# Patient Record
Sex: MALE | Race: WHITE | NOT HISPANIC OR LATINO | Employment: FULL TIME | URBAN - NONMETROPOLITAN AREA
[De-identification: names, ages, dates, MRNs, and addresses within clinical notes are randomized per-mention and may not be internally consistent; named-entity substitution may affect disease eponyms.]

---

## 2017-05-04 DIAGNOSIS — J30.1 ALLERGIC RHINITIS DUE TO POLLEN: ICD-10-CM

## 2017-05-06 RX ORDER — LOSARTAN POTASSIUM AND HYDROCHLOROTHIAZIDE 25; 100 MG/1; MG/1
TABLET ORAL
Qty: 30 TABLET | Refills: 4 | Status: SHIPPED | OUTPATIENT
Start: 2017-05-06 | End: 2018-02-16 | Stop reason: SDUPTHER

## 2017-05-06 RX ORDER — FLUTICASONE PROPIONATE 50 MCG
SPRAY, SUSPENSION (ML) NASAL
Qty: 2 EACH | Refills: 4 | Status: SHIPPED | OUTPATIENT
Start: 2017-05-06 | End: 2018-02-16 | Stop reason: SDUPTHER

## 2017-05-06 RX ORDER — POTASSIUM CHLORIDE 750 MG/1
TABLET, FILM COATED, EXTENDED RELEASE ORAL
Qty: 30 TABLET | Refills: 3 | Status: SHIPPED | OUTPATIENT
Start: 2017-05-06 | End: 2017-11-30 | Stop reason: SDUPTHER

## 2017-05-11 RX ORDER — ATORVASTATIN CALCIUM 20 MG/1
TABLET, FILM COATED ORAL
Qty: 30 TABLET | Refills: 3 | Status: SHIPPED | OUTPATIENT
Start: 2017-05-11 | End: 2018-02-16 | Stop reason: SDUPTHER

## 2017-12-01 RX ORDER — POTASSIUM CHLORIDE 750 MG/1
TABLET, FILM COATED, EXTENDED RELEASE ORAL
Qty: 30 TABLET | Refills: 3 | Status: SHIPPED | OUTPATIENT
Start: 2017-12-01 | End: 2018-02-16 | Stop reason: SDUPTHER

## 2018-02-16 DIAGNOSIS — J30.1 ALLERGIC RHINITIS DUE TO POLLEN: ICD-10-CM

## 2018-02-17 RX ORDER — LOSARTAN POTASSIUM AND HYDROCHLOROTHIAZIDE 25; 100 MG/1; MG/1
TABLET ORAL
Qty: 30 TABLET | Refills: 5 | Status: SHIPPED | OUTPATIENT
Start: 2018-02-17 | End: 2018-06-22 | Stop reason: SDUPTHER

## 2018-02-17 RX ORDER — POTASSIUM CHLORIDE 750 MG/1
TABLET, FILM COATED, EXTENDED RELEASE ORAL
Qty: 30 TABLET | Refills: 4 | Status: SHIPPED | OUTPATIENT
Start: 2018-02-17 | End: 2018-06-22 | Stop reason: SDUPTHER

## 2018-02-17 RX ORDER — FLUTICASONE PROPIONATE 50 MCG
SPRAY, SUSPENSION (ML) NASAL
Qty: 16 G | Refills: 4 | Status: SHIPPED | OUTPATIENT
Start: 2018-02-17 | End: 2018-09-06 | Stop reason: SDUPTHER

## 2018-02-17 RX ORDER — ATORVASTATIN CALCIUM 20 MG/1
TABLET, FILM COATED ORAL
Qty: 30 TABLET | Refills: 4 | Status: SHIPPED | OUTPATIENT
Start: 2018-02-17 | End: 2018-06-22 | Stop reason: SDUPTHER

## 2018-04-19 ENCOUNTER — OFFICE VISIT (OUTPATIENT)
Dept: FAMILY MEDICINE CLINIC | Facility: CLINIC | Age: 48
End: 2018-04-19

## 2018-04-19 VITALS
WEIGHT: 266 LBS | OXYGEN SATURATION: 98 % | HEIGHT: 73 IN | HEART RATE: 78 BPM | DIASTOLIC BLOOD PRESSURE: 80 MMHG | BODY MASS INDEX: 35.25 KG/M2 | TEMPERATURE: 98.1 F | SYSTOLIC BLOOD PRESSURE: 130 MMHG

## 2018-04-19 DIAGNOSIS — R73.9 HYPERGLYCEMIA: ICD-10-CM

## 2018-04-19 DIAGNOSIS — R73.03 PREDIABETES: ICD-10-CM

## 2018-04-19 DIAGNOSIS — E78.2 MIXED HYPERLIPIDEMIA: ICD-10-CM

## 2018-04-19 DIAGNOSIS — J40 BRONCHITIS: Primary | ICD-10-CM

## 2018-04-19 DIAGNOSIS — I10 ESSENTIAL HYPERTENSION: ICD-10-CM

## 2018-04-19 LAB
ALBUMIN SERPL-MCNC: 4.3 G/DL (ref 3.5–5)
ALBUMIN/GLOB SERPL: 1.4 G/DL (ref 1.5–2.5)
ALP SERPL-CCNC: 56 U/L (ref 40–129)
ALT SERPL W P-5'-P-CCNC: 25 U/L (ref 10–44)
ANION GAP SERPL CALCULATED.3IONS-SCNC: 7.8 MMOL/L (ref 3.6–11.2)
AST SERPL-CCNC: 20 U/L (ref 10–34)
BILIRUB SERPL-MCNC: 0.8 MG/DL (ref 0.2–1.8)
BUN BLD-MCNC: 18 MG/DL (ref 7–21)
BUN/CREAT SERPL: 18.6 (ref 7–25)
CALCIUM SPEC-SCNC: 9.7 MG/DL (ref 7.7–10)
CHLORIDE SERPL-SCNC: 102 MMOL/L (ref 99–112)
CHOLEST SERPL-MCNC: 138 MG/DL (ref 0–200)
CO2 SERPL-SCNC: 30.2 MMOL/L (ref 24.3–31.9)
CREAT BLD-MCNC: 0.97 MG/DL (ref 0.43–1.29)
GFR SERPL CREATININE-BSD FRML MDRD: 83 ML/MIN/1.73
GLOBULIN UR ELPH-MCNC: 3.1 GM/DL
GLUCOSE BLD-MCNC: 117 MG/DL (ref 70–110)
HBA1C MFR BLD: 5.9 % (ref 4.5–5.7)
HDLC SERPL-MCNC: 30 MG/DL (ref 60–100)
LDLC SERPL CALC-MCNC: 89 MG/DL (ref 0–100)
LDLC/HDLC SERPL: 2.97 {RATIO}
OSMOLALITY SERPL CALC.SUM OF ELEC: 282.3 MOSM/KG (ref 273–305)
POTASSIUM BLD-SCNC: 3.6 MMOL/L (ref 3.5–5.3)
PROT SERPL-MCNC: 7.4 G/DL (ref 6–8)
SODIUM BLD-SCNC: 140 MMOL/L (ref 135–153)
TESTOST SERPL-MCNC: 378.09 NG/DL (ref 123.06–813.86)
TRIGL SERPL-MCNC: 95 MG/DL (ref 0–150)
TSH SERPL DL<=0.05 MIU/L-ACNC: 1.57 MIU/ML (ref 0.55–4.78)
VLDLC SERPL-MCNC: 19 MG/DL

## 2018-04-19 PROCEDURE — 80053 COMPREHEN METABOLIC PANEL: CPT | Performed by: PHYSICIAN ASSISTANT

## 2018-04-19 PROCEDURE — 99214 OFFICE O/P EST MOD 30 MIN: CPT | Performed by: PHYSICIAN ASSISTANT

## 2018-04-19 PROCEDURE — 80061 LIPID PANEL: CPT | Performed by: PHYSICIAN ASSISTANT

## 2018-04-19 PROCEDURE — 36415 COLL VENOUS BLD VENIPUNCTURE: CPT | Performed by: PHYSICIAN ASSISTANT

## 2018-04-19 PROCEDURE — 84403 ASSAY OF TOTAL TESTOSTERONE: CPT | Performed by: PHYSICIAN ASSISTANT

## 2018-04-19 PROCEDURE — 84402 ASSAY OF FREE TESTOSTERONE: CPT | Performed by: PHYSICIAN ASSISTANT

## 2018-04-19 PROCEDURE — 84443 ASSAY THYROID STIM HORMONE: CPT | Performed by: PHYSICIAN ASSISTANT

## 2018-04-19 PROCEDURE — 83036 HEMOGLOBIN GLYCOSYLATED A1C: CPT | Performed by: PHYSICIAN ASSISTANT

## 2018-04-19 RX ORDER — LEVOFLOXACIN 500 MG/1
500 TABLET, FILM COATED ORAL DAILY
Qty: 10 TABLET | Refills: 0 | Status: SHIPPED | OUTPATIENT
Start: 2018-04-19 | End: 2018-04-29

## 2018-04-19 NOTE — PROGRESS NOTES
Subjective   Anne Nunez is a 47 y.o. male.     Chief complaint-chest congestion and cough    History of Present Illness     Chest congestion-  He complains of chest congestion, productive cough of thick green sputum, and sore throat for the past 4 days.  Some relief with Flonase and Zyrtec.    Dyslipidemia  Compliance with treatment has been good. The patient exercises frequently. He is currently being prescribed the following medication for his dyslipidemia - lifestyle modifcation, atorvastatin, omega 3 fatty acids. Patient denies side effects associated with his medications. Most recent lipids include  Lab Results   Component Value Date    TRIG 105 10/07/2014    HDL 33 (L) 10/07/2014     (H) 10/07/2014   Not at goal    Hypertension-well controlled with losartan /25 mg daily    Prediabetes-stable with diet.  most recent hemoglobin A1c 5.4  Results for ANNE NUNEZ (MRN [id number]) as of 4/19/2018 10:52   Ref. Range 10/7/2014 15:01   Hemoglobin A1C Latest Ref Range: 4.5 - 5.7 % 5.4       The following portions of the patient's history were reviewed and updated as appropriate: allergies, current medications, past family history, past medical history, past social history, past surgical history and problem list.    Review of Systems   Constitutional: Negative for activity change, appetite change and fever.   HENT: Positive for congestion and sore throat. Negative for ear pain and sinus pressure.    Eyes: Negative for pain and visual disturbance.   Respiratory: Positive for cough. Negative for chest tightness.    Cardiovascular: Negative for chest pain and palpitations.   Gastrointestinal: Negative for abdominal pain, constipation, diarrhea, nausea and vomiting.   Endocrine: Negative for polydipsia and polyuria.   Genitourinary: Negative for dysuria and frequency.   Musculoskeletal: Negative for back pain and myalgias.   Skin: Negative for color change and rash.   Allergic/Immunologic: Negative for food  "allergies and immunocompromised state.   Neurological: Negative for dizziness, syncope and headaches.   Hematological: Negative for adenopathy. Does not bruise/bleed easily.   Psychiatric/Behavioral: Negative for hallucinations and suicidal ideas. The patient is not nervous/anxious.        /80   Pulse 78   Temp 98.1 °F (36.7 °C) (Temporal Artery )   Ht 185.4 cm (73\")   Wt 121 kg (266 lb)   SpO2 98%   BMI 35.09 kg/m²     Physical Exam   Constitutional: He is oriented to person, place, and time. He appears well-developed and well-nourished.   HENT:   Head: Normocephalic and atraumatic.   Nose: Nose normal.   Oropharynx erythematous.   Eyes: Conjunctivae and EOM are normal. Pupils are equal, round, and reactive to light.   Neck: Normal range of motion. Neck supple. No tracheal deviation present. No thyromegaly present.   Cardiovascular: Normal rate, regular rhythm, normal heart sounds and intact distal pulses.    No murmur heard.  Pulmonary/Chest: Effort normal. No respiratory distress. He has no wheezes.   Bronchovesicular breath sounds noted bilaterally   Abdominal: Soft. Bowel sounds are normal. There is no tenderness. There is no guarding.   Musculoskeletal: Normal range of motion. He exhibits no edema or tenderness.   Lymphadenopathy:     He has no cervical adenopathy.   Neurological: He is alert and oriented to person, place, and time.   Skin: Skin is warm and dry. No rash noted.   Psychiatric: He has a normal mood and affect. His behavior is normal.   Nursing note and vitals reviewed.      Assessment/Plan     Diagnoses and all orders for this visit:    Bronchitis  -     levoFLOXacin (LEVAQUIN) 500 MG tablet; Take 1 tablet by mouth Daily for 10 days.    Mixed hyperlipidemia  Comments:  He was advised follow-up cholesterol diet  Orders:  -     Comprehensive Metabolic Panel  -     Lipid Panel    Essential hypertension  -     TSH  -     Testosterone  -     Testosterone Free " Direct    Prediabetes    Hyperglycemia  -     Hemoglobin A1c                 This document has been electronically signed by:  Arianna Kim PA-C

## 2018-04-22 LAB — TESTOST FREE SERPL-MCNC: 7.1 PG/ML (ref 6.8–21.5)

## 2018-05-11 RX ORDER — FLUOXETINE HYDROCHLORIDE 20 MG/1
CAPSULE ORAL
Qty: 30 CAPSULE | Refills: 4 | Status: SHIPPED | OUTPATIENT
Start: 2018-05-11 | End: 2018-06-22 | Stop reason: SDUPTHER

## 2018-06-18 ENCOUNTER — OFFICE VISIT (OUTPATIENT)
Dept: FAMILY MEDICINE CLINIC | Facility: CLINIC | Age: 48
End: 2018-06-18

## 2018-06-18 VITALS
HEIGHT: 73 IN | RESPIRATION RATE: 12 BRPM | SYSTOLIC BLOOD PRESSURE: 125 MMHG | OXYGEN SATURATION: 96 % | HEART RATE: 81 BPM | TEMPERATURE: 99.2 F | WEIGHT: 268 LBS | BODY MASS INDEX: 35.52 KG/M2 | DIASTOLIC BLOOD PRESSURE: 75 MMHG
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
## 2018-06-18 PROCEDURE — 99213 OFFICE O/P EST LOW 20 MIN: CPT | Performed by: GENERAL PRACTICE

## 2018-06-19 NOTE — PROGRESS NOTES
Subjective   Kwadwo Franks is a 48 y.o. male.     History of Present Illness     Skin Lesion  Presents with a four day history of an itchy red spot over his left lower back.  He first noticed this when he came in from mowing the grass.  He denies any spots elsewhere nor any other associated symptoms.  He has otherwise felt well and has had no fever or chills.  His wife applied hydrocortisone 1% cream yesterday and gave him 10 mg of cetirizine and he feels these helped some    The following portions of the patient's history were reviewed and updated as appropriate: allergies, current medications and problem list.    Review of Systems   Constitutional: Negative for appetite change, chills and fever.   HENT: Negative for congestion, rhinorrhea and sore throat.    Eyes: Negative for visual disturbance.   Respiratory: Negative for cough and shortness of breath.    Cardiovascular: Negative for chest pain.   Gastrointestinal: Negative for abdominal pain, constipation, diarrhea, nausea and vomiting.   Musculoskeletal: Negative for arthralgias, back pain and myalgias.   Skin: Positive for color change.   Neurological: Negative for headaches.     Objective   Physical Exam   Constitutional: He is oriented to person, place, and time. He appears well-developed and well-nourished. He is cooperative. He does not have a sickly appearance. No distress.   Bright and in good spirits. No apparent distress. No pallor, jaundice, diaphoresis, or cyanosis.   HENT:   Head: Atraumatic.   Right Ear: Tympanic membrane, external ear and ear canal normal.   Left Ear: Tympanic membrane, external ear and ear canal normal.   Mouth/Throat: Oropharynx is clear and moist.   Eyes: EOM are normal. Pupils are equal, round, and reactive to light. No scleral icterus.   Neck: No JVD present. Carotid bruit is not present. No tracheal deviation present. No thyromegaly present.   Cardiovascular: Normal rate, regular rhythm, S1 normal, S2 normal, normal heart  sounds and intact distal pulses.  Exam reveals no gallop.    No murmur heard.  Pulmonary/Chest: Breath sounds normal. He has no wheezes. He has no rales.   Musculoskeletal: He exhibits no tenderness or deformity.   Lymphadenopathy:        Head (right side): No submandibular adenopathy present.        Head (left side): No submandibular adenopathy present.     He has no cervical adenopathy.   Neurological: He is alert and oriented to person, place, and time. No cranial nerve deficit. Coordination normal.   Skin: Skin is warm and dry. Lesion (10 cm well demarcated narrow oval blanchable uniformly erythematous patch left lower back - slightly warm - no tenderness) noted. No rash noted. He is not diaphoretic. No pallor. Nails show no clubbing.   Psychiatric: He has a normal mood and affect. His behavior is normal.     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

## 2018-06-21 ENCOUNTER — OFFICE VISIT (OUTPATIENT)
Dept: FAMILY MEDICINE CLINIC | Facility: CLINIC | Age: 48
End: 2018-06-21

## 2018-06-21 VITALS
SYSTOLIC BLOOD PRESSURE: 130 MMHG | OXYGEN SATURATION: 98 % | HEIGHT: 73 IN | HEART RATE: 79 BPM | BODY MASS INDEX: 35.25 KG/M2 | WEIGHT: 266 LBS | DIASTOLIC BLOOD PRESSURE: 76 MMHG | TEMPERATURE: 97.7 F

## 2018-06-21 DIAGNOSIS — R73.03 PREDIABETES: ICD-10-CM

## 2018-06-21 DIAGNOSIS — I10 ESSENTIAL HYPERTENSION: Primary | ICD-10-CM

## 2018-06-21 DIAGNOSIS — E78.2 MIXED HYPERLIPIDEMIA: ICD-10-CM

## 2018-06-21 PROCEDURE — 99214 OFFICE O/P EST MOD 30 MIN: CPT | Performed by: PHYSICIAN ASSISTANT

## 2018-06-22 RX ORDER — LOSARTAN POTASSIUM AND HYDROCHLOROTHIAZIDE 25; 100 MG/1; MG/1
1 TABLET ORAL EVERY MORNING
Qty: 30 TABLET | Refills: 5 | Status: SHIPPED | OUTPATIENT
Start: 2018-06-22 | End: 2018-10-08

## 2018-06-22 RX ORDER — FLUOXETINE HYDROCHLORIDE 20 MG/1
20 CAPSULE ORAL DAILY
Qty: 30 CAPSULE | Refills: 5 | Status: SHIPPED | OUTPATIENT
Start: 2018-06-22 | End: 2018-10-08

## 2018-06-22 RX ORDER — POTASSIUM CHLORIDE 750 MG/1
10 TABLET, FILM COATED, EXTENDED RELEASE ORAL DAILY
Qty: 30 TABLET | Refills: 5 | Status: SHIPPED | OUTPATIENT
Start: 2018-06-22 | End: 2018-12-21 | Stop reason: SDUPTHER

## 2018-06-22 RX ORDER — ATORVASTATIN CALCIUM 20 MG/1
20 TABLET, FILM COATED ORAL DAILY
Qty: 30 TABLET | Refills: 5 | Status: SHIPPED | OUTPATIENT
Start: 2018-06-22 | End: 2018-08-20

## 2018-06-22 NOTE — PROGRESS NOTES
"Subjective   Kwadwo Franks is a 48 y.o. male.     Chief complaint-hypertension    History of Present Illness     Hypertension-  He is here today to follow-up on chronic issues including hypertension that is well controlled with losartan HCT.    Hyperlipidemia-stable with Lipitor    Prediabetes-currently controlled with diet.  Last hemoglobin A1c was 5.9.    The following portions of the patient's history were reviewed and updated as appropriate: allergies, current medications, past family history, past medical history, past social history, past surgical history and problem list.    Review of Systems   Constitutional: Negative for activity change, appetite change and fever.   HENT: Negative for ear pain, sinus pressure and sore throat.    Eyes: Negative for pain and visual disturbance.   Respiratory: Negative for cough and chest tightness.    Cardiovascular: Negative for chest pain and palpitations.   Gastrointestinal: Negative for abdominal pain, constipation, diarrhea, nausea and vomiting.   Endocrine: Negative for polydipsia and polyuria.   Genitourinary: Negative for dysuria and frequency.   Musculoskeletal: Negative for back pain and myalgias.   Skin: Negative for color change and rash.   Allergic/Immunologic: Negative for food allergies and immunocompromised state.   Neurological: Negative for dizziness, syncope and headaches.   Hematological: Negative for adenopathy. Does not bruise/bleed easily.   Psychiatric/Behavioral: Negative for hallucinations and suicidal ideas. The patient is not nervous/anxious.        /76   Pulse 79   Temp 97.7 °F (36.5 °C) (Oral)   Ht 185.4 cm (72.99\")   Wt 121 kg (266 lb)   SpO2 98%   BMI 35.10 kg/m²   Office Visit on 04/19/2018   Component Date Value Ref Range Status   • Glucose 04/19/2018 117* 70 - 110 mg/dL Final   • BUN 04/19/2018 18  7 - 21 mg/dL Final   • Creatinine 04/19/2018 0.97  0.43 - 1.29 mg/dL Final   • Sodium 04/19/2018 140  135 - 153 mmol/L Final   • " Potassium 04/19/2018 3.6  3.5 - 5.3 mmol/L Final   • Chloride 04/19/2018 102  99 - 112 mmol/L Final   • CO2 04/19/2018 30.2  24.3 - 31.9 mmol/L Final   • Calcium 04/19/2018 9.7  7.7 - 10.0 mg/dL Final   • Total Protein 04/19/2018 7.4  6.0 - 8.0 g/dL Final   • Albumin 04/19/2018 4.30  3.50 - 5.00 g/dL Final   • ALT (SGPT) 04/19/2018 25  10 - 44 U/L Final   • AST (SGOT) 04/19/2018 20  10 - 34 U/L Final   • Alkaline Phosphatase 04/19/2018 56  40 - 129 U/L Final   • Total Bilirubin 04/19/2018 0.8  0.2 - 1.8 mg/dL Final   • eGFR Non African Amer 04/19/2018 83  >60 mL/min/1.73 Final   • Globulin 04/19/2018 3.1  gm/dL Final   • A/G Ratio 04/19/2018 1.4* 1.5 - 2.5 g/dL Final   • BUN/Creatinine Ratio 04/19/2018 18.6  7.0 - 25.0 Final   • Anion Gap 04/19/2018 7.8  3.6 - 11.2 mmol/L Final   • Total Cholesterol 04/19/2018 138  0 - 200 mg/dL Final   • Triglycerides 04/19/2018 95  0 - 150 mg/dL Final   • HDL Cholesterol 04/19/2018 30* 60 - 100 mg/dL Final   • LDL Cholesterol  04/19/2018 89  0 - 100 mg/dL Final   • VLDL Cholesterol 04/19/2018 19  mg/dL Final   • LDL/HDL Ratio 04/19/2018 2.97   Final   • Hemoglobin A1C 04/19/2018 5.90* 4.50 - 5.70 % Final   • TSH 04/19/2018 1.570  0.550 - 4.780 mIU/mL Final   • Testosterone, Total 04/19/2018 378.09  123.06 - 813.86 ng/dL Final   • Testosterone, Free 04/19/2018 7.1  6.8 - 21.5 pg/mL Final   • Osmolality Calc 04/19/2018 282.3  273.0 - 305.0 mOsm/kg Final       Physical Exam   Constitutional: He is oriented to person, place, and time. He appears well-developed and well-nourished.   HENT:   Head: Normocephalic and atraumatic.   Nose: Nose normal.   Mouth/Throat: Oropharynx is clear and moist.   Eyes: Conjunctivae and EOM are normal. Pupils are equal, round, and reactive to light.   Neck: Normal range of motion. Neck supple. No tracheal deviation present. No thyromegaly present.   Cardiovascular: Normal rate, regular rhythm, normal heart sounds and intact distal pulses.    No murmur  heard.  Pulmonary/Chest: Effort normal and breath sounds normal. No respiratory distress. He has no wheezes.   Abdominal: Soft. Bowel sounds are normal. There is no tenderness. There is no guarding.   Musculoskeletal: Normal range of motion. He exhibits no edema or tenderness.   Lymphadenopathy:     He has no cervical adenopathy.   Neurological: He is alert and oriented to person, place, and time.   Skin: Skin is warm and dry. No rash noted.   Psychiatric: He has a normal mood and affect. His behavior is normal.   Nursing note and vitals reviewed.      Assessment/Plan     Diagnoses and all orders for this visit:    Essential hypertension  -     losartan-hydrochlorothiazide (HYZAAR) 100-25 MG per tablet; Take 1 tablet by mouth Every Morning.    Mixed hyperlipidemia  -     atorvastatin (LIPITOR) 20 MG tablet; Take 1 tablet by mouth Daily.    Prediabetes    Other orders  -     FLUoxetine (PROzac) 20 MG capsule; Take 1 capsule by mouth Daily.  -     potassium chloride (K-DUR) 10 MEQ CR tablet; Take 1 tablet by mouth Daily.                 This document has been electronically signed by:  Arianna Kim PA-C

## 2018-08-20 ENCOUNTER — OFFICE VISIT (OUTPATIENT)
Dept: FAMILY MEDICINE CLINIC | Facility: CLINIC | Age: 48
End: 2018-08-20

## 2018-08-20 VITALS
HEIGHT: 73 IN | WEIGHT: 264 LBS | BODY MASS INDEX: 34.99 KG/M2 | SYSTOLIC BLOOD PRESSURE: 130 MMHG | HEART RATE: 111 BPM | OXYGEN SATURATION: 95 % | DIASTOLIC BLOOD PRESSURE: 80 MMHG | TEMPERATURE: 98.9 F

## 2018-08-20 DIAGNOSIS — S03.00XA DISLOCATION OF TEMPOROMANDIBULAR JOINT, INITIAL ENCOUNTER: Primary | ICD-10-CM

## 2018-08-20 DIAGNOSIS — R00.0 TACHYCARDIA: ICD-10-CM

## 2018-08-20 PROCEDURE — 99213 OFFICE O/P EST LOW 20 MIN: CPT | Performed by: PHYSICIAN ASSISTANT

## 2018-08-20 PROCEDURE — 93000 ELECTROCARDIOGRAM COMPLETE: CPT | Performed by: PHYSICIAN ASSISTANT

## 2018-08-20 NOTE — PROGRESS NOTES
"Subjective   Kwadwo Franks is a 48 y.o. male.     Chief complaint-jaw tingling    History of Present Illness     Jaw tingling-  He complains of bilateral lower jaw tingling and numbness that extends down to bilateral upper clavicular area.  He reports a feeling of mild to moderate pain stating \"it feels like I'm wearing a TENS unit.\"  He states symptoms radiated to posterior neck earlier today but those have resolved.  Onset 2 hours ago.  He states he has had nausea for about 3 days.  He denies any weakness, decreased strength, dysphagia, or dysphasia.    The following portions of the patient's history were reviewed and updated as appropriate: allergies, current medications, past family history, past medical history, past social history, past surgical history and problem list.    Review of Systems   Constitutional: Negative for activity change, appetite change and fever.   HENT: Negative for ear pain, sinus pressure and sore throat.    Eyes: Negative for pain and visual disturbance.   Respiratory: Negative for cough and chest tightness.    Cardiovascular: Negative for chest pain and palpitations.   Gastrointestinal: Negative for abdominal pain, constipation, diarrhea, nausea and vomiting.   Endocrine: Negative for polydipsia and polyuria.   Genitourinary: Negative for dysuria and frequency.   Musculoskeletal: Negative for back pain and myalgias.   Skin: Negative for color change and rash.   Allergic/Immunologic: Negative for food allergies and immunocompromised state.   Neurological: Positive for numbness. Negative for dizziness, syncope and headaches.   Hematological: Negative for adenopathy. Does not bruise/bleed easily.   Psychiatric/Behavioral: Negative for hallucinations and suicidal ideas. The patient is not nervous/anxious.        /80   Pulse 111   Temp 98.9 °F (37.2 °C)   Ht 185.4 cm (72.99\")   Wt 120 kg (264 lb)   SpO2 95%   BMI 34.84 kg/m²     Physical Exam   Constitutional: He is oriented to " person, place, and time. He appears well-developed and well-nourished.   HENT:   Head: Normocephalic and atraumatic.   Nose: Nose normal.   Mouth/Throat: Oropharynx is clear and moist.   Eyes: Pupils are equal, round, and reactive to light. Conjunctivae and EOM are normal.   Neck: Normal range of motion. Neck supple. No tracheal deviation present. No thyromegaly present.   Cardiovascular: Normal rate, regular rhythm, normal heart sounds and intact distal pulses.    No murmur heard.  Pulmonary/Chest: Effort normal and breath sounds normal. No respiratory distress. He has no wheezes.   Abdominal: Soft. Bowel sounds are normal. There is no tenderness. There is no guarding.   Musculoskeletal: Normal range of motion. He exhibits no edema or tenderness.   Lymphadenopathy:     He has no cervical adenopathy.   Neurological: He is alert and oriented to person, place, and time. No cranial nerve deficit or sensory deficit. He exhibits normal muscle tone. Coordination normal.   Skin: Skin is warm and dry. No rash noted.   Psychiatric: He has a normal mood and affect. His behavior is normal.   Nursing note and vitals reviewed.      Assessment/Plan     Diagnoses and all orders for this visit:    Dislocation of temporomandibular joint, initial encounter    Tachycardia  Comments:  Tachycardia likely due to anxiety about acute issue  Orders:  -     ECG 12 Lead    He was advised on conservative treatment including avoiding foods that require a lot of mastication including steak or chewing bubblegum.  He was advised to get a bite guard to wear at night.  He was advised that if symptoms should persist or worsen that he should come back for immediate evaluation and treatment regarding the nearest emergency room.    8/20/2018 12:34 PM  EKG  Performed by: Lisa Fournier CMA  Interpreted by: Arianna Kim PA-C  Rhythm: Sinus rhythm  Rate: Normal at 91 bpm  QRS axis: Normal  ST segments: Normal  T waves: Normal  Conduction: Normal  Clinical  interpretation: Normal EKG             This document has been electronically signed by:  Arianna Kim PA-C

## 2018-09-06 DIAGNOSIS — J30.1 ALLERGIC RHINITIS DUE TO POLLEN: ICD-10-CM

## 2018-09-10 RX ORDER — FLUTICASONE PROPIONATE 50 MCG
SPRAY, SUSPENSION (ML) NASAL
Qty: 16 G | Refills: 4 | Status: SHIPPED | OUTPATIENT
Start: 2018-09-10 | End: 2018-12-21 | Stop reason: SDUPTHER

## 2018-09-10 RX ORDER — POTASSIUM CHLORIDE 750 MG/1
TABLET, FILM COATED, EXTENDED RELEASE ORAL
Qty: 30 TABLET | Refills: 4 | Status: SHIPPED | OUTPATIENT
Start: 2018-09-10 | End: 2018-10-08 | Stop reason: SDUPTHER

## 2018-10-08 ENCOUNTER — OFFICE VISIT (OUTPATIENT)
Dept: FAMILY MEDICINE CLINIC | Facility: CLINIC | Age: 48
End: 2018-10-08

## 2018-10-08 VITALS
WEIGHT: 250 LBS | SYSTOLIC BLOOD PRESSURE: 122 MMHG | DIASTOLIC BLOOD PRESSURE: 80 MMHG | TEMPERATURE: 98.4 F | BODY MASS INDEX: 33.13 KG/M2 | OXYGEN SATURATION: 97 % | HEART RATE: 94 BPM | HEIGHT: 73 IN

## 2018-10-08 DIAGNOSIS — F41.1 GAD (GENERALIZED ANXIETY DISORDER): Primary | ICD-10-CM

## 2018-10-08 DIAGNOSIS — E78.2 MIXED HYPERLIPIDEMIA: ICD-10-CM

## 2018-10-08 DIAGNOSIS — H61.21 IMPACTED CERUMEN OF RIGHT EAR: ICD-10-CM

## 2018-10-08 DIAGNOSIS — Z00.00 HEALTHCARE MAINTENANCE: ICD-10-CM

## 2018-10-08 DIAGNOSIS — I10 ESSENTIAL HYPERTENSION: ICD-10-CM

## 2018-10-08 PROCEDURE — 90471 IMMUNIZATION ADMIN: CPT | Performed by: PHYSICIAN ASSISTANT

## 2018-10-08 PROCEDURE — 90686 IIV4 VACC NO PRSV 0.5 ML IM: CPT | Performed by: PHYSICIAN ASSISTANT

## 2018-10-08 PROCEDURE — 99214 OFFICE O/P EST MOD 30 MIN: CPT | Performed by: PHYSICIAN ASSISTANT

## 2018-10-08 PROCEDURE — 69210 REMOVE IMPACTED EAR WAX UNI: CPT | Performed by: PHYSICIAN ASSISTANT

## 2018-10-08 RX ORDER — FLUOXETINE HYDROCHLORIDE 40 MG/1
40 CAPSULE ORAL DAILY
Qty: 30 CAPSULE | Refills: 5 | Status: SHIPPED | OUTPATIENT
Start: 2018-10-08 | End: 2018-12-21 | Stop reason: SDUPTHER

## 2018-10-08 RX ORDER — LOSARTAN POTASSIUM AND HYDROCHLOROTHIAZIDE 25; 100 MG/1; MG/1
TABLET ORAL
Qty: 30 TABLET | Refills: 5 | Status: SHIPPED | OUTPATIENT
Start: 2018-10-08 | End: 2018-12-21 | Stop reason: SDUPTHER

## 2018-10-09 NOTE — PROGRESS NOTES
Subjective   Kwadwo Franks is a 48 y.o. male.     Chief complaint-facial tingling    History of Present Illness     Facial tingling-  He complains of sudden onset of tingling that began at bilateral posterior neck and then radiated to bilateral jaws right side of face to the right mouth nose and I.  He reports associated numbness with a tight feeling on the right side of the face and lateral left eye area.  He states the symptoms began approximately 2-1/2 hours ago and have lessened in severity since that time.  He has been under a lot of stress lately working full-time and running for a public office.  He denies any strokelike symptoms including dysphagia, dysphagia, or unilateral weakness.     Generalized anxiety disorder-not at goal with Prozac 20 mg daily.  He states that he often times has trouble going to sleep because his mind is racing thinking about things to BE done in the future and thinks that he has done that day.    Hypertension-well controlled with losartan/HCTZ    Hyperlipidemia-currently stable with diet    The following portions of the patient's history were reviewed and updated as appropriate: allergies, current medications, past family history, past medical history, past social history, past surgical history and problem list.    Review of Systems   Constitutional: Negative for activity change, appetite change and fever.   HENT: Positive for congestion and ear pain (fullness). Negative for sinus pressure and sore throat.    Eyes: Negative for pain and visual disturbance.   Respiratory: Negative for cough and chest tightness.    Cardiovascular: Negative for chest pain and palpitations.   Gastrointestinal: Negative for abdominal pain, constipation, diarrhea, nausea and vomiting.   Endocrine: Negative for polydipsia and polyuria.   Genitourinary: Negative for dysuria and frequency.   Musculoskeletal: Negative for back pain and myalgias.   Skin: Negative for color change and rash.  "  Allergic/Immunologic: Negative for food allergies and immunocompromised state.   Neurological: Positive for numbness. Negative for dizziness, syncope, weakness and headaches.   Hematological: Negative for adenopathy. Does not bruise/bleed easily.   Psychiatric/Behavioral: Positive for sleep disturbance. Negative for dysphoric mood, hallucinations and suicidal ideas. The patient is nervous/anxious.        /80   Pulse 94   Temp 98.4 °F (36.9 °C) (Oral)   Ht 185.4 cm (72.99\")   Wt 113 kg (250 lb)   SpO2 97%   BMI 32.99 kg/m²   Office Visit on 04/19/2018   Component Date Value Ref Range Status   • Glucose 04/19/2018 117* 70 - 110 mg/dL Final   • BUN 04/19/2018 18  7 - 21 mg/dL Final   • Creatinine 04/19/2018 0.97  0.43 - 1.29 mg/dL Final   • Sodium 04/19/2018 140  135 - 153 mmol/L Final   • Potassium 04/19/2018 3.6  3.5 - 5.3 mmol/L Final   • Chloride 04/19/2018 102  99 - 112 mmol/L Final   • CO2 04/19/2018 30.2  24.3 - 31.9 mmol/L Final   • Calcium 04/19/2018 9.7  7.7 - 10.0 mg/dL Final   • Total Protein 04/19/2018 7.4  6.0 - 8.0 g/dL Final   • Albumin 04/19/2018 4.30  3.50 - 5.00 g/dL Final   • ALT (SGPT) 04/19/2018 25  10 - 44 U/L Final   • AST (SGOT) 04/19/2018 20  10 - 34 U/L Final   • Alkaline Phosphatase 04/19/2018 56  40 - 129 U/L Final   • Total Bilirubin 04/19/2018 0.8  0.2 - 1.8 mg/dL Final   • eGFR Non African Amer 04/19/2018 83  >60 mL/min/1.73 Final   • Globulin 04/19/2018 3.1  gm/dL Final   • A/G Ratio 04/19/2018 1.4* 1.5 - 2.5 g/dL Final   • BUN/Creatinine Ratio 04/19/2018 18.6  7.0 - 25.0 Final   • Anion Gap 04/19/2018 7.8  3.6 - 11.2 mmol/L Final   • Total Cholesterol 04/19/2018 138  0 - 200 mg/dL Final   • Triglycerides 04/19/2018 95  0 - 150 mg/dL Final   • HDL Cholesterol 04/19/2018 30* 60 - 100 mg/dL Final   • LDL Cholesterol  04/19/2018 89  0 - 100 mg/dL Final   • VLDL Cholesterol 04/19/2018 19  mg/dL Final   • LDL/HDL Ratio 04/19/2018 2.97   Final   • Hemoglobin A1C 04/19/2018 5.90* " 4.50 - 5.70 % Final   • TSH 04/19/2018 1.570  0.550 - 4.780 mIU/mL Final   • Testosterone, Total 04/19/2018 378.09  123.06 - 813.86 ng/dL Final   • Testosterone, Free 04/19/2018 7.1  6.8 - 21.5 pg/mL Final   • Osmolality Calc 04/19/2018 282.3  273.0 - 305.0 mOsm/kg Final       Physical Exam   Constitutional: He is oriented to person, place, and time. He appears well-developed and well-nourished.   HENT:   Head: Normocephalic and atraumatic.   Nose: Nose normal.   Mouth/Throat: Oropharynx is clear and moist.   Cerumen impaction noted right ear canal   Eyes: Pupils are equal, round, and reactive to light. Conjunctivae and EOM are normal.   Neck: Normal range of motion. Neck supple. No tracheal deviation present. No thyromegaly present.   Cardiovascular: Normal rate, regular rhythm, normal heart sounds and intact distal pulses.    No murmur heard.  Pulmonary/Chest: Effort normal and breath sounds normal. No respiratory distress. He has no wheezes.   Abdominal: Soft. Bowel sounds are normal. There is no tenderness. There is no guarding.   Musculoskeletal: Normal range of motion. He exhibits no edema or tenderness.   Lymphadenopathy:     He has no cervical adenopathy.   Neurological: He is alert and oriented to person, place, and time. No cranial nerve deficit or sensory deficit. He exhibits normal muscle tone. Coordination normal.   Skin: Skin is warm and dry. No rash noted.   Psychiatric: He has a normal mood and affect. His behavior is normal.   Nursing note and vitals reviewed.      Assessment/Plan     Diagnoses and all orders for this visit:    SCOTT (generalized anxiety disorder)  -     FLUoxetine (PROzac) 40 MG capsule; Take 1 capsule by mouth Daily.    Healthcare maintenance  -     Discontinue: hepatitis A (HAVRIX) vaccine 1,440 Units; Inject 1 mL into the appropriate muscle as directed by prescriber 1 (One) Time.  -     Hepatitis A Vaccine Adult (HAVRIX) - Today  -     Hepatitis A Vaccine Adult  (HAVRIX) - Dose 2;  Future    Impacted cerumen of right ear    Essential hypertension    Mixed hyperlipidemia    Other orders  -     Fluarix/Fluzone/Afluria Quad>6 Months    tingling likely psychosomatic in origin.  Plan to increase prozac and reevaluate in 4 weeks.  He was advised that if symptoms persist or worsen he should come back sooner.    Right ear cerumen removed by Arianna Kim with flex loop curette.  Patient tolerated the procedure well.  Numbness A and tingling likely psychosomatic complaints.  Increase Prozac 40 mg daily  I will follow-up with him in 6 weeks' time or sooner if needed.           This document has been electronically signed by:  Arianna Kim PA-C

## 2018-10-12 RX ORDER — MIRTAZAPINE 15 MG/1
15 TABLET, FILM COATED ORAL NIGHTLY PRN
Qty: 30 TABLET | Refills: 2 | Status: SHIPPED | OUTPATIENT
Start: 2018-10-12 | End: 2018-12-21 | Stop reason: SDUPTHER

## 2018-12-21 ENCOUNTER — OFFICE VISIT (OUTPATIENT)
Dept: FAMILY MEDICINE CLINIC | Facility: CLINIC | Age: 48
End: 2018-12-21

## 2018-12-21 VITALS
TEMPERATURE: 98.7 F | SYSTOLIC BLOOD PRESSURE: 140 MMHG | HEIGHT: 73 IN | HEART RATE: 96 BPM | OXYGEN SATURATION: 96 % | BODY MASS INDEX: 34.48 KG/M2 | DIASTOLIC BLOOD PRESSURE: 80 MMHG | WEIGHT: 260.2 LBS

## 2018-12-21 DIAGNOSIS — E78.2 MIXED HYPERLIPIDEMIA: ICD-10-CM

## 2018-12-21 DIAGNOSIS — F41.9 ANXIETY: ICD-10-CM

## 2018-12-21 DIAGNOSIS — F41.1 GAD (GENERALIZED ANXIETY DISORDER): Primary | ICD-10-CM

## 2018-12-21 DIAGNOSIS — J30.1 ALLERGIC RHINITIS DUE TO POLLEN: ICD-10-CM

## 2018-12-21 DIAGNOSIS — I10 ESSENTIAL HYPERTENSION: ICD-10-CM

## 2018-12-21 DIAGNOSIS — J30.1 ALLERGIC RHINITIS DUE TO POLLEN, UNSPECIFIED SEASONALITY: ICD-10-CM
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
## 2018-12-21 PROCEDURE — 99214 OFFICE O/P EST MOD 30 MIN: CPT | Performed by: PHYSICIAN ASSISTANT

## 2018-12-21 RX ORDER — LOSARTAN POTASSIUM AND HYDROCHLOROTHIAZIDE 25; 100 MG/1; MG/1
1 TABLET ORAL EVERY MORNING
Qty: 30 TABLET | Refills: 5 | Status: SHIPPED | OUTPATIENT
Start: 2018-12-21 | End: 2019-05-09

## 2018-12-21 RX ORDER — MIRTAZAPINE 15 MG/1
15 TABLET, FILM COATED ORAL NIGHTLY PRN
Qty: 30 TABLET | Refills: 5 | Status: SHIPPED | OUTPATIENT
Start: 2018-12-21 | End: 2019-02-11

## 2018-12-21 RX ORDER — FLUTICASONE PROPIONATE 50 MCG
2 SPRAY, SUSPENSION (ML) NASAL DAILY
Qty: 16 G | Refills: 5 | Status: SHIPPED | OUTPATIENT
Start: 2018-12-21 | End: 2019-08-02 | Stop reason: SDUPTHER

## 2018-12-21 RX ORDER — FLUOXETINE HYDROCHLORIDE 40 MG/1
40 CAPSULE ORAL DAILY
Qty: 30 CAPSULE | Refills: 5 | Status: SHIPPED | OUTPATIENT
Start: 2018-12-21 | End: 2019-03-04

## 2018-12-21 RX ORDER — POTASSIUM CHLORIDE 750 MG/1
10 TABLET, FILM COATED, EXTENDED RELEASE ORAL DAILY
Qty: 30 TABLET | Refills: 5 | Status: SHIPPED | OUTPATIENT
Start: 2018-12-21 | End: 2019-05-14

## 2018-12-28 NOTE — PROGRESS NOTES
"Subjective   Kwadwo Franks is a 48 y.o. male.       Chief Complaint -  anxiety    History of Present Illness -      Anxiety-  Significantly improved with no more psychosomatic symptoms since increasing prozac.    Allergic rhinitis- stable with flonase    Hyperlipidemia- stable with diet    Hypertension- stable with HCTZ    The following portions of the patient's history were reviewed and updated as appropriate: allergies, current medications, past family history, past medical history, past social history, past surgical history and problem list.    Review of Systems   Constitutional: Negative for activity change, appetite change and fever.   HENT: Negative for ear pain, sinus pressure and sore throat.    Eyes: Negative for pain and visual disturbance.   Respiratory: Negative for cough and chest tightness.    Cardiovascular: Negative for chest pain and palpitations.   Gastrointestinal: Negative for abdominal pain, constipation, diarrhea, nausea and vomiting.   Endocrine: Negative for polydipsia and polyuria.   Genitourinary: Negative for dysuria and frequency.   Musculoskeletal: Negative for back pain and myalgias.   Skin: Negative for color change and rash.   Allergic/Immunologic: Negative for food allergies and immunocompromised state.   Neurological: Negative for dizziness, syncope and headaches.   Hematological: Negative for adenopathy. Does not bruise/bleed easily.   Psychiatric/Behavioral: Negative for hallucinations and suicidal ideas. The patient is not nervous/anxious.        /80 (BP Location: Right arm, Patient Position: Sitting, Cuff Size: Adult)   Pulse 96   Temp 98.7 °F (37.1 °C) (Temporal)   Ht 185.4 cm (72.99\")   Wt 118 kg (260 lb 3.2 oz)   SpO2 96%   BMI 34.34 kg/m²   Office Visit on 04/19/2018   Component Date Value Ref Range Status   • Glucose 04/19/2018 117* 70 - 110 mg/dL Final   • BUN 04/19/2018 18  7 - 21 mg/dL Final   • Creatinine 04/19/2018 0.97  0.43 - 1.29 mg/dL Final   • Sodium " 04/19/2018 140  135 - 153 mmol/L Final   • Potassium 04/19/2018 3.6  3.5 - 5.3 mmol/L Final   • Chloride 04/19/2018 102  99 - 112 mmol/L Final   • CO2 04/19/2018 30.2  24.3 - 31.9 mmol/L Final   • Calcium 04/19/2018 9.7  7.7 - 10.0 mg/dL Final   • Total Protein 04/19/2018 7.4  6.0 - 8.0 g/dL Final   • Albumin 04/19/2018 4.30  3.50 - 5.00 g/dL Final   • ALT (SGPT) 04/19/2018 25  10 - 44 U/L Final   • AST (SGOT) 04/19/2018 20  10 - 34 U/L Final   • Alkaline Phosphatase 04/19/2018 56  40 - 129 U/L Final   • Total Bilirubin 04/19/2018 0.8  0.2 - 1.8 mg/dL Final   • eGFR Non African Amer 04/19/2018 83  >60 mL/min/1.73 Final   • Globulin 04/19/2018 3.1  gm/dL Final   • A/G Ratio 04/19/2018 1.4* 1.5 - 2.5 g/dL Final   • BUN/Creatinine Ratio 04/19/2018 18.6  7.0 - 25.0 Final   • Anion Gap 04/19/2018 7.8  3.6 - 11.2 mmol/L Final   • Total Cholesterol 04/19/2018 138  0 - 200 mg/dL Final   • Triglycerides 04/19/2018 95  0 - 150 mg/dL Final   • HDL Cholesterol 04/19/2018 30* 60 - 100 mg/dL Final   • LDL Cholesterol  04/19/2018 89  0 - 100 mg/dL Final   • VLDL Cholesterol 04/19/2018 19  mg/dL Final   • LDL/HDL Ratio 04/19/2018 2.97   Final   • Hemoglobin A1C 04/19/2018 5.90* 4.50 - 5.70 % Final   • TSH 04/19/2018 1.570  0.550 - 4.780 mIU/mL Final   • Testosterone, Total 04/19/2018 378.09  123.06 - 813.86 ng/dL Final   • Testosterone, Free 04/19/2018 7.1  6.8 - 21.5 pg/mL Final   • Osmolality Calc 04/19/2018 282.3  273.0 - 305.0 mOsm/kg Final       Physical Exam   Constitutional: He is oriented to person, place, and time. He appears well-developed and well-nourished.   HENT:   Head: Normocephalic and atraumatic.   Nose: Nose normal.   Mouth/Throat: Oropharynx is clear and moist.   Eyes: Conjunctivae and EOM are normal. Pupils are equal, round, and reactive to light.   Neck: Normal range of motion. Neck supple. No tracheal deviation present. No thyromegaly present.   Cardiovascular: Normal rate, regular rhythm, normal heart sounds  and intact distal pulses.   No murmur heard.  Pulmonary/Chest: Effort normal and breath sounds normal. No respiratory distress. He has no wheezes.   Abdominal: Soft. Bowel sounds are normal. There is no tenderness. There is no guarding.   Musculoskeletal: Normal range of motion. He exhibits no edema or tenderness.   Lymphadenopathy:     He has no cervical adenopathy.   Neurological: He is alert and oriented to person, place, and time.   Skin: Skin is warm and dry. No rash noted.   Psychiatric: He has a normal mood and affect. His behavior is normal.   Nursing note and vitals reviewed.      Assessment/Plan     Diagnoses and all orders for this visit:    SCOTT (generalized anxiety disorder)  -     FLUoxetine (PROzac) 40 MG capsule; Take 1 capsule by mouth Daily.    Allergic rhinitis due to pollen  -     fluticasone (FLONASE) 50 MCG/ACT nasal spray; 2 sprays into the nostril(s) as directed by provider Daily.    Mixed hyperlipidemia  -     Hemoglobin A1c; Future  -     Lipid Panel; Future    Essential hypertension  -     Comprehensive Metabolic Panel; Future    Allergic rhinitis due to pollen, unspecified seasonality    Anxiety    Other orders  -     losartan-hydrochlorothiazide (HYZAAR) 100-25 MG per tablet; Take 1 tablet by mouth Every Morning.  -     mirtazapine (REMERON) 15 MG tablet; Take 1 tablet by mouth At Night As Needed (insomnia).  -     potassium chloride (K-DUR) 10 MEQ CR tablet; Take 1 tablet by mouth Daily.                 This document has been electronically signed by:  Arianna Kim PA-C

## 2019-02-11 RX ORDER — MIRTAZAPINE 15 MG/1
TABLET, FILM COATED ORAL
Qty: 30 TABLET | Refills: 2 | Status: SHIPPED | OUTPATIENT
Start: 2019-02-11 | End: 2019-08-02 | Stop reason: SDUPTHER

## 2019-02-18 ENCOUNTER — LAB (OUTPATIENT)
Dept: FAMILY MEDICINE CLINIC | Facility: CLINIC | Age: 49
End: 2019-02-18

## 2019-02-18 DIAGNOSIS — E78.2 MIXED HYPERLIPIDEMIA: ICD-10-CM

## 2019-02-18 DIAGNOSIS — R53.82 CHRONIC FATIGUE: Primary | ICD-10-CM

## 2019-02-18 DIAGNOSIS — I10 ESSENTIAL HYPERTENSION: ICD-10-CM

## 2019-02-18 LAB
ALBUMIN SERPL-MCNC: 4.4 G/DL (ref 3.5–5)
ALBUMIN/GLOB SERPL: 1.5 G/DL (ref 1.5–2.5)
ALP SERPL-CCNC: 55 U/L (ref 40–129)
ALT SERPL W P-5'-P-CCNC: 28 U/L (ref 10–44)
ANION GAP SERPL CALCULATED.3IONS-SCNC: 6.8 MMOL/L (ref 3.6–11.2)
AST SERPL-CCNC: 23 U/L (ref 10–34)
BILIRUB SERPL-MCNC: 0.7 MG/DL (ref 0.2–1.8)
BUN BLD-MCNC: 12 MG/DL (ref 7–21)
BUN/CREAT SERPL: 14.5 (ref 7–25)
CALCIUM SPEC-SCNC: 9.4 MG/DL (ref 7.7–10)
CHLORIDE SERPL-SCNC: 103 MMOL/L (ref 99–112)
CHOLEST SERPL-MCNC: 259 MG/DL (ref 0–200)
CO2 SERPL-SCNC: 28.2 MMOL/L (ref 24.3–31.9)
CREAT BLD-MCNC: 0.83 MG/DL (ref 0.43–1.29)
GFR SERPL CREATININE-BSD FRML MDRD: 98 ML/MIN/1.73
GLOBULIN UR ELPH-MCNC: 2.9 GM/DL
GLUCOSE BLD-MCNC: 114 MG/DL (ref 70–110)
HBA1C MFR BLD: 6.4 % (ref 4.5–5.7)
HDLC SERPL-MCNC: 42 MG/DL (ref 60–100)
LDLC SERPL CALC-MCNC: 180 MG/DL (ref 0–100)
LDLC/HDLC SERPL: 4.28 {RATIO}
OSMOLALITY SERPL CALC.SUM OF ELEC: 276.3 MOSM/KG (ref 273–305)
POTASSIUM BLD-SCNC: 3.6 MMOL/L (ref 3.5–5.3)
PROT SERPL-MCNC: 7.3 G/DL (ref 6–8)
SODIUM BLD-SCNC: 138 MMOL/L (ref 135–153)
TRIGL SERPL-MCNC: 186 MG/DL (ref 0–150)
VLDLC SERPL-MCNC: 37.2 MG/DL

## 2019-02-18 PROCEDURE — 83036 HEMOGLOBIN GLYCOSYLATED A1C: CPT | Performed by: PHYSICIAN ASSISTANT

## 2019-02-18 PROCEDURE — 80061 LIPID PANEL: CPT | Performed by: PHYSICIAN ASSISTANT

## 2019-02-18 PROCEDURE — 80053 COMPREHEN METABOLIC PANEL: CPT | Performed by: PHYSICIAN ASSISTANT

## 2019-02-20 ENCOUNTER — TELEPHONE (OUTPATIENT)
Dept: FAMILY MEDICINE CLINIC | Facility: CLINIC | Age: 49
End: 2019-02-20

## 2019-02-20 NOTE — TELEPHONE ENCOUNTER
I called jamari informed labs were abnormal showing elevated cholrsterol. Be sure keep elder Friday to go to discuss details.          ----- Message from TUAN Chris sent at 2/19/2019  4:57 PM EST -----  Inform the patient that his lab work was abnormal showing elevated cholesterol.  We will discuss details of abnormal lab work at his visit on 2/22/2019.

## 2019-03-04 ENCOUNTER — OFFICE VISIT (OUTPATIENT)
Dept: FAMILY MEDICINE CLINIC | Facility: CLINIC | Age: 49
End: 2019-03-04

## 2019-03-04 VITALS
HEIGHT: 73 IN | DIASTOLIC BLOOD PRESSURE: 90 MMHG | SYSTOLIC BLOOD PRESSURE: 140 MMHG | WEIGHT: 260 LBS | HEART RATE: 71 BPM | OXYGEN SATURATION: 97 % | TEMPERATURE: 96.8 F | BODY MASS INDEX: 34.46 KG/M2

## 2019-03-04 DIAGNOSIS — E78.2 MIXED HYPERLIPIDEMIA: Primary | ICD-10-CM

## 2019-03-04 DIAGNOSIS — R73.9 HYPERGLYCEMIA: ICD-10-CM

## 2019-03-04 DIAGNOSIS — F41.9 ANXIETY: ICD-10-CM

## 2019-03-04 DIAGNOSIS — I10 ESSENTIAL HYPERTENSION: ICD-10-CM

## 2019-03-04 PROCEDURE — 99214 OFFICE O/P EST MOD 30 MIN: CPT | Performed by: PHYSICIAN ASSISTANT

## 2019-03-04 RX ORDER — BUPROPION HYDROCHLORIDE 150 MG/1
150 TABLET, EXTENDED RELEASE ORAL 2 TIMES DAILY
Qty: 60 TABLET | Refills: 5 | Status: SHIPPED | OUTPATIENT
Start: 2019-03-04 | End: 2019-10-14

## 2019-03-04 RX ORDER — ROSUVASTATIN CALCIUM 10 MG/1
10 TABLET, COATED ORAL DAILY
Qty: 30 TABLET | Refills: 5 | Status: SHIPPED | OUTPATIENT
Start: 2019-03-04 | End: 2019-07-20 | Stop reason: SDUPTHER

## 2019-03-07 NOTE — PROGRESS NOTES
Subjective   Kwadwo Franks is a 48 y.o. male.       Chief Complaint -hyperlipidemia    History of Present Illness -      Hyperlipidemia-  Not at goal.  He states that he quit taking Lipitor due to myalgias in legs.    Lab Results   Component Value Date    CHOL 259 (H) 02/18/2019    CHLPL 184 10/07/2014    TRIG 186 (H) 02/18/2019    HDL 42 (L) 02/18/2019     (H) 02/18/2019     Anxiety-  While symptoms have been significantly controlled with the use of Prozac he complains of sweating.  He reports that nighttime sweats are a significant issue.  He denies any SI or HI.  Stable    Hypertension-controlled with Hyzaar    Hypoglycemia-we discussed his elevation of hemoglobin A1c.  He continues to be prediabetic at this time.  Lab Results   Component Value Date    HGBA1C 6.40 (H) 02/18/2019       The following portions of the patient's history were reviewed and updated as appropriate: allergies, current medications, past family history, past medical history, past social history, past surgical history and problem list.    Review of Systems   Constitutional: Negative for activity change, appetite change and fever.   HENT: Negative for ear pain, sinus pressure and sore throat.    Eyes: Negative for pain and visual disturbance.   Respiratory: Negative for cough and chest tightness.    Cardiovascular: Negative for chest pain and palpitations.   Gastrointestinal: Negative for abdominal pain, constipation, diarrhea, nausea and vomiting.   Endocrine: Negative for polydipsia and polyuria.        Night sweats   Genitourinary: Negative for dysuria and frequency.   Musculoskeletal: Positive for myalgias. Negative for back pain.   Skin: Negative for color change and rash.   Allergic/Immunologic: Negative for food allergies and immunocompromised state.   Neurological: Negative for dizziness, syncope and headaches.   Hematological: Negative for adenopathy. Does not bruise/bleed easily.   Psychiatric/Behavioral: Negative for  "hallucinations and suicidal ideas. The patient is not nervous/anxious.        /90   Pulse 71   Temp 96.8 °F (36 °C) (Oral)   Ht 185.4 cm (72.99\")   Wt 118 kg (260 lb)   SpO2 97%   BMI 34.31 kg/m²   Lab on 02/18/2019   Component Date Value Ref Range Status   • Glucose 02/18/2019 114* 70 - 110 mg/dL Final   • BUN 02/18/2019 12  7 - 21 mg/dL Final   • Creatinine 02/18/2019 0.83  0.43 - 1.29 mg/dL Final   • Sodium 02/18/2019 138  135 - 153 mmol/L Final   • Potassium 02/18/2019 3.6  3.5 - 5.3 mmol/L Final   • Chloride 02/18/2019 103  99 - 112 mmol/L Final   • CO2 02/18/2019 28.2  24.3 - 31.9 mmol/L Final   • Calcium 02/18/2019 9.4  7.7 - 10.0 mg/dL Final   • Total Protein 02/18/2019 7.3  6.0 - 8.0 g/dL Final   • Albumin 02/18/2019 4.40  3.50 - 5.00 g/dL Final   • ALT (SGPT) 02/18/2019 28  10 - 44 U/L Final   • AST (SGOT) 02/18/2019 23  10 - 34 U/L Final   • Alkaline Phosphatase 02/18/2019 55  40 - 129 U/L Final   • Total Bilirubin 02/18/2019 0.7  0.2 - 1.8 mg/dL Final   • eGFR Non African Amer 02/18/2019 98  >60 mL/min/1.73 Final   • Globulin 02/18/2019 2.9  gm/dL Final   • A/G Ratio 02/18/2019 1.5  1.5 - 2.5 g/dL Final   • BUN/Creatinine Ratio 02/18/2019 14.5  7.0 - 25.0 Final   • Anion Gap 02/18/2019 6.8  3.6 - 11.2 mmol/L Final   • Hemoglobin A1C 02/18/2019 6.40* 4.50 - 5.70 % Final   • Total Cholesterol 02/18/2019 259* 0 - 200 mg/dL Final   • Triglycerides 02/18/2019 186* 0 - 150 mg/dL Final   • HDL Cholesterol 02/18/2019 42* 60 - 100 mg/dL Final   • LDL Cholesterol  02/18/2019 180* 0 - 100 mg/dL Final   • VLDL Cholesterol 02/18/2019 37.2  mg/dL Final   • LDL/HDL Ratio 02/18/2019 4.28   Final   • Osmolality Calc 02/18/2019 276.3  273.0 - 305.0 mOsm/kg Final       Physical Exam   Constitutional: He is oriented to person, place, and time. He appears well-developed and well-nourished.   HENT:   Head: Normocephalic and atraumatic.   Nose: Nose normal.   Mouth/Throat: Oropharynx is clear and moist.   Eyes: " Conjunctivae and EOM are normal. Pupils are equal, round, and reactive to light.   Neck: Normal range of motion. Neck supple. No tracheal deviation present. No thyromegaly present.   Cardiovascular: Normal rate, regular rhythm, normal heart sounds and intact distal pulses.   No murmur heard.  Pulmonary/Chest: Effort normal and breath sounds normal. No respiratory distress. He has no wheezes.   Abdominal: Soft. Bowel sounds are normal. There is no tenderness. There is no guarding.   Musculoskeletal: Normal range of motion. He exhibits no edema or tenderness.   Lymphadenopathy:     He has no cervical adenopathy.   Neurological: He is alert and oriented to person, place, and time.   Skin: Skin is warm and dry. No rash noted.   Psychiatric: He has a normal mood and affect. His behavior is normal.   Nursing note and vitals reviewed.      Assessment/Plan     Diagnoses and all orders for this visit:    Mixed hyperlipidemia  Comments:  Failed Lipitor due to myalgias  Start Crestor  Plan to recheck labs in 8 weeks  Orders:  -     rosuvastatin (CRESTOR) 10 MG tablet; Take 1 tablet by mouth Daily.  -     Lipid Panel; Future  -     Comprehensive Metabolic Panel; Future    Anxiety  Comments:  Discontinue Prozac due to night sweats  Start Wellbutrin  Orders:  -     buPROPion SR (WELLBUTRIN SR) 150 MG 12 hr tablet; Take 1 tablet by mouth 2 (Two) Times a Day.    Essential hypertension  -     Testosterone; Future    Hyperglycemia  Comments:  He was advised of prediabetic status.  He was advised to follow a low carbohydrate low cholesterol diet.  Orders:  -     Hemoglobin A1c; Future                 This document has been electronically signed by:  Arianna Kim PA-C

## 2019-04-05 DIAGNOSIS — J30.1 ALLERGIC RHINITIS DUE TO POLLEN: ICD-10-CM

## 2019-04-05 RX ORDER — POTASSIUM CHLORIDE 750 MG/1
10 TABLET, FILM COATED, EXTENDED RELEASE ORAL DAILY
Qty: 30 TABLET | Refills: 5 | Status: SHIPPED | OUTPATIENT
Start: 2019-04-05 | End: 2019-08-02 | Stop reason: SDUPTHER

## 2019-04-05 RX ORDER — FLUTICASONE PROPIONATE 50 MCG
SPRAY, SUSPENSION (ML) NASAL
Qty: 16 G | Refills: 4 | Status: SHIPPED | OUTPATIENT
Start: 2019-04-05 | End: 2019-05-14

## 2019-04-08 ENCOUNTER — OFFICE VISIT (OUTPATIENT)
Dept: FAMILY MEDICINE CLINIC | Facility: CLINIC | Age: 49
End: 2019-04-08

## 2019-04-08 VITALS
TEMPERATURE: 98 F | HEART RATE: 80 BPM | HEIGHT: 73 IN | WEIGHT: 264 LBS | SYSTOLIC BLOOD PRESSURE: 130 MMHG | BODY MASS INDEX: 34.99 KG/M2 | DIASTOLIC BLOOD PRESSURE: 80 MMHG | OXYGEN SATURATION: 97 %

## 2019-04-08 DIAGNOSIS — Z23 ENCOUNTER FOR IMMUNIZATION: ICD-10-CM

## 2019-04-08 DIAGNOSIS — J30.1 ALLERGIC RHINITIS DUE TO POLLEN, UNSPECIFIED SEASONALITY: ICD-10-CM

## 2019-04-08 DIAGNOSIS — E78.2 MIXED HYPERLIPIDEMIA: ICD-10-CM

## 2019-04-08 DIAGNOSIS — I10 ESSENTIAL HYPERTENSION: Primary | ICD-10-CM

## 2019-04-08 DIAGNOSIS — R73.9 HYPERGLYCEMIA: ICD-10-CM

## 2019-04-08 PROCEDURE — 99214 OFFICE O/P EST MOD 30 MIN: CPT | Performed by: PHYSICIAN ASSISTANT

## 2019-04-08 PROCEDURE — 90632 HEPA VACCINE ADULT IM: CPT | Performed by: PHYSICIAN ASSISTANT

## 2019-04-08 PROCEDURE — 90471 IMMUNIZATION ADMIN: CPT | Performed by: PHYSICIAN ASSISTANT

## 2019-04-08 NOTE — PROGRESS NOTES
Subjective   Kwadwo Franks is a 48 y.o. male.       Chief Complaint -hypertension    History of Present Illness -      Hypertension-  Well controlled with Hyzaar and K-Dur.    Hyperlipidemia-improved with Crestor  Lab Results   Component Value Date    CHOL 259 (H) 02/18/2019    CHLPL 184 10/07/2014    TRIG 186 (H) 02/18/2019    HDL 42 (L) 02/18/2019     (H) 02/18/2019     Allergic rhinitis-improved with Flonase    Hyperglycemia-his history of hyperglycemia due to prediabetes.  His last hemoglobin A1c was elevated.  He states that he does not currently have a blood glucose meter.  Lab Results   Component Value Date    HGBA1C 6.40 (H) 02/18/2019     Immunization-  He is due for his second dose of hepatitis A vaccine today.    The following portions of the patient's history were reviewed and updated as appropriate: allergies, current medications, past family history, past medical history, past social history, past surgical history and problem list.    Review of Systems   Constitutional: Negative for activity change, appetite change and fever.   HENT: Negative for ear pain, sinus pressure and sore throat.    Eyes: Negative for pain and visual disturbance.   Respiratory: Negative for cough and chest tightness.    Cardiovascular: Negative for chest pain and palpitations.   Gastrointestinal: Negative for abdominal pain, constipation, diarrhea, nausea and vomiting.   Endocrine: Negative for polydipsia and polyuria.   Genitourinary: Negative for dysuria and frequency.   Musculoskeletal: Negative for back pain and myalgias.   Skin: Negative for color change and rash.   Allergic/Immunologic: Negative for food allergies and immunocompromised state.   Neurological: Negative for dizziness, syncope and headaches.   Hematological: Negative for adenopathy. Does not bruise/bleed easily.   Psychiatric/Behavioral: Negative for hallucinations and suicidal ideas. The patient is not nervous/anxious.        /80   Pulse 80    "Temp 98 °F (36.7 °C) (Oral)   Ht 185.4 cm (72.99\")   Wt 120 kg (264 lb)   SpO2 97%   BMI 34.84 kg/m²   Lab on 02/18/2019   Component Date Value Ref Range Status   • Glucose 02/18/2019 114* 70 - 110 mg/dL Final   • BUN 02/18/2019 12  7 - 21 mg/dL Final   • Creatinine 02/18/2019 0.83  0.43 - 1.29 mg/dL Final   • Sodium 02/18/2019 138  135 - 153 mmol/L Final   • Potassium 02/18/2019 3.6  3.5 - 5.3 mmol/L Final   • Chloride 02/18/2019 103  99 - 112 mmol/L Final   • CO2 02/18/2019 28.2  24.3 - 31.9 mmol/L Final   • Calcium 02/18/2019 9.4  7.7 - 10.0 mg/dL Final   • Total Protein 02/18/2019 7.3  6.0 - 8.0 g/dL Final   • Albumin 02/18/2019 4.40  3.50 - 5.00 g/dL Final   • ALT (SGPT) 02/18/2019 28  10 - 44 U/L Final   • AST (SGOT) 02/18/2019 23  10 - 34 U/L Final   • Alkaline Phosphatase 02/18/2019 55  40 - 129 U/L Final   • Total Bilirubin 02/18/2019 0.7  0.2 - 1.8 mg/dL Final   • eGFR Non African Amer 02/18/2019 98  >60 mL/min/1.73 Final   • Globulin 02/18/2019 2.9  gm/dL Final   • A/G Ratio 02/18/2019 1.5  1.5 - 2.5 g/dL Final   • BUN/Creatinine Ratio 02/18/2019 14.5  7.0 - 25.0 Final   • Anion Gap 02/18/2019 6.8  3.6 - 11.2 mmol/L Final   • Hemoglobin A1C 02/18/2019 6.40* 4.50 - 5.70 % Final   • Total Cholesterol 02/18/2019 259* 0 - 200 mg/dL Final   • Triglycerides 02/18/2019 186* 0 - 150 mg/dL Final   • HDL Cholesterol 02/18/2019 42* 60 - 100 mg/dL Final   • LDL Cholesterol  02/18/2019 180* 0 - 100 mg/dL Final   • VLDL Cholesterol 02/18/2019 37.2  mg/dL Final   • LDL/HDL Ratio 02/18/2019 4.28   Final   • Osmolality Calc 02/18/2019 276.3  273.0 - 305.0 mOsm/kg Final       Physical Exam   Constitutional: He is oriented to person, place, and time. He appears well-developed and well-nourished.   HENT:   Head: Normocephalic and atraumatic.   Nose: Nose normal.   Mouth/Throat: Oropharynx is clear and moist.   Eyes: Conjunctivae and EOM are normal. Pupils are equal, round, and reactive to light.   Neck: Normal range of " motion. Neck supple. No tracheal deviation present. No thyromegaly present.   Cardiovascular: Normal rate, regular rhythm, normal heart sounds and intact distal pulses.   No murmur heard.  Pulmonary/Chest: Effort normal and breath sounds normal. No respiratory distress. He has no wheezes.   Abdominal: Soft. Bowel sounds are normal. There is no tenderness. There is no guarding.   Musculoskeletal: Normal range of motion. He exhibits no edema or tenderness.   Lymphadenopathy:     He has no cervical adenopathy.   Neurological: He is alert and oriented to person, place, and time.   Skin: Skin is warm and dry. No rash noted.   Psychiatric: He has a normal mood and affect. His behavior is normal.   Nursing note and vitals reviewed.      Assessment/Plan     Diagnoses and all orders for this visit:    Essential hypertension  Comments:  He was advised to continue Hyzaar and K-Dur    Mixed hyperlipidemia  Comments:  He was advised to continue Crestor and low-cholesterol diet    Allergic rhinitis due to pollen, unspecified seasonality  Comments:  Continue Flonase    Hyperglycemia  Comments:  He was given a blood glucose monitor that comes with 10 test strips today  Orders:  -     Hemoglobin A1c; Future    Encounter for immunization  Comments:  Hepatitis A vaccine dose #2 was given today    Other orders  -     Hepatitis A Vaccine Adult IM                 This document has been electronically signed by:  Arianna Kim PA-C

## 2019-05-08 DIAGNOSIS — I10 ESSENTIAL HYPERTENSION: ICD-10-CM

## 2019-05-09 RX ORDER — LOSARTAN POTASSIUM AND HYDROCHLOROTHIAZIDE 25; 100 MG/1; MG/1
1 TABLET ORAL EVERY MORNING
Qty: 30 TABLET | Refills: 5 | Status: SHIPPED | OUTPATIENT
Start: 2019-05-09 | End: 2019-08-02 | Stop reason: SDUPTHER

## 2019-05-14 ENCOUNTER — OFFICE VISIT (OUTPATIENT)
Dept: FAMILY MEDICINE CLINIC | Facility: CLINIC | Age: 49
End: 2019-05-14

## 2019-05-14 VITALS
SYSTOLIC BLOOD PRESSURE: 154 MMHG | HEART RATE: 80 BPM | TEMPERATURE: 98.4 F | OXYGEN SATURATION: 97 % | BODY MASS INDEX: 34.85 KG/M2 | HEIGHT: 73 IN | WEIGHT: 263 LBS | DIASTOLIC BLOOD PRESSURE: 90 MMHG

## 2019-05-14 DIAGNOSIS — F41.1 GAD (GENERALIZED ANXIETY DISORDER): Primary | ICD-10-CM

## 2019-05-14 DIAGNOSIS — I10 ESSENTIAL HYPERTENSION: ICD-10-CM

## 2019-05-14 PROCEDURE — 99214 OFFICE O/P EST MOD 30 MIN: CPT | Performed by: PHYSICIAN ASSISTANT

## 2019-05-17 NOTE — PROGRESS NOTES
"Charla Franks is a 48 y.o. male.       Chief Complaint -anxiety    History of Present Illness -       Anxiety-  He is here today with significantly increased anxiety due to acute distress.  He reports that he stopped taking his SSRI in the past because he was feeling much better.  Since that time he has had unforeseen acute stressors in his family causing him to have uncontrolled anxiety and emotional lability.  He denies any SI or HI.  Uncontrolled    Hypertension-not at goal today but he is upset emotionally.  Blood pressure prior to acute stressor was controlled with Hyzaar    The following portions of the patient's history were reviewed and updated as appropriate: allergies, current medications, past family history, past medical history, past social history, past surgical history and problem list.    Review of Systems   Constitutional: Negative for activity change, appetite change and fever.   HENT: Negative for ear pain, sinus pressure and sore throat.    Eyes: Negative for pain and visual disturbance.   Respiratory: Negative for cough and chest tightness.    Cardiovascular: Negative for chest pain and palpitations.   Gastrointestinal: Negative for abdominal pain, constipation, diarrhea, nausea and vomiting.   Endocrine: Negative for polydipsia and polyuria.   Genitourinary: Negative for dysuria and frequency.   Musculoskeletal: Negative for back pain and myalgias.   Skin: Negative for color change and rash.   Allergic/Immunologic: Negative for food allergies and immunocompromised state.   Neurological: Negative for dizziness, syncope and headaches.   Hematological: Negative for adenopathy. Does not bruise/bleed easily.   Psychiatric/Behavioral: Positive for dysphoric mood and sleep disturbance. Negative for hallucinations, self-injury and suicidal ideas. The patient is nervous/anxious.        /90   Pulse 80   Temp 98.4 °F (36.9 °C) (Oral)   Ht 185.4 cm (72.99\")   Wt 119 kg (263 lb)   SpO2 " 97%   BMI 34.71 kg/m²     Physical Exam   Constitutional: He is oriented to person, place, and time. He appears well-developed and well-nourished.   Cardiovascular: Normal rate, regular rhythm and normal heart sounds.   No murmur heard.  Pulmonary/Chest: Effort normal and breath sounds normal. He has no wheezes.   Neurological: He is alert and oriented to person, place, and time.   Skin: Skin is warm. No erythema.   Psychiatric: His behavior is normal. Thought content normal.   He is upset today and tearful   Nursing note and vitals reviewed.      Assessment/Plan     Diagnoses and all orders for this visit:    SCOTT (generalized anxiety disorder)  Comments:  Start Viibryd.  He was given a sample pack and expected to titrate from 10 mg up to 40 mg before he is seen back  Orders:  -     Vilazodone HCl (VIIBRYD) 10 & 20 & 40 MG kit; Take 10 mg by mouth Daily.    Essential hypertension  Comments:  No changes made to hypertensive medication at this time.  Hypertension likely uncontrolled due to acute stress    15 minutes of total face-to-face 25-minute office visit spent counseling the patient on treatment options for anxiety and coping mechanisms.  We discussed several different medications as well as therapy.  He declines psychotherapy at this time.             This document has been electronically signed by:  Arianna Kim PA-C

## 2019-06-10 ENCOUNTER — OFFICE VISIT (OUTPATIENT)
Dept: FAMILY MEDICINE CLINIC | Facility: CLINIC | Age: 49
End: 2019-06-10

## 2019-06-10 VITALS
OXYGEN SATURATION: 98 % | HEIGHT: 73 IN | SYSTOLIC BLOOD PRESSURE: 144 MMHG | TEMPERATURE: 97.6 F | WEIGHT: 273 LBS | DIASTOLIC BLOOD PRESSURE: 90 MMHG | BODY MASS INDEX: 36.18 KG/M2 | HEART RATE: 76 BPM

## 2019-06-10 DIAGNOSIS — E78.2 MIXED HYPERLIPIDEMIA: ICD-10-CM

## 2019-06-10 DIAGNOSIS — F41.1 GAD (GENERALIZED ANXIETY DISORDER): Primary | ICD-10-CM

## 2019-06-10 DIAGNOSIS — I10 ESSENTIAL HYPERTENSION: ICD-10-CM

## 2019-06-10 PROCEDURE — 99214 OFFICE O/P EST MOD 30 MIN: CPT | Performed by: PHYSICIAN ASSISTANT

## 2019-06-10 RX ORDER — FLUOXETINE HYDROCHLORIDE 40 MG/1
40 CAPSULE ORAL DAILY
Qty: 30 CAPSULE | Refills: 5 | Status: SHIPPED | OUTPATIENT
Start: 2019-06-10 | End: 2019-08-02 | Stop reason: SDUPTHER

## 2019-06-10 NOTE — PROGRESS NOTES
"Subjective   Kwadwo Franks is a 49 y.o. male.       Chief Complaint -  anxiety    History of Present Illness -      Anxiety- improved with viibryd but he reports side effect of increased appetite.  He denies and SI or HI.    Hypertension- stable with hyzaar.  He reports home BP readings less than 130/80 consistently    Hyperlipidemia- stable with crestor and low cholesterol diet  Lab Results   Component Value Date    CHOL 259 (H) 02/18/2019    CHLPL 184 10/07/2014    TRIG 186 (H) 02/18/2019    HDL 42 (L) 02/18/2019     (H) 02/18/2019         The following portions of the patient's history were reviewed and updated as appropriate: allergies, current medications, past family history, past medical history, past social history, past surgical history and problem list.    Review of Systems   Constitutional: Positive for appetite change. Negative for activity change and fever.   HENT: Negative for ear pain, sinus pressure and sore throat.    Eyes: Negative for pain and visual disturbance.   Respiratory: Negative for cough and chest tightness.    Cardiovascular: Negative for chest pain and palpitations.   Gastrointestinal: Negative for abdominal pain, constipation, diarrhea, nausea and vomiting.   Endocrine: Negative for polydipsia and polyuria.   Genitourinary: Negative for dysuria and frequency.   Musculoskeletal: Negative for back pain and myalgias.   Skin: Negative for color change and rash.   Allergic/Immunologic: Negative for food allergies and immunocompromised state.   Neurological: Negative for dizziness, syncope and headaches.   Hematological: Negative for adenopathy. Does not bruise/bleed easily.   Psychiatric/Behavioral: Negative for dysphoric mood, hallucinations, self-injury, sleep disturbance and suicidal ideas. The patient is nervous/anxious.        /90   Pulse 76   Temp 97.6 °F (36.4 °C) (Oral)   Ht 185.4 cm (72.99\")   Wt 124 kg (273 lb)   SpO2 98%   BMI 36.03 kg/m²   Lab on 02/18/2019 "   Component Date Value Ref Range Status   • Glucose 02/18/2019 114* 70 - 110 mg/dL Final   • BUN 02/18/2019 12  7 - 21 mg/dL Final   • Creatinine 02/18/2019 0.83  0.43 - 1.29 mg/dL Final   • Sodium 02/18/2019 138  135 - 153 mmol/L Final   • Potassium 02/18/2019 3.6  3.5 - 5.3 mmol/L Final   • Chloride 02/18/2019 103  99 - 112 mmol/L Final   • CO2 02/18/2019 28.2  24.3 - 31.9 mmol/L Final   • Calcium 02/18/2019 9.4  7.7 - 10.0 mg/dL Final   • Total Protein 02/18/2019 7.3  6.0 - 8.0 g/dL Final   • Albumin 02/18/2019 4.40  3.50 - 5.00 g/dL Final   • ALT (SGPT) 02/18/2019 28  10 - 44 U/L Final   • AST (SGOT) 02/18/2019 23  10 - 34 U/L Final   • Alkaline Phosphatase 02/18/2019 55  40 - 129 U/L Final   • Total Bilirubin 02/18/2019 0.7  0.2 - 1.8 mg/dL Final   • eGFR Non African Amer 02/18/2019 98  >60 mL/min/1.73 Final   • Globulin 02/18/2019 2.9  gm/dL Final   • A/G Ratio 02/18/2019 1.5  1.5 - 2.5 g/dL Final   • BUN/Creatinine Ratio 02/18/2019 14.5  7.0 - 25.0 Final   • Anion Gap 02/18/2019 6.8  3.6 - 11.2 mmol/L Final   • Hemoglobin A1C 02/18/2019 6.40* 4.50 - 5.70 % Final   • Total Cholesterol 02/18/2019 259* 0 - 200 mg/dL Final   • Triglycerides 02/18/2019 186* 0 - 150 mg/dL Final   • HDL Cholesterol 02/18/2019 42* 60 - 100 mg/dL Final   • LDL Cholesterol  02/18/2019 180* 0 - 100 mg/dL Final   • VLDL Cholesterol 02/18/2019 37.2  mg/dL Final   • LDL/HDL Ratio 02/18/2019 4.28   Final   • Osmolality Calc 02/18/2019 276.3  273.0 - 305.0 mOsm/kg Final       Physical Exam   Constitutional: He is oriented to person, place, and time. He appears well-developed and well-nourished.   HENT:   Head: Normocephalic and atraumatic.   Nose: Nose normal.   Mouth/Throat: Oropharynx is clear and moist.   Eyes: Conjunctivae and EOM are normal. Pupils are equal, round, and reactive to light.   Neck: Normal range of motion. Neck supple. No tracheal deviation present. No thyromegaly present.   Cardiovascular: Normal rate, regular rhythm,  normal heart sounds and intact distal pulses.   No murmur heard.  Pulmonary/Chest: Effort normal and breath sounds normal. No respiratory distress. He has no wheezes.   Abdominal: Soft. Bowel sounds are normal. There is no tenderness. There is no guarding.   Musculoskeletal: Normal range of motion. He exhibits no edema or tenderness.   Lymphadenopathy:     He has no cervical adenopathy.   Neurological: He is alert and oriented to person, place, and time.   Skin: Skin is warm and dry. No rash noted.   Psychiatric: He has a normal mood and affect. His behavior is normal.   Nursing note and vitals reviewed.      Assessment/Plan     Diagnoses and all orders for this visit:    SCOTT (generalized anxiety disorder)  Comments:  discontinue zoloft due to increased appetite  start prozac  Orders:  -     FLUoxetine (PROzac) 40 MG capsule; Take 1 capsule by mouth Daily.    Essential hypertension  Comments:  continue hyzaar    Mixed hyperlipidemia  Comments:  continue crestor and advised more strict low cholesterol diet                 This document has been electronically signed by:  Arianna Kim PA-C

## 2019-07-08 ENCOUNTER — LAB (OUTPATIENT)
Dept: FAMILY MEDICINE CLINIC | Facility: CLINIC | Age: 49
End: 2019-07-08

## 2019-07-08 DIAGNOSIS — R53.82 CHRONIC FATIGUE: ICD-10-CM

## 2019-07-08 DIAGNOSIS — R73.9 HYPERGLYCEMIA: ICD-10-CM

## 2019-07-08 DIAGNOSIS — E78.2 MIXED HYPERLIPIDEMIA: ICD-10-CM

## 2019-07-08 DIAGNOSIS — I10 ESSENTIAL HYPERTENSION: ICD-10-CM

## 2019-07-08 LAB
ALBUMIN SERPL-MCNC: 4 G/DL (ref 3.5–5.2)
ALBUMIN/GLOB SERPL: 1.4 G/DL
ALP SERPL-CCNC: 48 U/L (ref 39–117)
ALT SERPL W P-5'-P-CCNC: 22 U/L (ref 1–41)
ANION GAP SERPL CALCULATED.3IONS-SCNC: 14.5 MMOL/L (ref 5–15)
AST SERPL-CCNC: 17 U/L (ref 1–40)
BILIRUB SERPL-MCNC: 0.4 MG/DL (ref 0.2–1.2)
BUN BLD-MCNC: 15 MG/DL (ref 6–20)
BUN/CREAT SERPL: 15.8 (ref 7–25)
CALCIUM SPEC-SCNC: 9.1 MG/DL (ref 8.6–10.5)
CHLORIDE SERPL-SCNC: 102 MMOL/L (ref 98–107)
CHOLEST SERPL-MCNC: 152 MG/DL (ref 0–200)
CO2 SERPL-SCNC: 25.5 MMOL/L (ref 22–29)
CREAT BLD-MCNC: 0.95 MG/DL (ref 0.76–1.27)
GFR SERPL CREATININE-BSD FRML MDRD: 84 ML/MIN/1.73
GLOBULIN UR ELPH-MCNC: 2.8 GM/DL
GLUCOSE BLD-MCNC: 103 MG/DL (ref 65–99)
HBA1C MFR BLD: 6.2 % (ref 4.8–5.6)
HDLC SERPL-MCNC: 36 MG/DL (ref 40–60)
LDLC SERPL CALC-MCNC: 84 MG/DL (ref 0–100)
LDLC/HDLC SERPL: 2.33 {RATIO}
POTASSIUM BLD-SCNC: 4 MMOL/L (ref 3.5–5.2)
PROT SERPL-MCNC: 6.8 G/DL (ref 6–8.5)
SODIUM BLD-SCNC: 142 MMOL/L (ref 136–145)
TESTOST SERPL-MCNC: 378 NG/DL (ref 193–740)
TRIGL SERPL-MCNC: 161 MG/DL (ref 0–150)
VLDLC SERPL-MCNC: 32.2 MG/DL (ref 5–40)

## 2019-07-08 PROCEDURE — 80061 LIPID PANEL: CPT | Performed by: PHYSICIAN ASSISTANT

## 2019-07-08 PROCEDURE — 84403 ASSAY OF TOTAL TESTOSTERONE: CPT | Performed by: PHYSICIAN ASSISTANT

## 2019-07-08 PROCEDURE — 83036 HEMOGLOBIN GLYCOSYLATED A1C: CPT | Performed by: PHYSICIAN ASSISTANT

## 2019-07-08 PROCEDURE — 84402 ASSAY OF FREE TESTOSTERONE: CPT | Performed by: PHYSICIAN ASSISTANT

## 2019-07-08 PROCEDURE — 80053 COMPREHEN METABOLIC PANEL: CPT | Performed by: PHYSICIAN ASSISTANT

## 2019-07-10 LAB
TESTOST FREE SERPL-MCNC: 9.2 PG/ML (ref 7.2–24)
TESTOST SERPL-MCNC: 420.7 NG/DL (ref 264–916)

## 2019-07-15 ENCOUNTER — OFFICE VISIT (OUTPATIENT)
Dept: FAMILY MEDICINE CLINIC | Facility: CLINIC | Age: 49
End: 2019-07-15

## 2019-07-15 VITALS
HEIGHT: 73 IN | TEMPERATURE: 98.2 F | WEIGHT: 277 LBS | OXYGEN SATURATION: 98 % | SYSTOLIC BLOOD PRESSURE: 130 MMHG | DIASTOLIC BLOOD PRESSURE: 80 MMHG | HEART RATE: 74 BPM | BODY MASS INDEX: 36.71 KG/M2

## 2019-07-15 DIAGNOSIS — I10 ESSENTIAL HYPERTENSION: ICD-10-CM

## 2019-07-15 DIAGNOSIS — F41.9 ANXIETY: ICD-10-CM

## 2019-07-15 DIAGNOSIS — E78.2 MIXED HYPERLIPIDEMIA: Primary | ICD-10-CM

## 2019-07-15 PROCEDURE — 99214 OFFICE O/P EST MOD 30 MIN: CPT | Performed by: PHYSICIAN ASSISTANT

## 2019-07-20 RX ORDER — ROSUVASTATIN CALCIUM 10 MG/1
10 TABLET, COATED ORAL DAILY
Qty: 30 TABLET | Refills: 5 | Status: SHIPPED | OUTPATIENT
Start: 2019-07-20 | End: 2019-08-02 | Stop reason: SDUPTHER

## 2019-08-02 DIAGNOSIS — J30.1 ALLERGIC RHINITIS DUE TO POLLEN: ICD-10-CM

## 2019-08-02 DIAGNOSIS — E78.2 MIXED HYPERLIPIDEMIA: ICD-10-CM

## 2019-08-02 DIAGNOSIS — I10 ESSENTIAL HYPERTENSION: ICD-10-CM

## 2019-08-02 DIAGNOSIS — F41.1 GAD (GENERALIZED ANXIETY DISORDER): ICD-10-CM

## 2019-08-02 RX ORDER — FLUTICASONE PROPIONATE 50 MCG
2 SPRAY, SUSPENSION (ML) NASAL DAILY
Qty: 16 G | Refills: 5 | Status: SHIPPED | OUTPATIENT
Start: 2019-08-02 | End: 2019-09-08 | Stop reason: SDUPTHER

## 2019-08-02 RX ORDER — POTASSIUM CHLORIDE 750 MG/1
10 TABLET, FILM COATED, EXTENDED RELEASE ORAL DAILY
Qty: 30 TABLET | Refills: 5 | Status: SHIPPED | OUTPATIENT
Start: 2019-08-02 | End: 2019-09-08 | Stop reason: SDUPTHER

## 2019-08-02 RX ORDER — LOSARTAN POTASSIUM AND HYDROCHLOROTHIAZIDE 25; 100 MG/1; MG/1
1 TABLET ORAL EVERY MORNING
Qty: 30 TABLET | Refills: 5 | Status: SHIPPED | OUTPATIENT
Start: 2019-08-02 | End: 2019-09-08 | Stop reason: SDUPTHER

## 2019-08-02 RX ORDER — MIRTAZAPINE 15 MG/1
15 TABLET, FILM COATED ORAL NIGHTLY
Qty: 30 TABLET | Refills: 2 | Status: SHIPPED | OUTPATIENT
Start: 2019-08-02 | End: 2019-09-08 | Stop reason: SDUPTHER

## 2019-08-02 RX ORDER — FLUOXETINE HYDROCHLORIDE 40 MG/1
40 CAPSULE ORAL DAILY
Qty: 30 CAPSULE | Refills: 5 | Status: SHIPPED | OUTPATIENT
Start: 2019-08-02 | End: 2019-09-08 | Stop reason: SDUPTHER

## 2019-08-02 RX ORDER — ROSUVASTATIN CALCIUM 10 MG/1
10 TABLET, COATED ORAL DAILY
Qty: 30 TABLET | Refills: 5 | Status: SHIPPED | OUTPATIENT
Start: 2019-08-02 | End: 2019-09-08 | Stop reason: SDUPTHER

## 2019-09-08 DIAGNOSIS — J30.1 ALLERGIC RHINITIS DUE TO POLLEN: ICD-10-CM

## 2019-09-08 DIAGNOSIS — F41.1 GAD (GENERALIZED ANXIETY DISORDER): ICD-10-CM

## 2019-09-08 DIAGNOSIS — E78.2 MIXED HYPERLIPIDEMIA: ICD-10-CM

## 2019-09-08 DIAGNOSIS — I10 ESSENTIAL HYPERTENSION: ICD-10-CM

## 2019-09-09 RX ORDER — FLUOXETINE HYDROCHLORIDE 40 MG/1
40 CAPSULE ORAL DAILY
Qty: 30 CAPSULE | Refills: 5 | Status: SHIPPED | OUTPATIENT
Start: 2019-09-09 | End: 2019-10-14 | Stop reason: SDUPTHER

## 2019-09-09 RX ORDER — POTASSIUM CHLORIDE 750 MG/1
10 TABLET, FILM COATED, EXTENDED RELEASE ORAL DAILY
Qty: 30 TABLET | Refills: 5 | Status: SHIPPED | OUTPATIENT
Start: 2019-09-09 | End: 2019-10-14 | Stop reason: SDUPTHER

## 2019-09-09 RX ORDER — ROSUVASTATIN CALCIUM 10 MG/1
10 TABLET, COATED ORAL DAILY
Qty: 30 TABLET | Refills: 5 | Status: SHIPPED | OUTPATIENT
Start: 2019-09-09 | End: 2019-10-14 | Stop reason: SDUPTHER

## 2019-09-09 RX ORDER — LOSARTAN POTASSIUM AND HYDROCHLOROTHIAZIDE 25; 100 MG/1; MG/1
1 TABLET ORAL EVERY MORNING
Qty: 30 TABLET | Refills: 5 | Status: SHIPPED | OUTPATIENT
Start: 2019-09-09 | End: 2019-10-14 | Stop reason: SDUPTHER

## 2019-09-09 RX ORDER — FLUTICASONE PROPIONATE 50 MCG
2 SPRAY, SUSPENSION (ML) NASAL DAILY
Qty: 16 G | Refills: 5 | Status: SHIPPED | OUTPATIENT
Start: 2019-09-09 | End: 2019-10-14 | Stop reason: SDUPTHER

## 2019-09-09 RX ORDER — MIRTAZAPINE 15 MG/1
15 TABLET, FILM COATED ORAL NIGHTLY
Qty: 30 TABLET | Refills: 2 | Status: SHIPPED | OUTPATIENT
Start: 2019-09-09 | End: 2019-10-14 | Stop reason: SDUPTHER

## 2019-10-14 ENCOUNTER — OFFICE VISIT (OUTPATIENT)
Dept: FAMILY MEDICINE CLINIC | Facility: CLINIC | Age: 49
End: 2019-10-14

## 2019-10-14 VITALS
BODY MASS INDEX: 36.98 KG/M2 | WEIGHT: 279 LBS | HEART RATE: 72 BPM | OXYGEN SATURATION: 97 % | HEIGHT: 73 IN | DIASTOLIC BLOOD PRESSURE: 74 MMHG | TEMPERATURE: 97.5 F | SYSTOLIC BLOOD PRESSURE: 124 MMHG

## 2019-10-14 DIAGNOSIS — F41.1 GAD (GENERALIZED ANXIETY DISORDER): ICD-10-CM

## 2019-10-14 DIAGNOSIS — I10 ESSENTIAL HYPERTENSION: Primary | ICD-10-CM

## 2019-10-14 DIAGNOSIS — R73.03 PREDIABETES: ICD-10-CM

## 2019-10-14 DIAGNOSIS — J30.1 ALLERGIC RHINITIS DUE TO POLLEN, UNSPECIFIED SEASONALITY: ICD-10-CM

## 2019-10-14 DIAGNOSIS — Z23 NEED FOR IMMUNIZATION AGAINST INFLUENZA: ICD-10-CM

## 2019-10-14 DIAGNOSIS — E66.09 OBESITY DUE TO EXCESS CALORIES WITH SERIOUS COMORBIDITY, UNSPECIFIED CLASSIFICATION: ICD-10-CM

## 2019-10-14 DIAGNOSIS — J30.1 ALLERGIC RHINITIS DUE TO POLLEN: ICD-10-CM

## 2019-10-14 DIAGNOSIS — E78.2 MIXED HYPERLIPIDEMIA: ICD-10-CM

## 2019-10-14 DIAGNOSIS — F41.9 ANXIETY: ICD-10-CM

## 2019-10-14 DIAGNOSIS — F51.04 PSYCHOPHYSIOLOGICAL INSOMNIA: ICD-10-CM

## 2019-10-14 PROCEDURE — 99214 OFFICE O/P EST MOD 30 MIN: CPT | Performed by: PHYSICIAN ASSISTANT

## 2019-10-14 PROCEDURE — 90471 IMMUNIZATION ADMIN: CPT | Performed by: PHYSICIAN ASSISTANT

## 2019-10-14 PROCEDURE — 90674 CCIIV4 VAC NO PRSV 0.5 ML IM: CPT | Performed by: PHYSICIAN ASSISTANT

## 2019-10-14 RX ORDER — LOSARTAN POTASSIUM AND HYDROCHLOROTHIAZIDE 25; 100 MG/1; MG/1
1 TABLET ORAL EVERY MORNING
Qty: 30 TABLET | Refills: 5 | Status: SHIPPED | OUTPATIENT
Start: 2019-10-14 | End: 2020-01-16 | Stop reason: SDUPTHER

## 2019-10-14 RX ORDER — POTASSIUM CHLORIDE 750 MG/1
10 TABLET, FILM COATED, EXTENDED RELEASE ORAL DAILY
Qty: 30 TABLET | Refills: 5 | Status: SHIPPED | OUTPATIENT
Start: 2019-10-14 | End: 2020-01-16 | Stop reason: SDUPTHER

## 2019-10-14 RX ORDER — FLUTICASONE PROPIONATE 50 MCG
2 SPRAY, SUSPENSION (ML) NASAL DAILY
Qty: 16 G | Refills: 5 | Status: SHIPPED | OUTPATIENT
Start: 2019-10-14 | End: 2020-01-16 | Stop reason: SDUPTHER

## 2019-10-14 RX ORDER — MIRTAZAPINE 15 MG/1
7.5 TABLET, FILM COATED ORAL NIGHTLY
Qty: 15 TABLET | Refills: 5 | Status: SHIPPED | OUTPATIENT
Start: 2019-10-14 | End: 2020-01-16 | Stop reason: SDUPTHER

## 2019-10-14 RX ORDER — ROSUVASTATIN CALCIUM 10 MG/1
10 TABLET, COATED ORAL DAILY
Qty: 30 TABLET | Refills: 5 | Status: SHIPPED | OUTPATIENT
Start: 2019-10-14 | End: 2020-01-16 | Stop reason: SDUPTHER

## 2019-10-14 RX ORDER — FLUOXETINE HYDROCHLORIDE 40 MG/1
40 CAPSULE ORAL DAILY
Qty: 30 CAPSULE | Refills: 5 | Status: SHIPPED | OUTPATIENT
Start: 2019-10-14 | End: 2020-01-16 | Stop reason: SDUPTHER

## 2019-10-15 NOTE — PROGRESS NOTES
Subjective   Kwadwo Franks is a 49 y.o. male.       Chief Complaint -hypertension    History of Present Illness -      Hypertension-  Controlled with Hyzaar    Hyperlipidemia-improved with Crestor  Lab Results   Component Value Date    CHOL 152 07/08/2019    CHOL 259 (H) 02/18/2019    CHOL 138 04/19/2018    CHLPL 184 10/07/2014     Lab Results   Component Value Date    TRIG 161 (H) 07/08/2019    TRIG 186 (H) 02/18/2019    TRIG 95 04/19/2018     Lab Results   Component Value Date    HDL 36 (L) 07/08/2019    HDL 42 (L) 02/18/2019    HDL 30 (L) 04/19/2018     Lab Results   Component Value Date    LDL 84 07/08/2019     (H) 02/18/2019    LDL 89 04/19/2018     Allergic rhinitis-stable with Flonase    Obesity-not at goal but he continues to remain active daily and not eat fast food.    Anxiety-stable with Prozac alone.  He states that he did discontinue the Wellbutrin    Prediabetes-currently stable with diet    Generalized anxiety disorder-stable with Prozac    Insomnia-stable with Remeron as needed    The following portions of the patient's history were reviewed and updated as appropriate: allergies, current medications, past family history, past medical history, past social history, past surgical history and problem list.    Review of Systems   Constitutional: Negative for activity change, appetite change and fever.   HENT: Negative for ear pain, sinus pressure and sore throat.    Eyes: Negative for pain and visual disturbance.   Respiratory: Negative for cough and chest tightness.    Cardiovascular: Negative for chest pain and palpitations.   Gastrointestinal: Negative for abdominal pain, constipation, diarrhea, nausea and vomiting.   Endocrine: Negative for polydipsia and polyuria.   Genitourinary: Negative for dysuria and frequency.   Musculoskeletal: Negative for back pain and myalgias.   Skin: Negative for color change and rash.   Allergic/Immunologic: Negative for food allergies and immunocompromised state.  "  Neurological: Negative for dizziness, syncope and headaches.   Hematological: Negative for adenopathy. Does not bruise/bleed easily.   Psychiatric/Behavioral: Negative for hallucinations and suicidal ideas. The patient is not nervous/anxious.        /74   Pulse 72   Temp 97.5 °F (36.4 °C) (Oral)   Ht 185.4 cm (72.99\")   Wt 127 kg (279 lb)   SpO2 97%   BMI 36.82 kg/m²   Lab on 07/08/2019   Component Date Value Ref Range Status   • Testosterone, Total 07/08/2019 420.7  264.0 - 916.0 ng/dL Final   • Testosterone, Free 07/08/2019 9.2  7.2 - 24.0 pg/mL Final   • Total Cholesterol 07/08/2019 152  0 - 200 mg/dL Final   • Triglycerides 07/08/2019 161* 0 - 150 mg/dL Final   • HDL Cholesterol 07/08/2019 36* 40 - 60 mg/dL Final   • LDL Cholesterol  07/08/2019 84  0 - 100 mg/dL Final   • VLDL Cholesterol 07/08/2019 32.2  5 - 40 mg/dL Final   • LDL/HDL Ratio 07/08/2019 2.33   Final   • Glucose 07/08/2019 103* 65 - 99 mg/dL Final   • BUN 07/08/2019 15  6 - 20 mg/dL Final   • Creatinine 07/08/2019 0.95  0.76 - 1.27 mg/dL Final   • Sodium 07/08/2019 142  136 - 145 mmol/L Final   • Potassium 07/08/2019 4.0  3.5 - 5.2 mmol/L Final   • Chloride 07/08/2019 102  98 - 107 mmol/L Final   • CO2 07/08/2019 25.5  22.0 - 29.0 mmol/L Final   • Calcium 07/08/2019 9.1  8.6 - 10.5 mg/dL Final   • Total Protein 07/08/2019 6.8  6.0 - 8.5 g/dL Final   • Albumin 07/08/2019 4.00  3.50 - 5.20 g/dL Final   • ALT (SGPT) 07/08/2019 22  1 - 41 U/L Final   • AST (SGOT) 07/08/2019 17  1 - 40 U/L Final   • Alkaline Phosphatase 07/08/2019 48  39 - 117 U/L Final   • Total Bilirubin 07/08/2019 0.4  0.2 - 1.2 mg/dL Final   • eGFR Non  Amer 07/08/2019 84  >60 mL/min/1.73 Final   • Globulin 07/08/2019 2.8  gm/dL Final   • A/G Ratio 07/08/2019 1.4  g/dL Final   • BUN/Creatinine Ratio 07/08/2019 15.8  7.0 - 25.0 Final   • Anion Gap 07/08/2019 14.5  5.0 - 15.0 mmol/L Final   • Hemoglobin A1C 07/08/2019 6.20* 4.80 - 5.60 % Final   • Testosterone, " Total 07/08/2019 378.00  193.00 - 740.00 ng/dL Final       Physical Exam   Constitutional: He is oriented to person, place, and time. He appears well-developed and well-nourished.   HENT:   Head: Normocephalic and atraumatic.   Nose: Nose normal.   Mouth/Throat: Oropharynx is clear and moist.   Eyes: Conjunctivae and EOM are normal. Pupils are equal, round, and reactive to light.   Neck: Normal range of motion. Neck supple. No tracheal deviation present. No thyromegaly present.   Cardiovascular: Normal rate, regular rhythm, normal heart sounds and intact distal pulses.   No murmur heard.  Pulmonary/Chest: Effort normal and breath sounds normal. No respiratory distress. He has no wheezes.   Abdominal: Soft. Bowel sounds are normal. There is no tenderness. There is no guarding.   Musculoskeletal: Normal range of motion. He exhibits no edema or tenderness.   Lymphadenopathy:     He has no cervical adenopathy.   Neurological: He is alert and oriented to person, place, and time.   Skin: Skin is warm and dry. No rash noted.   Psychiatric: He has a normal mood and affect. His behavior is normal.   Nursing note and vitals reviewed.      Assessment/Plan     Diagnoses and all orders for this visit:    Essential hypertension  -     losartan-hydrochlorothiazide (HYZAAR) 100-25 MG per tablet; Take 1 tablet by mouth Every Morning.  -     Comprehensive Metabolic Panel; Future  -     Hemoglobin A1c; Future    Mixed hyperlipidemia  -     rosuvastatin (CRESTOR) 10 MG tablet; Take 1 tablet by mouth Daily.  -     Lipid Panel; Future    Allergic rhinitis due to pollen, unspecified seasonality    Obesity due to excess calories with serious comorbidity, unspecified classification    Anxiety    Prediabetes  -     Hemoglobin A1c; Future    SCOTT (generalized anxiety disorder)  -     FLUoxetine (PROzac) 40 MG capsule; Take 1 capsule by mouth Daily.    SCOTT (generalized anxiety disorder)  Comments:  discontinue zoloft due to increased  appetite  Continue Prozac  Orders:  -     FLUoxetine (PROzac) 40 MG capsule; Take 1 capsule by mouth Daily.    Allergic rhinitis due to pollen  -     fluticasone (FLONASE) 50 MCG/ACT nasal spray; 2 sprays into the nostril(s) as directed by provider Daily.    Mixed hyperlipidemia  Comments:  He was advised to follow a low-cholesterol diet  Orders:  -     rosuvastatin (CRESTOR) 10 MG tablet; Take 1 tablet by mouth Daily.  -     Lipid Panel; Future    Psychophysiological insomnia  -     mirtazapine (REMERON) 15 MG tablet; Take 0.5 tablets by mouth Every Night.    Need for immunization against influenza  -     Flucelvax Quad=>4Years (PFS); Standing  -     Flucelvax Quad=>4Years (PFS)    Other orders  -     potassium chloride (K-DUR) 10 MEQ CR tablet; Take 1 tablet by mouth Daily.                 This document has been electronically signed by:  Arianna Kim PA-C

## 2020-01-09 ENCOUNTER — LAB (OUTPATIENT)
Dept: FAMILY MEDICINE CLINIC | Facility: CLINIC | Age: 50
End: 2020-01-09

## 2020-01-09 DIAGNOSIS — E78.2 MIXED HYPERLIPIDEMIA: ICD-10-CM

## 2020-01-09 DIAGNOSIS — R73.03 PREDIABETES: ICD-10-CM

## 2020-01-09 DIAGNOSIS — I10 ESSENTIAL HYPERTENSION: ICD-10-CM

## 2020-01-09 LAB
ALBUMIN SERPL-MCNC: 4.1 G/DL (ref 3.5–5.2)
ALBUMIN/GLOB SERPL: 1.3 G/DL
ALP SERPL-CCNC: 49 U/L (ref 39–117)
ALT SERPL W P-5'-P-CCNC: 22 U/L (ref 1–41)
ANION GAP SERPL CALCULATED.3IONS-SCNC: 11.3 MMOL/L (ref 5–15)
AST SERPL-CCNC: 17 U/L (ref 1–40)
BILIRUB SERPL-MCNC: 0.5 MG/DL (ref 0.2–1.2)
BUN BLD-MCNC: 15 MG/DL (ref 6–20)
BUN/CREAT SERPL: 17.4 (ref 7–25)
CALCIUM SPEC-SCNC: 9.3 MG/DL (ref 8.6–10.5)
CHLORIDE SERPL-SCNC: 94 MMOL/L (ref 98–107)
CHOLEST SERPL-MCNC: 159 MG/DL (ref 0–200)
CO2 SERPL-SCNC: 27.7 MMOL/L (ref 22–29)
CREAT BLD-MCNC: 0.86 MG/DL (ref 0.76–1.27)
GFR SERPL CREATININE-BSD FRML MDRD: 94 ML/MIN/1.73
GLOBULIN UR ELPH-MCNC: 3.1 GM/DL
GLUCOSE BLD-MCNC: 105 MG/DL (ref 65–99)
HBA1C MFR BLD: 6.23 % (ref 4.8–5.6)
HDLC SERPL-MCNC: 37 MG/DL (ref 40–60)
LDLC SERPL CALC-MCNC: 102 MG/DL (ref 0–100)
LDLC/HDLC SERPL: 2.75 {RATIO}
POTASSIUM BLD-SCNC: 3.8 MMOL/L (ref 3.5–5.2)
PROT SERPL-MCNC: 7.2 G/DL (ref 6–8.5)
SODIUM BLD-SCNC: 133 MMOL/L (ref 136–145)
TRIGL SERPL-MCNC: 101 MG/DL (ref 0–150)
VLDLC SERPL-MCNC: 20.2 MG/DL (ref 5–40)

## 2020-01-09 PROCEDURE — 80061 LIPID PANEL: CPT | Performed by: PHYSICIAN ASSISTANT

## 2020-01-09 PROCEDURE — 36415 COLL VENOUS BLD VENIPUNCTURE: CPT | Performed by: PHYSICIAN ASSISTANT

## 2020-01-09 PROCEDURE — 83036 HEMOGLOBIN GLYCOSYLATED A1C: CPT | Performed by: PHYSICIAN ASSISTANT

## 2020-01-09 PROCEDURE — 80053 COMPREHEN METABOLIC PANEL: CPT | Performed by: PHYSICIAN ASSISTANT

## 2020-01-13 ENCOUNTER — TELEPHONE (OUTPATIENT)
Dept: FAMILY MEDICINE CLINIC | Facility: CLINIC | Age: 50
End: 2020-01-13

## 2020-01-13 NOTE — TELEPHONE ENCOUNTER
I called Kwadwo informed sodium level was low . He needs to cut back on water intake and eat more salt and keep elder on Thursday.                ----- Message from TUAN Chris sent at 1/10/2020  4:48 PM EST -----  Inform the patient that his sodium level was low.  I recommend that he restrict his water intake and eat more salt over the next week.  Plan to recheck labs at his visit next week

## 2020-01-16 ENCOUNTER — OFFICE VISIT (OUTPATIENT)
Dept: FAMILY MEDICINE CLINIC | Facility: CLINIC | Age: 50
End: 2020-01-16

## 2020-01-16 VITALS
SYSTOLIC BLOOD PRESSURE: 124 MMHG | HEIGHT: 73 IN | WEIGHT: 279 LBS | DIASTOLIC BLOOD PRESSURE: 80 MMHG | BODY MASS INDEX: 36.98 KG/M2 | HEART RATE: 64 BPM | OXYGEN SATURATION: 97 % | TEMPERATURE: 97.6 F

## 2020-01-16 DIAGNOSIS — E78.2 MIXED HYPERLIPIDEMIA: ICD-10-CM

## 2020-01-16 DIAGNOSIS — E87.1 HYPONATREMIA: ICD-10-CM

## 2020-01-16 DIAGNOSIS — J30.1 ALLERGIC RHINITIS DUE TO POLLEN: ICD-10-CM

## 2020-01-16 DIAGNOSIS — F51.04 PSYCHOPHYSIOLOGICAL INSOMNIA: ICD-10-CM

## 2020-01-16 DIAGNOSIS — F41.1 GAD (GENERALIZED ANXIETY DISORDER): ICD-10-CM

## 2020-01-16 DIAGNOSIS — I10 ESSENTIAL HYPERTENSION: ICD-10-CM

## 2020-01-16 DIAGNOSIS — J01.10 ACUTE NON-RECURRENT FRONTAL SINUSITIS: Primary | ICD-10-CM

## 2020-01-16 LAB
ALBUMIN SERPL-MCNC: 4.3 G/DL (ref 3.5–5.2)
ALBUMIN/GLOB SERPL: 1.5 G/DL
ALP SERPL-CCNC: 53 U/L (ref 39–117)
ALT SERPL W P-5'-P-CCNC: 33 U/L (ref 1–41)
ANION GAP SERPL CALCULATED.3IONS-SCNC: 14.7 MMOL/L (ref 5–15)
AST SERPL-CCNC: 21 U/L (ref 1–40)
BILIRUB SERPL-MCNC: 0.4 MG/DL (ref 0.2–1.2)
BUN BLD-MCNC: 18 MG/DL (ref 6–20)
BUN/CREAT SERPL: 16.7 (ref 7–25)
CALCIUM SPEC-SCNC: 9.4 MG/DL (ref 8.6–10.5)
CHLORIDE SERPL-SCNC: 95 MMOL/L (ref 98–107)
CO2 SERPL-SCNC: 27.3 MMOL/L (ref 22–29)
CREAT BLD-MCNC: 1.08 MG/DL (ref 0.76–1.27)
GFR SERPL CREATININE-BSD FRML MDRD: 72 ML/MIN/1.73
GLOBULIN UR ELPH-MCNC: 2.9 GM/DL
GLUCOSE BLD-MCNC: 117 MG/DL (ref 65–99)
POTASSIUM BLD-SCNC: 4 MMOL/L (ref 3.5–5.2)
PROT SERPL-MCNC: 7.2 G/DL (ref 6–8.5)
SODIUM BLD-SCNC: 137 MMOL/L (ref 136–145)

## 2020-01-16 PROCEDURE — 36415 COLL VENOUS BLD VENIPUNCTURE: CPT | Performed by: PHYSICIAN ASSISTANT

## 2020-01-16 PROCEDURE — 80053 COMPREHEN METABOLIC PANEL: CPT | Performed by: PHYSICIAN ASSISTANT

## 2020-01-16 PROCEDURE — 99214 OFFICE O/P EST MOD 30 MIN: CPT | Performed by: PHYSICIAN ASSISTANT

## 2020-01-16 RX ORDER — FLUOXETINE HYDROCHLORIDE 40 MG/1
40 CAPSULE ORAL DAILY
Qty: 30 CAPSULE | Refills: 5 | Status: SHIPPED | OUTPATIENT
Start: 2020-01-16 | End: 2020-02-27

## 2020-01-16 RX ORDER — POTASSIUM CHLORIDE 750 MG/1
10 TABLET, FILM COATED, EXTENDED RELEASE ORAL DAILY
Qty: 30 TABLET | Refills: 5 | Status: SHIPPED | OUTPATIENT
Start: 2020-01-16 | End: 2020-07-27 | Stop reason: SDUPTHER

## 2020-01-16 RX ORDER — LOSARTAN POTASSIUM AND HYDROCHLOROTHIAZIDE 25; 100 MG/1; MG/1
1 TABLET ORAL EVERY MORNING
Qty: 30 TABLET | Refills: 5 | Status: SHIPPED | OUTPATIENT
Start: 2020-01-16 | End: 2020-02-27

## 2020-01-16 RX ORDER — ROSUVASTATIN CALCIUM 10 MG/1
10 TABLET, COATED ORAL DAILY
Qty: 30 TABLET | Refills: 5 | Status: SHIPPED | OUTPATIENT
Start: 2020-01-16 | End: 2020-07-27 | Stop reason: SDUPTHER

## 2020-01-16 RX ORDER — FLUTICASONE PROPIONATE 50 MCG
2 SPRAY, SUSPENSION (ML) NASAL DAILY
Qty: 16 G | Refills: 5 | Status: SHIPPED | OUTPATIENT
Start: 2020-01-16 | End: 2021-01-26

## 2020-01-16 RX ORDER — AMOXICILLIN AND CLAVULANATE POTASSIUM 875; 125 MG/1; MG/1
1 TABLET, FILM COATED ORAL 2 TIMES DAILY
Qty: 20 TABLET | Refills: 0 | Status: SHIPPED | OUTPATIENT
Start: 2020-01-16 | End: 2020-04-30

## 2020-01-16 RX ORDER — MIRTAZAPINE 15 MG/1
7.5 TABLET, FILM COATED ORAL NIGHTLY
Qty: 15 TABLET | Refills: 5 | Status: SHIPPED | OUTPATIENT
Start: 2020-01-16 | End: 2021-12-27

## 2020-01-16 NOTE — PROGRESS NOTES
Subjective   Kwadwo Franks is a 49 y.o. male.       Chief Complaint -facial pressure    History of Present Illness -      Facial pressure-  He complains of pain and pressure and he is bilateral forehead, sore throat, bilateral ear pressure, and headache for the past week.  Minimal relief with the 1 old pill of amoxicillin that he had at home.  Minimal relief with Flonase.    Anxiety-significantly improved with Prozac.  He denies any SI or HI.    Allergic rhinitis-not at goal with Flonase due to acute illness    Hypertension-controlled with Hyzaar    Insomnia-stable with Remeron as needed    Hyperlipidemia-stable with Crestor  Lab Results   Component Value Date    CHOL 159 01/09/2020    CHOL 152 07/08/2019    CHOL 259 (H) 02/18/2019    CHLPL 184 10/07/2014     Lab Results   Component Value Date    TRIG 101 01/09/2020    TRIG 161 (H) 07/08/2019    TRIG 186 (H) 02/18/2019     Lab Results   Component Value Date    HDL 37 (L) 01/09/2020    HDL 36 (L) 07/08/2019    HDL 42 (L) 02/18/2019     Lab Results   Component Value Date     (H) 01/09/2020    LDL 84 07/08/2019     (H) 02/18/2019     Hyponatremia- he states that he has been restricting his fluids and has started drinking 1 Gatorade instead of water with supper.  This is due to be rechecked.    The following portions of the patient's history were reviewed and updated as appropriate: allergies, current medications, past family history, past medical history, past social history, past surgical history and problem list.    Review of Systems   Constitutional: Negative for activity change, appetite change, fatigue and fever.   HENT: Positive for congestion, ear pain, sinus pressure, sinus pain and sore throat.    Eyes: Negative for pain and visual disturbance.   Respiratory: Negative for cough and chest tightness.    Cardiovascular: Negative for chest pain and palpitations.   Gastrointestinal: Negative for abdominal pain, constipation, diarrhea, nausea and  "vomiting.   Endocrine: Negative for polydipsia and polyuria.   Genitourinary: Negative for dysuria and frequency.   Musculoskeletal: Negative for back pain and myalgias.   Skin: Negative for color change and rash.   Allergic/Immunologic: Negative for food allergies and immunocompromised state.   Neurological: Positive for headaches. Negative for dizziness and syncope.   Hematological: Negative for adenopathy. Does not bruise/bleed easily.   Psychiatric/Behavioral: Negative for hallucinations and suicidal ideas. The patient is not nervous/anxious.        /80   Pulse 64   Temp 97.6 °F (36.4 °C) (Oral)   Ht 185.4 cm (72.99\")   Wt 127 kg (279 lb)   SpO2 97%   BMI 36.82 kg/m²   Lab on 01/09/2020   Component Date Value Ref Range Status   • Glucose 01/09/2020 105* 65 - 99 mg/dL Final   • BUN 01/09/2020 15  6 - 20 mg/dL Final   • Creatinine 01/09/2020 0.86  0.76 - 1.27 mg/dL Final   • Sodium 01/09/2020 133* 136 - 145 mmol/L Final   • Potassium 01/09/2020 3.8  3.5 - 5.2 mmol/L Final   • Chloride 01/09/2020 94* 98 - 107 mmol/L Final   • CO2 01/09/2020 27.7  22.0 - 29.0 mmol/L Final   • Calcium 01/09/2020 9.3  8.6 - 10.5 mg/dL Final   • Total Protein 01/09/2020 7.2  6.0 - 8.5 g/dL Final   • Albumin 01/09/2020 4.10  3.50 - 5.20 g/dL Final   • ALT (SGPT) 01/09/2020 22  1 - 41 U/L Final   • AST (SGOT) 01/09/2020 17  1 - 40 U/L Final   • Alkaline Phosphatase 01/09/2020 49  39 - 117 U/L Final   • Total Bilirubin 01/09/2020 0.5  0.2 - 1.2 mg/dL Final   • eGFR Non African Amer 01/09/2020 94  >60 mL/min/1.73 Final   • Globulin 01/09/2020 3.1  gm/dL Final   • A/G Ratio 01/09/2020 1.3  g/dL Final   • BUN/Creatinine Ratio 01/09/2020 17.4  7.0 - 25.0 Final   • Anion Gap 01/09/2020 11.3  5.0 - 15.0 mmol/L Final   • Total Cholesterol 01/09/2020 159  0 - 200 mg/dL Final   • Triglycerides 01/09/2020 101  0 - 150 mg/dL Final   • HDL Cholesterol 01/09/2020 37* 40 - 60 mg/dL Final   • LDL Cholesterol  01/09/2020 102* 0 - 100 mg/dL " Final   • VLDL Cholesterol 01/09/2020 20.2  5 - 40 mg/dL Final   • LDL/HDL Ratio 01/09/2020 2.75   Final   • Hemoglobin A1C 01/09/2020 6.23* 4.80 - 5.60 % Final       Physical Exam   Constitutional: He is oriented to person, place, and time. He appears well-developed and well-nourished.   HENT:   Head: Normocephalic and atraumatic.   Nose: Nose normal.   Tenderness noted over bilateral frontal sinus area.  Oropharynx erythematous without exudates.  TMs appear normal without bulging or erythema.   Eyes: Pupils are equal, round, and reactive to light. Conjunctivae and EOM are normal.   Neck: Normal range of motion. Neck supple. No tracheal deviation present. No thyromegaly present.   Cardiovascular: Normal rate, regular rhythm, normal heart sounds and intact distal pulses.   No murmur heard.  Pulmonary/Chest: Effort normal and breath sounds normal. No respiratory distress. He has no wheezes.   Abdominal: Soft. Bowel sounds are normal. There is no tenderness. There is no guarding.   Musculoskeletal: Normal range of motion. He exhibits no edema or tenderness.   Lymphadenopathy:     He has no cervical adenopathy.   Neurological: He is alert and oriented to person, place, and time.   Skin: Skin is warm and dry. No rash noted.   Psychiatric: He has a normal mood and affect. His behavior is normal.   Nursing note and vitals reviewed.      Assessment/Plan     Diagnoses and all orders for this visit:    Acute non-recurrent frontal sinusitis  -     amoxicillin-clavulanate (AUGMENTIN) 875-125 MG per tablet; Take 1 tablet by mouth 2 (Two) Times a Day.    SCOTT (generalized anxiety disorder)  Comments:  discontinue zoloft due to increased appetite  Continue Prozac  Orders:  -     FLUoxetine (PROzac) 40 MG capsule; Take 1 capsule by mouth Daily.    Allergic rhinitis due to pollen  -     fluticasone (FLONASE) 50 MCG/ACT nasal spray; 2 sprays into the nostril(s) as directed by provider Daily.    Essential hypertension  -      losartan-hydrochlorothiazide (HYZAAR) 100-25 MG per tablet; Take 1 tablet by mouth Every Morning.    Psychophysiological insomnia  Comments:  Continue Remeron  Orders:  -     mirtazapine (REMERON) 15 MG tablet; Take 0.5 tablets by mouth Every Night.    Mixed hyperlipidemia  Comments:  He was advised to follow a low-cholesterol diet  Orders:  -     rosuvastatin (CRESTOR) 10 MG tablet; Take 1 tablet by mouth Daily.    Hyponatremia  Comments:  Recheck electrolytes today.  Hyponatremia likely due to volume overload.  Orders:  -     Comprehensive Metabolic Panel    Other orders  -     potassium chloride (K-DUR) 10 MEQ CR tablet; Take 1 tablet by mouth Daily.            This document has been electronically signed by:  Arianna Kim PA-C

## 2020-01-23 RX ORDER — OSELTAMIVIR PHOSPHATE 75 MG/1
75 CAPSULE ORAL 2 TIMES DAILY
Qty: 10 CAPSULE | Refills: 0 | Status: SHIPPED | OUTPATIENT
Start: 2020-01-23 | End: 2020-04-30

## 2020-02-27 DIAGNOSIS — I10 ESSENTIAL HYPERTENSION: ICD-10-CM

## 2020-02-27 DIAGNOSIS — F41.1 GAD (GENERALIZED ANXIETY DISORDER): ICD-10-CM

## 2020-02-27 RX ORDER — FLUOXETINE HYDROCHLORIDE 40 MG/1
CAPSULE ORAL
Qty: 30 CAPSULE | Refills: 5 | Status: SHIPPED | OUTPATIENT
Start: 2020-02-27 | End: 2020-07-27 | Stop reason: SDUPTHER

## 2020-02-27 RX ORDER — LOSARTAN POTASSIUM AND HYDROCHLOROTHIAZIDE 25; 100 MG/1; MG/1
1 TABLET ORAL EVERY MORNING
Qty: 30 TABLET | Refills: 5 | Status: SHIPPED | OUTPATIENT
Start: 2020-02-27 | End: 2020-07-27 | Stop reason: SDUPTHER

## 2020-04-30 ENCOUNTER — TELEMEDICINE (OUTPATIENT)
Dept: FAMILY MEDICINE CLINIC | Facility: CLINIC | Age: 50
End: 2020-04-30

## 2020-04-30 VITALS
HEIGHT: 73 IN | HEART RATE: 70 BPM | DIASTOLIC BLOOD PRESSURE: 85 MMHG | WEIGHT: 270 LBS | BODY MASS INDEX: 35.78 KG/M2 | SYSTOLIC BLOOD PRESSURE: 125 MMHG

## 2020-04-30 DIAGNOSIS — E78.2 MIXED HYPERLIPIDEMIA: ICD-10-CM

## 2020-04-30 DIAGNOSIS — F41.1 GAD (GENERALIZED ANXIETY DISORDER): Primary | ICD-10-CM

## 2020-04-30 DIAGNOSIS — E66.01 CLASS 2 SEVERE OBESITY DUE TO EXCESS CALORIES WITH SERIOUS COMORBIDITY AND BODY MASS INDEX (BMI) OF 35.0 TO 35.9 IN ADULT (HCC): ICD-10-CM

## 2020-04-30 DIAGNOSIS — I10 ESSENTIAL HYPERTENSION: ICD-10-CM

## 2020-04-30 PROCEDURE — 99213 OFFICE O/P EST LOW 20 MIN: CPT | Performed by: PHYSICIAN ASSISTANT

## 2020-04-30 NOTE — PROGRESS NOTES
Subjective   Kwadwo Franks is a 49 y.o. male.     Video Visit    You have chosen to receive care through a telehealth visit.  Do you consent to use a video/audio connection for your medical care today? Yes    Chief Complaint -anxiety    History of Present Illness -      Anxiety-  He reports significant improvement in his nervousness and anxiety with the use of Prozac.  He denies any unwanted side effects with use of Prozac.  He denies any SI or HI.    Obesity-  He has lost approximately 9 pounds per his report of current weight of 270 pounds.  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  He states that he remains physically active during his job.  He states that his diet has not been the best due to the recent quarantine and current eating schedule.  Not at goal    Hypertension-controlled with Hyzaar.  He does take potassium for supplementation as well.    Hyperlipidemia-stable with Crestor    The following portions of the patient's history were reviewed and updated as appropriate: allergies, current medications, past family history, past medical history, past social history, past surgical history and problem list.    Review of Systems   Constitutional: Positive for activity change. Negative for appetite change, fatigue and fever.   HENT: Negative for ear pain, sinus pressure and sore throat.    Eyes: Negative for pain and visual disturbance.   Respiratory: Negative for cough and chest tightness.    Cardiovascular: Negative for chest pain and palpitations.   Gastrointestinal: Negative for abdominal pain, constipation, diarrhea, nausea and vomiting.   Endocrine: Negative for polydipsia and polyuria.   Genitourinary: Negative for dysuria and frequency.   Musculoskeletal: Negative for back pain and myalgias.   Skin: Negative for color change and rash.   Allergic/Immunologic: Negative for food allergies and immunocompromised state.   Neurological: Negative for dizziness, syncope and  "headaches.   Hematological: Negative for adenopathy. Does not bruise/bleed easily.   Psychiatric/Behavioral: Negative for hallucinations and suicidal ideas. The patient is nervous/anxious.        /85   Pulse 70   Ht 185.4 cm (73\")   Wt 122 kg (270 lb)   BMI 35.62 kg/m²   Office Visit on 01/16/2020   Component Date Value Ref Range Status   • Glucose 01/16/2020 117* 65 - 99 mg/dL Final   • BUN 01/16/2020 18  6 - 20 mg/dL Final   • Creatinine 01/16/2020 1.08  0.76 - 1.27 mg/dL Final   • Sodium 01/16/2020 137  136 - 145 mmol/L Final   • Potassium 01/16/2020 4.0  3.5 - 5.2 mmol/L Final   • Chloride 01/16/2020 95* 98 - 107 mmol/L Final   • CO2 01/16/2020 27.3  22.0 - 29.0 mmol/L Final   • Calcium 01/16/2020 9.4  8.6 - 10.5 mg/dL Final   • Total Protein 01/16/2020 7.2  6.0 - 8.5 g/dL Final   • Albumin 01/16/2020 4.30  3.50 - 5.20 g/dL Final   • ALT (SGPT) 01/16/2020 33  1 - 41 U/L Final   • AST (SGOT) 01/16/2020 21  1 - 40 U/L Final   • Alkaline Phosphatase 01/16/2020 53  39 - 117 U/L Final   • Total Bilirubin 01/16/2020 0.4  0.2 - 1.2 mg/dL Final   • eGFR Non African Amer 01/16/2020 72  >60 mL/min/1.73 Final   • Globulin 01/16/2020 2.9  gm/dL Final   • A/G Ratio 01/16/2020 1.5  g/dL Final   • BUN/Creatinine Ratio 01/16/2020 16.7  7.0 - 25.0 Final   • Anion Gap 01/16/2020 14.7  5.0 - 15.0 mmol/L Final       Physical Exam   Constitutional: He is oriented to person, place, and time. He appears well-developed and well-nourished.   HENT:   Head: Normocephalic and atraumatic.   Eyes: Conjunctivae and EOM are normal.   Neck: Normal range of motion. Neck supple.   Pulmonary/Chest: Effort normal.   Neurological: He is alert and oriented to person, place, and time.   Skin: No rash noted. No erythema.   Psychiatric: He has a normal mood and affect. His behavior is normal. Thought content normal.   Vitals reviewed.      Assessment/Plan     Diagnoses and all orders for this visit:    SCOTT (generalized anxiety " disorder)  Comments:  Continue Prozac as this seems to be beneficial  Advised to report any symptom change or new symptoms    Class 2 severe obesity due to excess calories with serious comorbidity and body mass index (BMI) of 35.0 to 35.9 in adult (CMS/[facility])  Comments:  Advised low-cholesterol diet    Essential hypertension  Comments:  Continue Hyzaar  Continue to monitor blood pressure and report any consistent readings greater than 130/80    Mixed hyperlipidemia  Comments:  Advised low-cholesterol diet  Continue Crestor            This document has been electronically signed by:  Arianna Kim PA-C

## 2020-07-27 ENCOUNTER — RESULTS ENCOUNTER (OUTPATIENT)
Dept: FAMILY MEDICINE CLINIC | Facility: CLINIC | Age: 50
End: 2020-07-27

## 2020-07-27 ENCOUNTER — OFFICE VISIT (OUTPATIENT)
Dept: FAMILY MEDICINE CLINIC | Facility: CLINIC | Age: 50
End: 2020-07-27

## 2020-07-27 VITALS
SYSTOLIC BLOOD PRESSURE: 124 MMHG | DIASTOLIC BLOOD PRESSURE: 78 MMHG | HEART RATE: 65 BPM | WEIGHT: 280.8 LBS | HEIGHT: 73 IN | OXYGEN SATURATION: 97 % | TEMPERATURE: 97.3 F | BODY MASS INDEX: 37.22 KG/M2

## 2020-07-27 DIAGNOSIS — Z12.11 COLON CANCER SCREENING: ICD-10-CM

## 2020-07-27 DIAGNOSIS — I10 ESSENTIAL HYPERTENSION: ICD-10-CM

## 2020-07-27 DIAGNOSIS — F41.1 GAD (GENERALIZED ANXIETY DISORDER): ICD-10-CM

## 2020-07-27 DIAGNOSIS — Z12.11 COLON CANCER SCREENING: Primary | ICD-10-CM

## 2020-07-27 DIAGNOSIS — E78.2 MIXED HYPERLIPIDEMIA: ICD-10-CM

## 2020-07-27 PROCEDURE — 99214 OFFICE O/P EST MOD 30 MIN: CPT | Performed by: PHYSICIAN ASSISTANT

## 2020-07-27 RX ORDER — ROSUVASTATIN CALCIUM 10 MG/1
10 TABLET, COATED ORAL DAILY
Qty: 30 TABLET | Refills: 5 | Status: SHIPPED | OUTPATIENT
Start: 2020-07-27 | End: 2021-03-02

## 2020-07-27 RX ORDER — FLUOXETINE HYDROCHLORIDE 40 MG/1
40 CAPSULE ORAL DAILY
Qty: 30 CAPSULE | Refills: 5 | Status: SHIPPED | OUTPATIENT
Start: 2020-07-27 | End: 2021-05-08

## 2020-07-27 RX ORDER — LOSARTAN POTASSIUM AND HYDROCHLOROTHIAZIDE 25; 100 MG/1; MG/1
1 TABLET ORAL EVERY MORNING
Qty: 30 TABLET | Refills: 5 | Status: SHIPPED | OUTPATIENT
Start: 2020-07-27 | End: 2021-07-22

## 2020-07-27 RX ORDER — CETIRIZINE HYDROCHLORIDE 10 MG/1
10 TABLET ORAL DAILY
COMMUNITY
End: 2021-12-27

## 2020-07-27 RX ORDER — POTASSIUM CHLORIDE 750 MG/1
10 TABLET, FILM COATED, EXTENDED RELEASE ORAL DAILY
Qty: 30 TABLET | Refills: 5 | Status: SHIPPED | OUTPATIENT
Start: 2020-07-27 | End: 2021-09-28

## 2020-07-27 NOTE — PROGRESS NOTES
"Subjective   Kwadwo Franks is a 50 y.o. male.       Chief Complaint -anxiety       History of Present Illness -      Anxiety-  Stable with Prozac.  He does continue to take Rozerem to help him sleep at night as needed.  He denies any SI or HI.    Hypertension-  Controlled with Hyzaar and potassium.    Hyperlipidemia-stable with Crestor and diet.  He states that he has not been exercising lately.  He has gained 10 pounds in the past 3 months due to lack of exercise.  He states that he still watches what he eats and is making healthy choices.    Health maintenance-  He is due for colon cancer screening.  He is a candidate for Cologuard.    The following portions of the patient's history were reviewed and updated as appropriate: allergies, current medications, past family history, past medical history, past social history, past surgical history and problem list.    Review of Systems   Constitutional: Positive for activity change and unexpected weight change. Negative for appetite change, fatigue and fever.   HENT: Negative for ear pain, sinus pressure and sore throat.    Eyes: Negative for pain and visual disturbance.   Respiratory: Negative for cough and chest tightness.    Cardiovascular: Negative for chest pain and palpitations.   Gastrointestinal: Negative for abdominal pain, constipation, diarrhea, nausea and vomiting.   Endocrine: Negative for polydipsia and polyuria.   Genitourinary: Negative for dysuria and frequency.   Musculoskeletal: Negative for back pain and myalgias.   Skin: Negative for color change and rash.   Allergic/Immunologic: Negative for food allergies and immunocompromised state.   Neurological: Negative for dizziness, syncope and headaches.   Hematological: Negative for adenopathy. Does not bruise/bleed easily.   Psychiatric/Behavioral: Negative for hallucinations and suicidal ideas. The patient is not nervous/anxious.        /78   Pulse 65   Temp 97.3 °F (36.3 °C)   Ht 185.4 cm (73\")   " Wt 127 kg (280 lb 12.8 oz)   SpO2 97%   BMI 37.05 kg/m²   Office Visit on 01/16/2020   Component Date Value Ref Range Status   • Glucose 01/16/2020 117* 65 - 99 mg/dL Final   • BUN 01/16/2020 18  6 - 20 mg/dL Final   • Creatinine 01/16/2020 1.08  0.76 - 1.27 mg/dL Final   • Sodium 01/16/2020 137  136 - 145 mmol/L Final   • Potassium 01/16/2020 4.0  3.5 - 5.2 mmol/L Final   • Chloride 01/16/2020 95* 98 - 107 mmol/L Final   • CO2 01/16/2020 27.3  22.0 - 29.0 mmol/L Final   • Calcium 01/16/2020 9.4  8.6 - 10.5 mg/dL Final   • Total Protein 01/16/2020 7.2  6.0 - 8.5 g/dL Final   • Albumin 01/16/2020 4.30  3.50 - 5.20 g/dL Final   • ALT (SGPT) 01/16/2020 33  1 - 41 U/L Final   • AST (SGOT) 01/16/2020 21  1 - 40 U/L Final   • Alkaline Phosphatase 01/16/2020 53  39 - 117 U/L Final   • Total Bilirubin 01/16/2020 0.4  0.2 - 1.2 mg/dL Final   • eGFR Non African Amer 01/16/2020 72  >60 mL/min/1.73 Final   • Globulin 01/16/2020 2.9  gm/dL Final   • A/G Ratio 01/16/2020 1.5  g/dL Final   • BUN/Creatinine Ratio 01/16/2020 16.7  7.0 - 25.0 Final   • Anion Gap 01/16/2020 14.7  5.0 - 15.0 mmol/L Final       Physical Exam   Constitutional: He is oriented to person, place, and time. He appears well-developed and well-nourished.   HENT:   Head: Normocephalic and atraumatic.   Nose: Nose normal.   Mouth/Throat: Oropharynx is clear and moist.   Eyes: Pupils are equal, round, and reactive to light. Conjunctivae and EOM are normal.   Neck: Normal range of motion. Neck supple. No tracheal deviation present. No thyromegaly present.   Cardiovascular: Normal rate, regular rhythm, normal heart sounds and intact distal pulses.   No murmur heard.  Pulmonary/Chest: Effort normal and breath sounds normal. No respiratory distress. He has no wheezes.   Abdominal: Soft. Bowel sounds are normal. There is no tenderness. There is no guarding.   Musculoskeletal: Normal range of motion. He exhibits no edema or tenderness.   Lymphadenopathy:     He has no  cervical adenopathy.   Neurological: He is alert and oriented to person, place, and time.   Skin: Skin is warm and dry. No rash noted.   Psychiatric: He has a normal mood and affect. His behavior is normal.   Nursing note and vitals reviewed.      Assessment/Plan     Diagnoses and all orders for this visit:    Colon cancer screening  -     Cologuard - Stool, Per Rectum; Future    SCOTT (generalized anxiety disorder)  Comments:  discontinue zoloft due to increased appetite  Continue Prozac  Orders:  -     FLUoxetine (PROzac) 40 MG capsule; Take 1 capsule by mouth Daily.    Essential hypertension  Comments:  Continue Hyzaar  Continue to monitor  Orders:  -     losartan-hydrochlorothiazide (HYZAAR) 100-25 MG per tablet; Take 1 tablet by mouth Every Morning.  -     Comprehensive Metabolic Panel; Future  -     Lipid Panel; Future  -     Hemoglobin A1c; Future    Mixed hyperlipidemia  Comments:  He was advised to follow a low-cholesterol diet  Continue Crestor  Orders:  -     rosuvastatin (CRESTOR) 10 MG tablet; Take 1 tablet by mouth Daily.  -     Lipid Panel; Future  -     Hemoglobin A1c; Future    Other orders  -     potassium chloride (K-DUR) 10 MEQ CR tablet; Take 1 tablet by mouth Daily.            This document has been electronically signed by:  Arianna Kim PA-C

## 2020-07-27 NOTE — PATIENT INSTRUCTIONS

## 2020-09-28 DIAGNOSIS — J01.00 ACUTE SUPPURATIVE INFLAMMATION OF MAXILLARY SINUS: Primary | ICD-10-CM

## 2020-09-28 DIAGNOSIS — J01.00 ACUTE NON-RECURRENT MAXILLARY SINUSITIS: ICD-10-CM

## 2020-09-28 RX ORDER — LEVOFLOXACIN 500 MG/1
500 TABLET, FILM COATED ORAL DAILY
Qty: 10 TABLET | Refills: 0 | Status: SHIPPED | OUTPATIENT
Start: 2020-09-28 | End: 2020-10-08

## 2020-10-23 ENCOUNTER — TELEMEDICINE (OUTPATIENT)
Dept: FAMILY MEDICINE CLINIC | Facility: CLINIC | Age: 50
End: 2020-10-23

## 2020-10-23 ENCOUNTER — LAB (OUTPATIENT)
Dept: FAMILY MEDICINE CLINIC | Facility: CLINIC | Age: 50
End: 2020-10-23

## 2020-10-23 DIAGNOSIS — R05.9 COUGH: ICD-10-CM

## 2020-10-23 DIAGNOSIS — J01.00 ACUTE NON-RECURRENT MAXILLARY SINUSITIS: Primary | ICD-10-CM

## 2020-10-23 PROCEDURE — U0004 COV-19 TEST NON-CDC HGH THRU: HCPCS | Performed by: NURSE PRACTITIONER

## 2020-10-23 PROCEDURE — 99442 PR PHYS/QHP TELEPHONE EVALUATION 11-20 MIN: CPT | Performed by: NURSE PRACTITIONER

## 2020-10-23 RX ORDER — DEXTROMETHORPHAN HYDROBROMIDE AND PROMETHAZINE HYDROCHLORIDE 15; 6.25 MG/5ML; MG/5ML
5 SYRUP ORAL NIGHTLY PRN
Qty: 120 ML | Refills: 1 | Status: SHIPPED | OUTPATIENT
Start: 2020-10-23 | End: 2021-12-27

## 2020-10-23 RX ORDER — BENZONATATE 200 MG/1
200 CAPSULE ORAL 3 TIMES DAILY PRN
Qty: 20 CAPSULE | Refills: 1 | Status: SHIPPED | OUTPATIENT
Start: 2020-10-23 | End: 2021-12-27

## 2020-10-23 RX ORDER — DOXYCYCLINE 100 MG/1
100 CAPSULE ORAL EVERY 12 HOURS SCHEDULED
Qty: 20 CAPSULE | Refills: 0 | Status: SHIPPED | OUTPATIENT
Start: 2020-10-23 | End: 2021-12-27

## 2020-10-23 NOTE — PROGRESS NOTES
You have chosen to receive care through a telephone/audio visit. Do you consent to use a telephone visit for your medical care today? Yes  Kwadwo Franks is a 50 y.o. male who is c/o upper respiratory symptoms which started two days ago. He does have episodes of Allergic Rhinitis which he uses Zyrtec and Flonase Nasal Spray. He has been taking Zyrtec but not the nasal spray.      URI   This is a new problem. The current episode started in the past 7 days. The problem has been waxing and waning. There has been no fever. Associated symptoms include congestion, coughing, headaches, rhinorrhea, sinus pain and a sore throat (Contributes to the drainage). Pertinent negatives include no chest pain, nausea, rash, vomiting or wheezing. Ear pain: Ear pressure. He has tried antihistamine for the symptoms. The treatment provided mild relief.      The following portions of the patient's history were reviewed and updated as appropriate: allergies, current medications, past family history, past medical history, past social history, past surgical history and problem list.    Current Outpatient Medications:   •  benzonatate (TESSALON) 200 MG capsule, Take 1 capsule by mouth 3 (Three) Times a Day As Needed for Cough., Disp: 20 capsule, Rfl: 1  •  cetirizine (zyrTEC) 10 MG tablet, Take 10 mg by mouth Daily., Disp: , Rfl:   •  doxycycline (MONODOX) 100 MG capsule, Take 1 capsule by mouth Every 12 (Twelve) Hours., Disp: 20 capsule, Rfl: 0  •  FLUoxetine (PROzac) 40 MG capsule, Take 1 capsule by mouth Daily., Disp: 30 capsule, Rfl: 5  •  fluticasone (FLONASE) 50 MCG/ACT nasal spray, 2 sprays into the nostril(s) as directed by provider Daily., Disp: 16 g, Rfl: 5  •  losartan-hydrochlorothiazide (HYZAAR) 100-25 MG per tablet, Take 1 tablet by mouth Every Morning., Disp: 30 tablet, Rfl: 5  •  mirtazapine (REMERON) 15 MG tablet, Take 0.5 tablets by mouth Every Night., Disp: 15 tablet, Rfl: 5  •  potassium chloride (K-DUR) 10 MEQ CR tablet, Take  1 tablet by mouth Daily., Disp: 30 tablet, Rfl: 5  •  promethazine-dextromethorphan (PROMETHAZINE-DM) 6.25-15 MG/5ML syrup, Take 5 mL by mouth At Night As Needed for Cough., Disp: 120 mL, Rfl: 1  •  rosuvastatin (CRESTOR) 10 MG tablet, Take 1 tablet by mouth Daily., Disp: 30 tablet, Rfl: 5    Allergies   Allergen Reactions   • Lipitor [Atorvastatin Calcium] Myalgia       Review of Systems   Constitutional: Negative for activity change, appetite change, chills, diaphoresis, fatigue and fever.   HENT: Positive for congestion, postnasal drip, rhinorrhea, sinus pressure (R>L), sinus pain and sore throat (Contributes to the drainage). Negative for facial swelling, hearing loss, tinnitus and trouble swallowing. Ear pain: Ear pressure.         Sense of taste very little he has noticed   Eyes: Negative for pain, discharge, redness and itching.   Respiratory: Positive for cough. Negative for shortness of breath and wheezing.    Cardiovascular: Negative for chest pain, palpitations and leg swelling.   Gastrointestinal: Negative for nausea and vomiting.   Musculoskeletal: Negative for myalgias.   Skin: Negative for color change and rash.   Neurological: Positive for headaches. Negative for dizziness and light-headedness.   Hematological: Negative for adenopathy.   Psychiatric/Behavioral: Positive for sleep disturbance (Cough).     There were no vitals taken for this visit.    Physical Exam  Constitutional:       General: He is not in acute distress.     Comments: Pleasant; voice is hoarse   Neurological:      Mental Status: He is alert.   Psychiatric:         Mood and Affect: Affect normal. Mood is anxious.         Speech: Speech normal.         Behavior: Behavior is cooperative.         Cognition and Memory: Cognition normal.         Judgment: Judgment normal.       Assessment/Plan   Diagnoses and all orders for this visit:    1. Acute non-recurrent maxillary sinusitis (Primary)  -     doxycycline (MONODOX) 100 MG capsule;  Take 1 capsule by mouth Every 12 (Twelve) Hours.  Dispense: 20 capsule; Refill: 0    2. Cough  -     promethazine-dextromethorphan (PROMETHAZINE-DM) 6.25-15 MG/5ML syrup; Take 5 mL by mouth At Night As Needed for Cough.  Dispense: 120 mL; Refill: 1  -     benzonatate (TESSALON) 200 MG capsule; Take 1 capsule by mouth 3 (Three) Times a Day As Needed for Cough.  Dispense: 20 capsule; Refill: 1  -     COVID-19,[facility], NP SWAB IN [employer] VIRAL TRANSPORT MEDIA 24-30 HR TAT - Swab, Nasopharynx    Symptoms discussed with Kwadwo. Treatment options reviewed At this time, he reports he generally needs to start an antibiotic due to concern worsening. Will start him on Doxycycline. Also, Promethazine-DM for night time relief and Tessalon Perles for daytime relief. Counseled regarding supportive care measures. Will order COVID testing due to his symptoms. In addition, he lives in the [place] area which has had a high prevalence of positive COVID cases. His wife also has chronic health issues.  He will be notified when the results are available. He understands he is to Quarantine until the results are available.   This was an audio and video enabled telemedicine encounter. Our audio connection was disconnected and the visit had to be continued just with phone connection.   This visit has been scheduled as a phone visit to comply with patient safety concerns in accordance with CDC recommendations. Total time of discussion was 15 minutes.               This document has been electronically signed by TIMI Elam, MAYDA MARIN  October 23, 2020 10:23 EDT

## 2020-10-24 LAB — SARS-COV-2 RNA RESP QL NAA+PROBE: NOT DETECTED

## 2021-01-26 DIAGNOSIS — J30.1 ALLERGIC RHINITIS DUE TO POLLEN: ICD-10-CM

## 2021-01-26 RX ORDER — FLUTICASONE PROPIONATE 50 MCG
SPRAY, SUSPENSION (ML) NASAL
Qty: 16 G | Refills: 5 | Status: SHIPPED | OUTPATIENT
Start: 2021-01-26 | End: 2021-03-02

## 2021-03-01 DIAGNOSIS — E78.2 MIXED HYPERLIPIDEMIA: ICD-10-CM

## 2021-03-01 DIAGNOSIS — J30.1 ALLERGIC RHINITIS DUE TO POLLEN: ICD-10-CM

## 2021-03-02 RX ORDER — ROSUVASTATIN CALCIUM 10 MG/1
10 TABLET, COATED ORAL DAILY
Qty: 30 TABLET | Refills: 5 | Status: SHIPPED | OUTPATIENT
Start: 2021-03-02 | End: 2021-09-28

## 2021-03-02 RX ORDER — FLUTICASONE PROPIONATE 50 MCG
SPRAY, SUSPENSION (ML) NASAL
Qty: 16 G | Refills: 0 | Status: SHIPPED | OUTPATIENT
Start: 2021-03-02 | End: 2022-10-06

## 2021-03-18 ENCOUNTER — BULK ORDERING (OUTPATIENT)
Dept: CASE MANAGEMENT | Facility: OTHER | Age: 51
End: 2021-03-18

## 2021-03-18 DIAGNOSIS — Z23 IMMUNIZATION DUE: ICD-10-CM

## 2021-05-07 DIAGNOSIS — F41.1 GAD (GENERALIZED ANXIETY DISORDER): ICD-10-CM

## 2021-05-08 RX ORDER — FLUOXETINE HYDROCHLORIDE 40 MG/1
CAPSULE ORAL
Qty: 30 CAPSULE | Refills: 5 | Status: SHIPPED | OUTPATIENT
Start: 2021-05-08 | End: 2022-01-20

## 2021-07-21 DIAGNOSIS — I10 ESSENTIAL HYPERTENSION: ICD-10-CM

## 2021-07-22 RX ORDER — LOSARTAN POTASSIUM AND HYDROCHLOROTHIAZIDE 25; 100 MG/1; MG/1
1 TABLET ORAL EVERY MORNING
Qty: 30 TABLET | Refills: 5 | Status: SHIPPED | OUTPATIENT
Start: 2021-07-22 | End: 2021-12-27 | Stop reason: SDUPTHER

## 2021-09-24 DIAGNOSIS — E78.2 MIXED HYPERLIPIDEMIA: ICD-10-CM

## 2021-09-28 RX ORDER — TRIAMCINOLONE ACETONIDE 5 MG/G
1 OINTMENT TOPICAL 3 TIMES DAILY
Qty: 60 G | Refills: 0 | Status: SHIPPED | OUTPATIENT
Start: 2021-09-28 | End: 2021-12-27

## 2021-09-28 RX ORDER — ROSUVASTATIN CALCIUM 10 MG/1
10 TABLET, COATED ORAL DAILY
Qty: 30 TABLET | Refills: 5 | Status: SHIPPED | OUTPATIENT
Start: 2021-09-28 | End: 2022-03-24

## 2021-09-28 RX ORDER — POTASSIUM CHLORIDE 750 MG/1
TABLET, FILM COATED, EXTENDED RELEASE ORAL
Qty: 30 TABLET | Refills: 5 | Status: SHIPPED | OUTPATIENT
Start: 2021-09-28 | End: 2021-12-27 | Stop reason: SDUPTHER

## 2021-12-27 ENCOUNTER — OFFICE VISIT (OUTPATIENT)
Dept: FAMILY MEDICINE CLINIC | Facility: CLINIC | Age: 51
End: 2021-12-27

## 2021-12-27 VITALS
HEIGHT: 73 IN | HEART RATE: 76 BPM | OXYGEN SATURATION: 99 % | SYSTOLIC BLOOD PRESSURE: 136 MMHG | BODY MASS INDEX: 37.51 KG/M2 | DIASTOLIC BLOOD PRESSURE: 86 MMHG | WEIGHT: 283 LBS | TEMPERATURE: 97.3 F

## 2021-12-27 DIAGNOSIS — E78.2 MIXED HYPERLIPIDEMIA: Chronic | ICD-10-CM

## 2021-12-27 DIAGNOSIS — J30.1 ALLERGIC RHINITIS DUE TO POLLEN: ICD-10-CM

## 2021-12-27 DIAGNOSIS — E78.2 MIXED HYPERLIPIDEMIA: ICD-10-CM

## 2021-12-27 DIAGNOSIS — J34.89 STUFFY AND RUNNY NOSE: ICD-10-CM

## 2021-12-27 DIAGNOSIS — I10 PRIMARY HYPERTENSION: Chronic | ICD-10-CM

## 2021-12-27 DIAGNOSIS — J01.00 ACUTE NON-RECURRENT MAXILLARY SINUSITIS: Primary | ICD-10-CM

## 2021-12-27 DIAGNOSIS — I10 ESSENTIAL HYPERTENSION: ICD-10-CM

## 2021-12-27 DIAGNOSIS — R73.03 PREDIABETES: Chronic | ICD-10-CM

## 2021-12-27 DIAGNOSIS — R05.9 COUGH: ICD-10-CM

## 2021-12-27 DIAGNOSIS — Z23 ENCOUNTER FOR IMMUNIZATION: ICD-10-CM

## 2021-12-27 DIAGNOSIS — F41.1 GAD (GENERALIZED ANXIETY DISORDER): ICD-10-CM

## 2021-12-27 DIAGNOSIS — J02.9 SORE THROAT: ICD-10-CM

## 2021-12-27 DIAGNOSIS — R53.83 FATIGUE, UNSPECIFIED TYPE: ICD-10-CM

## 2021-12-27 LAB
ALBUMIN SERPL-MCNC: 4.5 G/DL (ref 3.5–5.2)
ALBUMIN/GLOB SERPL: 1.5 G/DL
ALP SERPL-CCNC: 63 U/L (ref 39–117)
ALT SERPL W P-5'-P-CCNC: 26 U/L (ref 1–41)
ANION GAP SERPL CALCULATED.3IONS-SCNC: 10.4 MMOL/L (ref 5–15)
AST SERPL-CCNC: 16 U/L (ref 1–40)
B PARAPERT DNA SPEC QL NAA+PROBE: NOT DETECTED
B PERT DNA SPEC QL NAA+PROBE: NOT DETECTED
BILIRUB SERPL-MCNC: 0.4 MG/DL (ref 0–1.2)
BUN SERPL-MCNC: 12 MG/DL (ref 6–20)
BUN/CREAT SERPL: 13.3 (ref 7–25)
C PNEUM DNA NPH QL NAA+NON-PROBE: NOT DETECTED
CALCIUM SPEC-SCNC: 9.6 MG/DL (ref 8.6–10.5)
CHLORIDE SERPL-SCNC: 99 MMOL/L (ref 98–107)
CHOLEST SERPL-MCNC: 191 MG/DL (ref 0–200)
CO2 SERPL-SCNC: 27.6 MMOL/L (ref 22–29)
CREAT SERPL-MCNC: 0.9 MG/DL (ref 0.76–1.27)
FLUAV SUBTYP SPEC NAA+PROBE: NOT DETECTED
FLUBV RNA ISLT QL NAA+PROBE: NOT DETECTED
GFR SERPL CREATININE-BSD FRML MDRD: 89 ML/MIN/1.73
GLOBULIN UR ELPH-MCNC: 3.1 GM/DL
GLUCOSE SERPL-MCNC: 119 MG/DL (ref 65–99)
HADV DNA SPEC NAA+PROBE: NOT DETECTED
HBA1C MFR BLD: 6.75 % (ref 4.8–5.6)
HCOV 229E RNA SPEC QL NAA+PROBE: NOT DETECTED
HCOV HKU1 RNA SPEC QL NAA+PROBE: NOT DETECTED
HCOV NL63 RNA SPEC QL NAA+PROBE: NOT DETECTED
HCOV OC43 RNA SPEC QL NAA+PROBE: NOT DETECTED
HDLC SERPL-MCNC: 42 MG/DL (ref 40–60)
HMPV RNA NPH QL NAA+NON-PROBE: NOT DETECTED
HPIV1 RNA ISLT QL NAA+PROBE: NOT DETECTED
HPIV2 RNA SPEC QL NAA+PROBE: NOT DETECTED
HPIV3 RNA NPH QL NAA+PROBE: NOT DETECTED
HPIV4 P GENE NPH QL NAA+PROBE: NOT DETECTED
LDLC SERPL CALC-MCNC: 127 MG/DL (ref 0–100)
LDLC/HDLC SERPL: 2.96 {RATIO}
M PNEUMO IGG SER IA-ACNC: NOT DETECTED
POTASSIUM SERPL-SCNC: 3.5 MMOL/L (ref 3.5–5.2)
PROT SERPL-MCNC: 7.6 G/DL (ref 6–8.5)
RHINOVIRUS RNA SPEC NAA+PROBE: NOT DETECTED
RSV RNA NPH QL NAA+NON-PROBE: NOT DETECTED
SARS-COV-2 RNA NPH QL NAA+NON-PROBE: NOT DETECTED
SODIUM SERPL-SCNC: 137 MMOL/L (ref 136–145)
TRIGL SERPL-MCNC: 124 MG/DL (ref 0–150)
TSH SERPL DL<=0.05 MIU/L-ACNC: 2.48 UIU/ML (ref 0.27–4.2)
VLDLC SERPL-MCNC: 22 MG/DL (ref 5–40)

## 2021-12-27 PROCEDURE — 80061 LIPID PANEL: CPT | Performed by: PHYSICIAN ASSISTANT

## 2021-12-27 PROCEDURE — 0202U NFCT DS 22 TRGT SARS-COV-2: CPT | Performed by: PHYSICIAN ASSISTANT

## 2021-12-27 PROCEDURE — 90686 IIV4 VACC NO PRSV 0.5 ML IM: CPT | Performed by: PHYSICIAN ASSISTANT

## 2021-12-27 PROCEDURE — 80053 COMPREHEN METABOLIC PANEL: CPT | Performed by: PHYSICIAN ASSISTANT

## 2021-12-27 PROCEDURE — 83036 HEMOGLOBIN GLYCOSYLATED A1C: CPT | Performed by: PHYSICIAN ASSISTANT

## 2021-12-27 PROCEDURE — 84443 ASSAY THYROID STIM HORMONE: CPT | Performed by: PHYSICIAN ASSISTANT

## 2021-12-27 PROCEDURE — 83921 ORGANIC ACID SINGLE QUANT: CPT | Performed by: PHYSICIAN ASSISTANT

## 2021-12-27 PROCEDURE — 99214 OFFICE O/P EST MOD 30 MIN: CPT | Performed by: PHYSICIAN ASSISTANT

## 2021-12-27 PROCEDURE — 90471 IMMUNIZATION ADMIN: CPT | Performed by: PHYSICIAN ASSISTANT

## 2021-12-27 RX ORDER — LOSARTAN POTASSIUM AND HYDROCHLOROTHIAZIDE 25; 100 MG/1; MG/1
1 TABLET ORAL EVERY MORNING
Qty: 90 TABLET | Refills: 3 | Status: SHIPPED | OUTPATIENT
Start: 2021-12-27 | End: 2022-03-24

## 2021-12-27 RX ORDER — LEVOFLOXACIN 500 MG/1
500 TABLET, FILM COATED ORAL DAILY
Qty: 10 TABLET | Refills: 0 | Status: SHIPPED | OUTPATIENT
Start: 2021-12-27 | End: 2022-01-18 | Stop reason: SDUPTHER

## 2021-12-27 RX ORDER — POTASSIUM CHLORIDE 750 MG/1
10 TABLET, FILM COATED, EXTENDED RELEASE ORAL DAILY
Qty: 90 TABLET | Refills: 3 | Status: SHIPPED | OUTPATIENT
Start: 2021-12-27 | End: 2022-10-06 | Stop reason: SDUPTHER

## 2021-12-27 NOTE — PROGRESS NOTES
Injection  Injection performed in left deltoid by Lucy Valencia MA. Patient tolerated the procedure well without complications.  12/27/21   Lucy Valencia MA

## 2021-12-27 NOTE — PROGRESS NOTES
"Subjective   Kwadwo Franks is a 51 y.o. male.       Chief Complaint -facial pressure    History of Present Illness -    ROS    Facial pressure-  Patient complains of bilateral maxillary facial/sinus pressure with associated nasal congestion, sore throat, and dry cough for the past 5 days.    Hyperlipidemia-stable with rosuvastatin and diet    Hypertension-stable with Hyzaar    Prediabetes-currently stable with diet    Anxiety-controlled with Zoloft    The following portions of the patient's history were reviewed and updated as appropriate: allergies, current medications, past family history, past medical history, past social history, past surgical history and problem list.    Review of Systems    Objective  Vital signs:  /86   Pulse 76   Temp 97.3 °F (36.3 °C) (Temporal)   Ht 185.4 cm (73\")   Wt 128 kg (283 lb)   SpO2 99%   BMI 37.34 kg/m²     Physical Exam  Vitals and nursing note reviewed.   Constitutional:       General: He is not in acute distress.     Appearance: Normal appearance. He is well-developed. He is not diaphoretic.   HENT:      Head: Normocephalic and atraumatic.      Right Ear: Tympanic membrane, ear canal and external ear normal.      Left Ear: Ear canal and external ear normal.      Ears:      Comments: Left TM erythematous without bulging     Nose: Congestion present.      Comments: Tenderness noted with pressure over bilateral maxillary sinus areas     Mouth/Throat:      Mouth: Mucous membranes are moist.      Pharynx: Posterior oropharyngeal erythema present. No oropharyngeal exudate.   Neck:      Thyroid: No thyromegaly.   Cardiovascular:      Rate and Rhythm: Normal rate and regular rhythm.      Heart sounds: Normal heart sounds. No murmur heard.      Pulmonary:      Effort: Pulmonary effort is normal.      Breath sounds: Normal breath sounds. No wheezing or rales.   Musculoskeletal:      Cervical back: Normal range of motion and neck supple.   Lymphadenopathy:      Cervical: No " cervical adenopathy.   Skin:     General: Skin is warm and dry.      Findings: No rash.   Neurological:      Mental Status: He is alert and oriented to person, place, and time.   Psychiatric:         Mood and Affect: Mood normal.         Behavior: Behavior normal.         Thought Content: Thought content normal.         The following data was reviewed by: TUAN Chris on 12/27/2021:      Lab Results   Component Value Date    GLUCOSE 117 (H) 01/16/2020    BUN 18 01/16/2020    CREATININE 1.08 01/16/2020    EGFRIFNONA 72 01/16/2020    BCR 16.7 01/16/2020    K 4.0 01/16/2020    CO2 27.3 01/16/2020    CALCIUM 9.4 01/16/2020    ALBUMIN 4.30 01/16/2020    LABIL2 1.5 10/07/2014    AST 21 01/16/2020    ALT 33 01/16/2020     Lab Results   Component Value Date    HGBA1C 6.23 (H) 01/09/2020     Lab Results   Component Value Date    CHOL 159 01/09/2020    CHOL 152 07/08/2019    CHOL 259 (H) 02/18/2019    CHLPL 184 10/07/2014     Lab Results   Component Value Date    TRIG 101 01/09/2020    TRIG 161 (H) 07/08/2019    TRIG 186 (H) 02/18/2019     Lab Results   Component Value Date    HDL 37 (L) 01/09/2020    HDL 36 (L) 07/08/2019    HDL 42 (L) 02/18/2019     Lab Results   Component Value Date     (H) 01/09/2020    LDL 84 07/08/2019     (H) 02/18/2019                        Assessment/Plan     Diagnoses and all orders for this visit:    1. Acute non-recurrent maxillary sinusitis (Primary)  Comments:  Symptomatic care advised including warm compresses  Start Levaquin  Respiratory panel performed today  Orders:  -     levoFLOXacin (Levaquin) 500 MG tablet; Take 1 tablet by mouth Daily.  Dispense: 10 tablet; Refill: 0    2. Encounter for immunization  -     FluLaval/Fluarix/Fluzone >6 Months (0916-1670)    3. Cough  -     Respiratory Panel PCR w/COVID-19(SARS-CoV-2) [facility] In-House, NP Swab in UTM/VTM, 3-4 HR TAT - Swab, Nasopharynx    4. Stuffy and runny nose  -     Respiratory Panel PCR  w/COVID-19(SARS-CoV-2) [facility] In-House, NP Swab in UTM/VTM, 3-4 HR TAT - Swab, Nasopharynx    5. Sore throat  -     Respiratory Panel PCR w/COVID-19(SARS-CoV-2) [facility] In-House, NP Swab in UTM/VTM, 3-4 HR TAT - Swab, Nasopharynx    6. Mixed hyperlipidemia  Comments:  Continue rosuvastatin  Advised low-cholesterol diet  Orders:  -     Comprehensive Metabolic Panel  -     Lipid Panel    7. Primary hypertension  Comments:  Continue Hyzaar    Orders:  -     Comprehensive Metabolic Panel  -     Hemoglobin A1c  -     TSH    8. Prediabetes  Comments:  Advise low carbohydrate diabetic diet and weight loss  Orders:  -     Comprehensive Metabolic Panel  -     Hemoglobin A1c    9. Fatigue, unspecified type  -     Methylmalonic Acid, Serum    10. SCOTT (generalized anxiety disorder)  Comments:  discontinue zoloft due to increased appetite  Continue Prozac    11. Allergic rhinitis due to pollen    12. Essential hypertension  Comments:  Continue Hyzaar  Continue to monitor  Orders:  -     losartan-hydrochlorothiazide (HYZAAR) 100-25 MG per tablet; Take 1 tablet by mouth Every Morning.  Dispense: 90 tablet; Refill: 3    13. Mixed hyperlipidemia  Comments:  He was advised to follow a low-cholesterol diet  Continue Crestor  Orders:  -     Comprehensive Metabolic Panel  -     Lipid Panel    Other orders  -     potassium chloride 10 MEQ CR tablet; Take 1 tablet by mouth Daily.  Dispense: 90 tablet; Refill: 3            Patient was given instructions and counseling regarding his condition or for health maintenance advice. Please see specific information pulled into the AVS if appropriate      This document has been electronically signed by:  Arianna Kim PA-C

## 2021-12-31 LAB — METHYLMALONATE SERPL-SCNC: 111 NMOL/L (ref 0–378)

## 2022-01-03 ENCOUNTER — DOCUMENTATION (OUTPATIENT)
Dept: FAMILY MEDICINE CLINIC | Facility: CLINIC | Age: 52
End: 2022-01-03

## 2022-01-03 DIAGNOSIS — E78.2 MIXED HYPERLIPIDEMIA: ICD-10-CM

## 2022-01-03 DIAGNOSIS — E11.9 TYPE 2 DIABETES MELLITUS WITHOUT COMPLICATION, WITHOUT LONG-TERM CURRENT USE OF INSULIN: Primary | ICD-10-CM

## 2022-01-03 PROBLEM — R73.03 PREDIABETES: Status: RESOLVED | Noted: 2018-04-19 | Resolved: 2022-01-03

## 2022-01-03 NOTE — PROGRESS NOTES
1/3/2022 at 4:35 PM  I called and spoke with the patient with regards to his recent lab work.  We discussed his new diagnosis of diabetes mellitus since his A1c is now 6.75.  We also discussed his other lab work including elevated LDL at 127.  Patient was advised to go on a low carbohydrate, low cholesterol diet.  Plan to recheck lab work in 6 months after trial of diet.  Shared decision-making was used in this plan and patient verbalizes understanding.

## 2022-01-18 ENCOUNTER — TELEMEDICINE (OUTPATIENT)
Dept: FAMILY MEDICINE CLINIC | Facility: CLINIC | Age: 52
End: 2022-01-18

## 2022-01-18 VITALS — HEIGHT: 73 IN | BODY MASS INDEX: 37.51 KG/M2 | WEIGHT: 283 LBS

## 2022-01-18 DIAGNOSIS — J40 BRONCHITIS: ICD-10-CM

## 2022-01-18 DIAGNOSIS — R05.9 COUGH: ICD-10-CM

## 2022-01-18 DIAGNOSIS — J34.89 STUFFY AND RUNNY NOSE: ICD-10-CM

## 2022-01-18 DIAGNOSIS — E78.2 MIXED HYPERLIPIDEMIA: Chronic | ICD-10-CM

## 2022-01-18 DIAGNOSIS — J01.00 ACUTE NON-RECURRENT MAXILLARY SINUSITIS: Primary | ICD-10-CM

## 2022-01-18 DIAGNOSIS — F41.1 GAD (GENERALIZED ANXIETY DISORDER): Chronic | ICD-10-CM

## 2022-01-18 DIAGNOSIS — E11.9 TYPE 2 DIABETES MELLITUS WITHOUT COMPLICATION, WITHOUT LONG-TERM CURRENT USE OF INSULIN: Chronic | ICD-10-CM

## 2022-01-18 DIAGNOSIS — J02.9 SORE THROAT: ICD-10-CM

## 2022-01-18 LAB
B PARAPERT DNA SPEC QL NAA+PROBE: NOT DETECTED
B PERT DNA SPEC QL NAA+PROBE: NOT DETECTED
C PNEUM DNA NPH QL NAA+NON-PROBE: NOT DETECTED
FLUAV SUBTYP SPEC NAA+PROBE: NOT DETECTED
FLUBV RNA ISLT QL NAA+PROBE: NOT DETECTED
HADV DNA SPEC NAA+PROBE: NOT DETECTED
HCOV 229E RNA SPEC QL NAA+PROBE: NOT DETECTED
HCOV HKU1 RNA SPEC QL NAA+PROBE: NOT DETECTED
HCOV NL63 RNA SPEC QL NAA+PROBE: NOT DETECTED
HCOV OC43 RNA SPEC QL NAA+PROBE: NOT DETECTED
HMPV RNA NPH QL NAA+NON-PROBE: NOT DETECTED
HPIV1 RNA ISLT QL NAA+PROBE: NOT DETECTED
HPIV2 RNA SPEC QL NAA+PROBE: NOT DETECTED
HPIV3 RNA NPH QL NAA+PROBE: NOT DETECTED
HPIV4 P GENE NPH QL NAA+PROBE: NOT DETECTED
M PNEUMO IGG SER IA-ACNC: NOT DETECTED
RHINOVIRUS RNA SPEC NAA+PROBE: NOT DETECTED
RSV RNA NPH QL NAA+NON-PROBE: NOT DETECTED
SARS-COV-2 RNA NPH QL NAA+NON-PROBE: DETECTED

## 2022-01-18 PROCEDURE — 0202U NFCT DS 22 TRGT SARS-COV-2: CPT | Performed by: PHYSICIAN ASSISTANT

## 2022-01-18 PROCEDURE — 99214 OFFICE O/P EST MOD 30 MIN: CPT | Performed by: PHYSICIAN ASSISTANT

## 2022-01-18 RX ORDER — ALBUTEROL SULFATE 90 UG/1
2 AEROSOL, METERED RESPIRATORY (INHALATION) EVERY 4 HOURS PRN
Qty: 18 G | Refills: 0 | Status: SHIPPED | OUTPATIENT
Start: 2022-01-18 | End: 2022-10-06

## 2022-01-18 RX ORDER — DEXTROMETHORPHAN HYDROBROMIDE AND PROMETHAZINE HYDROCHLORIDE 15; 6.25 MG/5ML; MG/5ML
5 SYRUP ORAL 4 TIMES DAILY PRN
Qty: 180 ML | Refills: 1 | Status: SHIPPED | OUTPATIENT
Start: 2022-01-18 | End: 2022-10-06

## 2022-01-18 RX ORDER — LEVOFLOXACIN 500 MG/1
500 TABLET, FILM COATED ORAL DAILY
Qty: 10 TABLET | Refills: 0 | Status: SHIPPED | OUTPATIENT
Start: 2022-01-18 | End: 2022-10-06

## 2022-01-18 NOTE — PROGRESS NOTES
"Subjective   Kwadwo Franks is a 51 y.o. male.       Chief Complaint -congestion    History of Present Illness -    ROS    Congestion-  Patient complains of nasal and chest congestion with thick green nasal discharge, fever, chills sore throat and postnasal drainage with a dry cough for the past 5 days.  Some relief with 4 days of an old prescription of Levaquin.    Anxiety-  Stable with Prozac.  He denies any hallucinations, SI or HI.    Diabetes mellitus type 2-stable with diet    Hyperlipidemia-stable with rosuvastatin    The following portions of the patient's history were reviewed and updated as appropriate: allergies, current medications, past family history, past medical history, past social history, past surgical history and problem list.    Review of Systems    Objective  Vital signs:  Ht 185.4 cm (73\")   Wt 128 kg (283 lb)   BMI 37.34 kg/m²     Physical Exam  Vitals and nursing note reviewed.   Constitutional:       General: He is not in acute distress.     Appearance: Normal appearance. He is well-developed. He is not diaphoretic.   HENT:      Head: Normocephalic and atraumatic.      Right Ear: Tympanic membrane, ear canal and external ear normal.      Left Ear: Tympanic membrane, ear canal and external ear normal.      Nose: Congestion present.      Mouth/Throat:      Mouth: Mucous membranes are moist.      Pharynx: Posterior oropharyngeal erythema present. No oropharyngeal exudate.   Eyes:      Extraocular Movements: Extraocular movements intact.      Conjunctiva/sclera: Conjunctivae normal.   Neck:      Thyroid: No thyromegaly.   Cardiovascular:      Rate and Rhythm: Regular rhythm.      Heart sounds: Normal heart sounds. No murmur heard.      Pulmonary:      Effort: Pulmonary effort is normal. No respiratory distress.      Breath sounds: Normal breath sounds. No wheezing or rales.   Musculoskeletal:         General: No tenderness.      Cervical back: Normal range of motion and neck supple. "   Lymphadenopathy:      Cervical: No cervical adenopathy.   Skin:     General: Skin is warm and dry.      Findings: No rash.   Neurological:      Mental Status: He is alert and oriented to person, place, and time.   Psychiatric:         Mood and Affect: Mood normal.         Behavior: Behavior normal.         Thought Content: Thought content normal.         The following data was reviewed by: TUAN Chris on 01/18/2022:  CMP    CMP 12/27/21   Glucose 119 (A)   BUN 12   Creatinine 0.90   eGFR Non African Am 89   Sodium 137   Potassium 3.5   Chloride 99   Calcium 9.6   Albumin 4.50   Total Bilirubin 0.4   Alkaline Phosphatase 63   AST (SGOT) 16   ALT (SGPT) 26   (A) Abnormal value              Lipid Panel    Lipid Panel 12/27/21   Total Cholesterol 191   Triglycerides 124   HDL Cholesterol 42   VLDL Cholesterol 22   LDL Cholesterol  127 (A)   LDL/HDL Ratio 2.96   (A) Abnormal value            TSH    TSH 12/27/21   TSH 2.480           Most Recent A1C    HGBA1C Most Recent 12/27/21   Hemoglobin A1C 6.75 (A)   (A) Abnormal value                   Assessment/Plan     Diagnoses and all orders for this visit:    1. Acute non-recurrent maxillary sinusitis (Primary)  Comments:  Symptomatic care advised including warm compresses  Prescription written so patient could complete 10 days Levaquin treatment  Respiratory panel performed today    2. Cough  -     Respiratory Panel PCR w/COVID-19(SARS-CoV-2) [facility], NP Swab in UTM/VTM, 3-4 HR TAT - Swab, Nasopharynx    3. Stuffy and runny nose  -     Respiratory Panel PCR w/COVID-19(SARS-CoV-2) [facility] In-[facility], NP Swab in UTM/VTM, 3-4 HR TAT - Swab, Nasopharynx    4. Sore throat  -     Respiratory Panel PCR w/COVID-19(SARS-CoV-2) [facility], NP Swab in UTM/VTM, 3-4 HR TAT - Swab, Nasopharynx    5. Bronchitis  -     levoFLOXacin (Levaquin) 500 MG tablet; Take 1 tablet by mouth Daily.  Dispense: 10  tablet; Refill: 0  -     promethazine-dextromethorphan (PROMETHAZINE-DM) 6.25-15 MG/5ML syrup; Take 5 mL by mouth 4 (Four) Times a Day As Needed for Cough.  Dispense: 180 mL; Refill: 1  -     albuterol sulfate  (90 Base) MCG/ACT inhaler; Inhale 2 puffs Every 4 (Four) Hours As Needed for Shortness of Air.  Dispense: 18 g; Refill: 0    6. Type 2 diabetes mellitus without complication, without long-term current use of insulin (HCC)  Comments:  Advise low carbohydrate diabetic diet and weight loss    7. SCOTT (generalized anxiety disorder)  Comments:  Continue Prozac  Advised conservative measures for dealing with anxiety    8. Mixed hyperlipidemia  Comments:  Advised low-cholesterol diet  Continue rosuvastatin            Patient was given instructions and counseling regarding his condition or for health maintenance advice. Please see specific information pulled into the AVS if appropriate      This document has been electronically signed by:  Arianna Kim PA-C

## 2022-01-18 NOTE — PATIENT INSTRUCTIONS
"https://www.diabeteseducator.org/docs/default-source/living-with-diabetes/conquering-the-grocery-store-v1.pdf?sfvrsn=4\">   Carbohydrate Counting for Diabetes Mellitus, Adult  Carbohydrate counting is a method of keeping track of how many carbohydrates you eat. Eating carbohydrates naturally increases the amount of sugar (glucose) in the blood. Counting how many carbohydrates you eat improves your blood glucose control, which helps you manage your diabetes.  It is important to know how many carbohydrates you can safely have in each meal. This is different for every person. A dietitian can help you make a meal plan and calculate how many carbohydrates you should have at each meal and snack.  What foods contain carbohydrates?  Carbohydrates are found in the following foods:  · Grains, such as breads and cereals.  · Dried beans and soy products.  · Starchy vegetables, such as potatoes, peas, and corn.  · Fruit and fruit juices.  · Milk and yogurt.  · Sweets and snack foods, such as cake, cookies, candy, chips, and soft drinks.  How do I count carbohydrates in foods?  There are two ways to count carbohydrates in food. You can read food labels or learn standard serving sizes of foods. You can use either of the methods or a combination of both.  Using the Nutrition Facts label  The Nutrition Facts list is included on the labels of almost all packaged foods and beverages in the [place] It includes:  · The serving size.  · Information about nutrients in each serving, including the grams (g) of carbohydrate per serving.  To use the Nutrition Facts:  · Decide how many servings you will have.  · Multiply the number of servings by the number of carbohydrates per serving.  · The resulting number is the total amount of carbohydrates that you will be having.  Learning the standard serving sizes of foods  When you eat carbohydrate foods that are not packaged or do not include Nutrition Facts on the label, you need to measure the " servings in order to count the amount of carbohydrates.  · Measure the foods that you will eat with a food scale or measuring cup, if needed.  · Decide how many standard-size servings you will eat.  · Multiply the number of servings by 15. For foods that contain carbohydrates, one serving equals 15 g of carbohydrates.  ? For example, if you eat 2 cups or 10 oz (300 g) of strawberries, you will have eaten 2 servings and 30 g of carbohydrates (2 servings x 15 g = 30 g).  · For foods that have more than one food mixed, such as soups and casseroles, you must count the carbohydrates in each food that is included.  The following list contains standard serving sizes of common carbohydrate-rich foods. Each of these servings has about 15 g of carbohydrates:  · 1 slice of bread.  · 1 six-inch (15 cm) tortilla.  · ? cup or 2 oz (53 g) cooked rice or pasta.  · ½ cup or 3 oz (85 g) cooked or canned, drained and rinsed beans or lentils.  · ½ cup or 3 oz (85 g) starchy vegetable, such as peas, corn, or squash.  · ½ cup or 4 oz (120 g) hot cereal.  · ½ cup or 3 oz (85 g) boiled or mashed potatoes, or ¼ or 3 oz (85 g) of a large baked potato.  · ½ cup or 4 fl oz (118 mL) fruit juice.  · 1 cup or 8 fl oz (237 mL) milk.  · 1 small or 4 oz (106 g) apple.  · ½ or 2 oz (63 g) of a medium banana.  · 1 cup or 5 oz (150 g) strawberries.  · 3 cups or 1 oz (24 g) popped popcorn.  What is an example of carbohydrate counting?  To calculate the number of carbohydrates in this sample meal, follow the steps shown below.  Sample meal  · 3 oz (85 g) chicken breast.  · ? cup or 4 oz (106 g) brown rice.  · ½ cup or 3 oz (85 g) corn.  · 1 cup or 8 fl oz (237 mL) milk.  · 1 cup or 5 oz (150 g) strawberries with sugar-free whipped topping.  Carbohydrate calculation  1. Identify the foods that contain carbohydrates:  ? Rice.  ? Corn.  ? Milk.  ? Strawberries.  2. Calculate how many servings you have of each food:  ? 2 servings rice.  ? 1 serving  corn.  ? 1 serving milk.  ? 1 serving strawberries.  3. Multiply each number of servings by 15 g:  ? 2 servings rice x 15 g = 30 g.  ? 1 serving corn x 15 g = 15 g.  ? 1 serving milk x 15 g = 15 g.  ? 1 serving strawberries x 15 g = 15 g.  4. Add together all of the amounts to find the total grams of carbohydrates eaten:  ? 30 g + 15 g + 15 g + 15 g = 75 g of carbohydrates total.  What are tips for following this plan?  Shopping  · Develop a meal plan and then make a shopping list.  · Buy fresh and frozen vegetables, fresh and frozen fruit, dairy, eggs, beans, lentils, and whole grains.  · Look at food labels. Choose foods that have more fiber and less sugar.  · Avoid processed foods and foods with added sugars.  Meal planning  · Aim to have the same amount of carbohydrates at each meal and for each snack time.  · Plan to have regular, balanced meals and snacks.  Where to find more information  · American Diabetes Association: www.diabetes.org  · Centers for Disease Control and Prevention: www.cdc.gov  Summary  · Carbohydrate counting is a method of keeping track of how many carbohydrates you eat.  · Eating carbohydrates naturally increases the amount of sugar (glucose) in the blood.  · Counting how many carbohydrates you eat improves your blood glucose control, which helps you manage your diabetes.  · A dietitian can help you make a meal plan and calculate how many carbohydrates you should have at each meal and snack.  This information is not intended to replace advice given to you by your health care provider. Make sure you discuss any questions you have with your health care provider.  Document Revised: 12/17/2020 Document Reviewed: 12/18/2020  [employer] Patient Education © 2021 [employer]      Fat and Cholesterol Restricted Eating Plan  Getting too much fat and cholesterol in your diet may cause health problems. Choosing the right foods helps keep your fat and cholesterol at normal levels. This can keep you  "from getting certain diseases.  Your doctor may recommend an eating plan that includes:  · Total fat: ______% or less of total calories a day.  · Saturated fat: ______% or less of total calories a day.  · Cholesterol: less than _________mg a day.  · Fiber: ______g a day.  What are tips for following this plan?  Meal planning  · At meals, divide your plate into four equal parts:  ? Fill one-half of your plate with vegetables and green salads.  ? Fill one-fourth of your plate with whole grains.  ? Fill one-fourth of your plate with low-fat (lean) protein foods.  · Eat fish that is high in omega-3 fats at least two times a week. This includes mackerel, tuna, sardines, and salmon.  · Eat foods that are high in fiber, such as whole grains, beans, apples, broccoli, carrots, peas, and barley.  General tips    · Work with your doctor to lose weight if you need to.  · Avoid:  ? Foods with added sugar.  ? Fried foods.  ? Foods with partially hydrogenated oils.  · Limit alcohol intake to no more than 1 drink a day for nonpregnant women and 2 drinks a day for men. One drink equals 12 oz of beer, 5 oz of wine, or 1½ oz of hard liquor.    Reading food labels  · Check food labels for:  ? Trans fats.  ? Partially hydrogenated oils.  ? Saturated fat (g) in each serving.  ? Cholesterol (mg) in each serving.  ? Fiber (g) in each serving.  · Choose foods with healthy fats, such as:  ? Monounsaturated fats.  ? Polyunsaturated fats.  ? Omega-3 fats.  · Choose grain products that have whole grains. Look for the word \"whole\" as the first word in the ingredient list.  Cooking  · Cook foods using low-fat methods. These include baking, boiling, grilling, and broiling.  · Eat more home-cooked foods. Eat at restaurants and buffets less often.  · Avoid cooking using saturated fats, such as butter, cream, palm oil, palm kernel oil, and coconut oil.  Recommended foods    Fruits  · All fresh, canned (in natural juice), or frozen " fruits.  Vegetables  · Fresh or frozen vegetables (raw, steamed, roasted, or grilled). Green salads.  Grains  · Whole grains, such as whole wheat or whole grain breads, crackers, cereals, and pasta. Unsweetened oatmeal, bulgur, barley, quinoa, or brown rice. Corn or whole wheat flour tortillas.  Meats and other protein foods  · Ground beef (85% or leaner), grass-fed beef, or beef trimmed of fat. Skinless chicken or turkey. Ground chicken or turkey. Pork trimmed of fat. All fish and seafood. Egg whites. Dried beans, peas, or lentils. Unsalted nuts or seeds. Unsalted canned beans. Nut butters without added sugar or oil.  Dairy  · Low-fat or nonfat dairy products, such as skim or 1% milk, 2% or reduced-fat cheeses, low-fat and fat-free ricotta or cottage cheese, or plain low-fat and nonfat yogurt.  Fats and oils  · Tub margarine without trans fats. Light or reduced-fat mayonnaise and salad dressings. Avocado. Olive, canola, sesame, or safflower oils.  The items listed above may not be a complete list of foods and beverages you can eat. Contact a dietitian for more information.  Foods to avoid  Fruits  · Canned fruit in heavy syrup. Fruit in cream or butter sauce. Fried fruit.  Vegetables  · Vegetables cooked in cheese, cream, or butter sauce. Fried vegetables.  Grains  · White bread. White pasta. White rice. Cornbread. Bagels, pastries, and croissants. Crackers and snack foods that contain trans fat and hydrogenated oils.  Meats and other protein foods  · Fatty cuts of meat. Ribs, chicken wings, wiseman, sausage, bologna, salami, chitterlings, fatback, hot dogs, bratwurst, and packaged lunch meats. Liver and organ meats. Whole eggs and egg yolks. Chicken and turkey with skin. Fried meat.  Dairy  · Whole or 2% milk, cream, half-and-half, and cream cheese. Whole milk cheeses. Whole-fat or sweetened yogurt. Full-fat cheeses. Nondairy creamers and whipped toppings. Processed cheese, cheese spreads, and cheese  curds.  Beverages  · Alcohol. Sugar-sweetened drinks such as sodas, lemonade, and fruit drinks.  Fats and oils  · Butter, stick margarine, lard, shortening, ghee, or wiseman fat. Coconut, palm kernel, and palm oils.  Sweets and desserts  · Corn syrup, sugars, honey, and molasses. Candy. Jam and jelly. Syrup. Sweetened cereals. Cookies, pies, cakes, donuts, muffins, and ice cream.  The items listed above may not be a complete list of foods and beverages you should avoid. Contact a dietitian for more information.  Summary  · Choosing the right foods helps keep your fat and cholesterol at normal levels. This can keep you from getting certain diseases.  · At meals, fill one-half of your plate with vegetables and green salads.  · Eat high-fiber foods, like whole grains, beans, apples, carrots, peas, and barley.  · Limit added sugar, saturated fats, alcohol, and fried foods.  This information is not intended to replace advice given to you by your health care provider. Make sure you discuss any questions you have with your health care provider.  Document Revised: 04/21/2021 Document Reviewed: 04/21/2021  Elsevier Patient Education © 2021 [employer]

## 2022-01-19 DIAGNOSIS — F41.1 GAD (GENERALIZED ANXIETY DISORDER): ICD-10-CM

## 2022-01-20 RX ORDER — FLUOXETINE HYDROCHLORIDE 40 MG/1
CAPSULE ORAL
Qty: 30 CAPSULE | Refills: 5 | Status: SHIPPED | OUTPATIENT
Start: 2022-01-20 | End: 2022-10-06 | Stop reason: SDUPTHER

## 2022-03-23 DIAGNOSIS — E78.2 MIXED HYPERLIPIDEMIA: ICD-10-CM

## 2022-03-23 DIAGNOSIS — I10 ESSENTIAL HYPERTENSION: ICD-10-CM

## 2022-03-24 RX ORDER — ROSUVASTATIN CALCIUM 10 MG/1
TABLET, COATED ORAL
Qty: 30 TABLET | Refills: 5 | Status: SHIPPED | OUTPATIENT
Start: 2022-03-24 | End: 2022-10-06

## 2022-03-24 RX ORDER — LOSARTAN POTASSIUM AND HYDROCHLOROTHIAZIDE 25; 100 MG/1; MG/1
1 TABLET ORAL EVERY MORNING
Qty: 30 TABLET | Refills: 5 | Status: SHIPPED | OUTPATIENT
Start: 2022-03-24 | End: 2022-10-06 | Stop reason: SDUPTHER

## 2022-09-02 NOTE — PROGRESS NOTES
Problem: Potential for Falls  Goal: Patient will remain free of falls  Description: INTERVENTIONS:  - Educate patient/family on patient safety including physical limitations  - Instruct patient to call for assistance with activity   - Consult OT/PT to assist with strengthening/mobility   - Keep Call bell within reach  - Keep bed low and locked with side rails adjusted as appropriate  - Keep care items and personal belongings within reach  - Initiate and maintain comfort rounds  - Make Fall Risk Sign visible to staff  - Apply yellow socks and bracelet for high fall risk patients  - Consider moving patient to room near nurses station  Outcome: Progressing     Problem: PAIN - ADULT  Goal: Verbalizes/displays adequate comfort level or baseline comfort level  Description: Interventions:  - Encourage patient to monitor pain and request assistance  - Assess pain using appropriate pain scale  - Administer analgesics based on type and severity of pain and evaluate response  - Implement non-pharmacological measures as appropriate and evaluate response  - Consider cultural and social influences on pain and pain management  - Notify physician/advanced practitioner if interventions unsuccessful or patient reports new pain  Outcome: Progressing     Problem: INFECTION - ADULT  Goal: Absence or prevention of progression during hospitalization  Description: INTERVENTIONS:  - Assess and monitor for signs and symptoms of infection  - Monitor lab/diagnostic results  - Monitor all insertion sites, i e  indwelling lines, tubes, and drains  - Monitor endotracheal if appropriate and nasal secretions for changes in amount and color  - [place] appropriate cooling/warming therapies per order  - Administer medications as ordered  - Instruct and encourage patient and family to use good hand hygiene technique  - Identify and instruct in appropriate isolation precautions for identified infection/condition  Outcome: Progressing  Goal: Absence Subjective   Kwadwo Franks is a 49 y.o. male.       Chief Complaint -hyperlipidemia    History of Present Illness -      Hyperlipidemia-  Significantly improved since starting Crestor.  He failed Lipitor due to myalgias.  He denies any side effects or myalgia with the use of Crestor.  Lab Results   Component Value Date    CHOL 152 07/08/2019    CHOL 259 (H) 02/18/2019    CHOL 138 04/19/2018    CHLPL 184 10/07/2014     Lab Results   Component Value Date    TRIG 161 (H) 07/08/2019    TRIG 186 (H) 02/18/2019    TRIG 95 04/19/2018     Lab Results   Component Value Date    HDL 36 (L) 07/08/2019    HDL 42 (L) 02/18/2019    HDL 30 (L) 04/19/2018     Lab Results   Component Value Date    LDL 84 07/08/2019     (H) 02/18/2019    LDL 89 04/19/2018     Hypertension-controlled with Hyzaar    Generalized anxiety disorder-stable with Prozac.  He denies any HI or SI.    The following portions of the patient's history were reviewed and updated as appropriate: allergies, current medications, past family history, past medical history, past social history, past surgical history and problem list.    Review of Systems   Constitutional: Negative for activity change, appetite change and fever.   HENT: Negative for ear pain, sinus pressure and sore throat.    Eyes: Negative for pain and visual disturbance.   Respiratory: Negative for cough and chest tightness.    Cardiovascular: Negative for chest pain and palpitations.   Gastrointestinal: Negative for abdominal pain, constipation, diarrhea, nausea and vomiting.   Endocrine: Negative for polydipsia and polyuria.   Genitourinary: Negative for dysuria and frequency.   Musculoskeletal: Negative for back pain and myalgias.   Skin: Negative for color change and rash.   Allergic/Immunologic: Negative for food allergies and immunocompromised state.   Neurological: Negative for dizziness, syncope and headaches.   Hematological: Negative for adenopathy. Does not bruise/bleed easily.  "  Psychiatric/Behavioral: Negative for hallucinations and suicidal ideas. The patient is not nervous/anxious.        /80   Pulse 74   Temp 98.2 °F (36.8 °C) (Oral)   Ht 185.4 cm (72.99\")   Wt 126 kg (277 lb)   SpO2 98%   BMI 36.55 kg/m²   Lab on 07/08/2019   Component Date Value Ref Range Status   • Testosterone, Total 07/08/2019 420.7  264.0 - 916.0 ng/dL Final   • Testosterone, Free 07/08/2019 9.2  7.2 - 24.0 pg/mL Final   • Total Cholesterol 07/08/2019 152  0 - 200 mg/dL Final   • Triglycerides 07/08/2019 161* 0 - 150 mg/dL Final   • HDL Cholesterol 07/08/2019 36* 40 - 60 mg/dL Final   • LDL Cholesterol  07/08/2019 84  0 - 100 mg/dL Final   • VLDL Cholesterol 07/08/2019 32.2  5 - 40 mg/dL Final   • LDL/HDL Ratio 07/08/2019 2.33   Final   • Glucose 07/08/2019 103* 65 - 99 mg/dL Final   • BUN 07/08/2019 15  6 - 20 mg/dL Final   • Creatinine 07/08/2019 0.95  0.76 - 1.27 mg/dL Final   • Sodium 07/08/2019 142  136 - 145 mmol/L Final   • Potassium 07/08/2019 4.0  3.5 - 5.2 mmol/L Final   • Chloride 07/08/2019 102  98 - 107 mmol/L Final   • CO2 07/08/2019 25.5  22.0 - 29.0 mmol/L Final   • Calcium 07/08/2019 9.1  8.6 - 10.5 mg/dL Final   • Total Protein 07/08/2019 6.8  6.0 - 8.5 g/dL Final   • Albumin 07/08/2019 4.00  3.50 - 5.20 g/dL Final   • ALT (SGPT) 07/08/2019 22  1 - 41 U/L Final   • AST (SGOT) 07/08/2019 17  1 - 40 U/L Final   • Alkaline Phosphatase 07/08/2019 48  39 - 117 U/L Final   • Total Bilirubin 07/08/2019 0.4  0.2 - 1.2 mg/dL Final   • eGFR Non  Amer 07/08/2019 84  >60 mL/min/1.73 Final   • Globulin 07/08/2019 2.8  gm/dL Final   • A/G Ratio 07/08/2019 1.4  g/dL Final   • BUN/Creatinine Ratio 07/08/2019 15.8  7.0 - 25.0 Final   • Anion Gap 07/08/2019 14.5  5.0 - 15.0 mmol/L Final   • Hemoglobin A1C 07/08/2019 6.20* 4.80 - 5.60 % Final   • Testosterone, Total 07/08/2019 378.00  193.00 - 740.00 ng/dL Final       Physical Exam   Constitutional: He is oriented to person, place, and time. He " of fever/infection during neutropenic period  Description: INTERVENTIONS:  - Monitor WBC    Outcome: Progressing     Problem: SAFETY ADULT  Goal: Patient will remain free of falls  Description: INTERVENTIONS:  - Educate patient/family on patient safety including physical limitations  - Instruct patient to call for assistance with activity   - Consult OT/PT to assist with strengthening/mobility   - Keep Call bell within reach  - Keep bed low and locked with side rails adjusted as appropriate  - Keep care items and personal belongings within reach  - Initiate and maintain comfort rounds  - Make Fall Risk Sign visible to staff  - Apply yellow socks and bracelet for high fall risk patients  - Consider moving patient to room near nurses station  Outcome: Progressing  Goal: Maintain or return to baseline ADL function  Description: INTERVENTIONS:  -  Assess patient's ability to carry out ADLs; assess patient's baseline for ADL function and identify physical deficits which impact ability to perform ADLs (bathing, care of mouth/teeth, toileting, grooming, dressing, etc )  - Assess/evaluate cause of self-care deficits   - Assess range of motion  - Assess patient's mobility; develop plan if impaired  - Assess patient's need for assistive devices and provide as appropriate  - Encourage maximum independence but intervene and supervise when necessary  - Involve family in performance of ADLs  - Assess for home care needs following discharge   - Consider OT consult to assist with ADL evaluation and planning for discharge  - Provide patient education as appropriate  Outcome: Progressing  Goal: Maintains/Returns to pre admission functional level  Description: INTERVENTIONS:  - Perform BMAT or MOVE assessment daily    - Set and communicate daily mobility goal to care team and patient/family/caregiver     - Collaborate with rehabilitation services on mobility goals if consulted  - Out of bed for toileting  - Record patient progress and appears well-developed and well-nourished.   HENT:   Head: Normocephalic and atraumatic.   Nose: Nose normal.   Mouth/Throat: Oropharynx is clear and moist.   Eyes: Conjunctivae and EOM are normal. Pupils are equal, round, and reactive to light.   Neck: Normal range of motion. Neck supple. No tracheal deviation present. No thyromegaly present.   Cardiovascular: Normal rate, regular rhythm, normal heart sounds and intact distal pulses.   No murmur heard.  Pulmonary/Chest: Effort normal and breath sounds normal. No respiratory distress. He has no wheezes.   Abdominal: Soft. Bowel sounds are normal. There is no tenderness. There is no guarding.   Musculoskeletal: Normal range of motion. He exhibits no edema or tenderness.   Lymphadenopathy:     He has no cervical adenopathy.   Neurological: He is alert and oriented to person, place, and time.   Skin: Skin is warm and dry. No rash noted.   Psychiatric: He has a normal mood and affect. His behavior is normal.   Nursing note and vitals reviewed.      Assessment/Plan     Diagnoses and all orders for this visit:    Mixed hyperlipidemia  Comments:  He was advised to follow a low-cholesterol diet  Orders:  -     rosuvastatin (CRESTOR) 10 MG tablet; Take 1 tablet by mouth Daily.    Essential hypertension  Comments:  Continue Hyzaar    Anxiety  Comments:  Continue Prozac    Plan to repeat lipids in 6 months with liver enzymes.  Labs reviewed with the patient today             This document has been electronically signed by:  Arianna Kim PA-C   toleration of activity level   Outcome: Progressing     Problem: DISCHARGE PLANNING  Goal: Discharge to home or other facility with appropriate resources  Description: INTERVENTIONS:  - Identify barriers to discharge w/patient and caregiver  - Arrange for needed discharge resources and transportation as appropriate  - Identify discharge learning needs (meds, wound care, etc )  - Arrange for interpretive services to assist at discharge as needed  - Refer to Case Management Department for coordinating discharge planning if the patient needs post-hospital services based on physician/advanced practitioner order or complex needs related to functional status, cognitive ability, or social support system  Outcome: Progressing     Problem: Knowledge Deficit  Goal: Patient/family/caregiver demonstrates understanding of disease process, treatment plan, medications, and discharge instructions  Description: Complete learning assessment and assess knowledge base  Interventions:  - Provide teaching at level of understanding  - Provide teaching via preferred learning methods  Outcome: Progressing     Problem: NEUROSENSORY - ADULT  Goal: Achieves stable or improved neurological status  Description: INTERVENTIONS  - Monitor and report changes in neurological status  - Monitor vital signs such as temperature, blood pressure, glucose, and any other labs ordered   - Initiate measures to prevent increased intracranial pressure  - Monitor for seizure activity and implement precautions if appropriate      Outcome: Progressing     Problem: Nutrition/Hydration-ADULT  Goal: Nutrient/Hydration intake appropriate for improving, restoring or maintaining nutritional needs  Description: Monitor and assess patient's nutrition/hydration status for malnutrition  Collaborate with interdisciplinary team and initiate plan and interventions as ordered  Monitor patient's weight and dietary intake as ordered or per policy   Utilize nutrition screening tool and intervene as necessary  Determine patient's food preferences and provide high-protein, high-caloric foods as appropriate       INTERVENTIONS:  - Monitor oral intake, urinary output, labs, and treatment plans  - Assess nutrition and hydration status and recommend course of action  - Evaluate amount of meals eaten  - Assist patient with eating if necessary   - Allow adequate time for meals  - Recommend/ encourage appropriate diets, oral nutritional supplements, and vitamin/mineral supplements  - Order, calculate, and assess calorie counts as needed  - Recommend, monitor, and adjust tube feedings and TPN/PPN based on assessed needs  - Assess need for intravenous fluids  - Provide specific nutrition/hydration education as appropriate  - Include patient/family/caregiver in decisions related to nutrition  Outcome: Progressing

## 2022-10-04 ENCOUNTER — LAB (OUTPATIENT)
Dept: FAMILY MEDICINE CLINIC | Facility: CLINIC | Age: 52
End: 2022-10-04

## 2022-10-04 DIAGNOSIS — E78.2 MIXED HYPERLIPIDEMIA: ICD-10-CM

## 2022-10-04 DIAGNOSIS — E11.9 TYPE 2 DIABETES MELLITUS WITHOUT COMPLICATION, WITHOUT LONG-TERM CURRENT USE OF INSULIN: ICD-10-CM

## 2022-10-04 LAB
ALBUMIN SERPL-MCNC: 4.3 G/DL (ref 3.5–5.2)
ALBUMIN UR-MCNC: <1.2 MG/DL
ALBUMIN/GLOB SERPL: 1.7 G/DL
ALP SERPL-CCNC: 59 U/L (ref 39–117)
ALT SERPL W P-5'-P-CCNC: 23 U/L (ref 1–41)
ANION GAP SERPL CALCULATED.3IONS-SCNC: 8 MMOL/L (ref 5–15)
AST SERPL-CCNC: 16 U/L (ref 1–40)
BILIRUB SERPL-MCNC: 0.4 MG/DL (ref 0–1.2)
BUN SERPL-MCNC: 15 MG/DL (ref 6–20)
BUN/CREAT SERPL: 16.7 (ref 7–25)
CALCIUM SPEC-SCNC: 9.6 MG/DL (ref 8.6–10.5)
CHLORIDE SERPL-SCNC: 103 MMOL/L (ref 98–107)
CHOLEST SERPL-MCNC: 175 MG/DL (ref 0–200)
CO2 SERPL-SCNC: 30 MMOL/L (ref 22–29)
CREAT SERPL-MCNC: 0.9 MG/DL (ref 0.76–1.27)
EGFRCR SERPLBLD CKD-EPI 2021: 102.1 ML/MIN/1.73
GLOBULIN UR ELPH-MCNC: 2.5 GM/DL
GLUCOSE SERPL-MCNC: 125 MG/DL (ref 65–99)
HBA1C MFR BLD: 6.9 % (ref 4.8–5.6)
HDLC SERPL-MCNC: 33 MG/DL (ref 40–60)
LDLC SERPL CALC-MCNC: 122 MG/DL (ref 0–100)
LDLC/HDLC SERPL: 3.63 {RATIO}
POTASSIUM SERPL-SCNC: 3.7 MMOL/L (ref 3.5–5.2)
PROT SERPL-MCNC: 6.8 G/DL (ref 6–8.5)
SODIUM SERPL-SCNC: 141 MMOL/L (ref 136–145)
TRIGL SERPL-MCNC: 111 MG/DL (ref 0–150)
VLDLC SERPL-MCNC: 20 MG/DL (ref 5–40)

## 2022-10-04 PROCEDURE — 83036 HEMOGLOBIN GLYCOSYLATED A1C: CPT | Performed by: PHYSICIAN ASSISTANT

## 2022-10-04 PROCEDURE — 82043 UR ALBUMIN QUANTITATIVE: CPT | Performed by: PHYSICIAN ASSISTANT

## 2022-10-04 PROCEDURE — 80061 LIPID PANEL: CPT | Performed by: PHYSICIAN ASSISTANT

## 2022-10-04 PROCEDURE — 80053 COMPREHEN METABOLIC PANEL: CPT | Performed by: PHYSICIAN ASSISTANT

## 2022-10-06 ENCOUNTER — OFFICE VISIT (OUTPATIENT)
Dept: FAMILY MEDICINE CLINIC | Facility: CLINIC | Age: 52
End: 2022-10-06

## 2022-10-06 VITALS
WEIGHT: 291 LBS | HEIGHT: 73 IN | HEART RATE: 86 BPM | BODY MASS INDEX: 38.57 KG/M2 | OXYGEN SATURATION: 98 % | TEMPERATURE: 96.8 F | SYSTOLIC BLOOD PRESSURE: 118 MMHG | DIASTOLIC BLOOD PRESSURE: 70 MMHG

## 2022-10-06 DIAGNOSIS — M19.012 PRIMARY OSTEOARTHRITIS OF BOTH SHOULDERS: Primary | Chronic | ICD-10-CM

## 2022-10-06 DIAGNOSIS — Z23 ENCOUNTER FOR IMMUNIZATION: ICD-10-CM

## 2022-10-06 DIAGNOSIS — I10 ESSENTIAL HYPERTENSION: Chronic | ICD-10-CM

## 2022-10-06 DIAGNOSIS — M19.011 PRIMARY OSTEOARTHRITIS OF BOTH SHOULDERS: Primary | Chronic | ICD-10-CM

## 2022-10-06 DIAGNOSIS — F41.1 GAD (GENERALIZED ANXIETY DISORDER): ICD-10-CM

## 2022-10-06 DIAGNOSIS — E78.2 MIXED HYPERLIPIDEMIA: Chronic | ICD-10-CM

## 2022-10-06 PROCEDURE — 90686 IIV4 VACC NO PRSV 0.5 ML IM: CPT | Performed by: PHYSICIAN ASSISTANT

## 2022-10-06 PROCEDURE — 20610 DRAIN/INJ JOINT/BURSA W/O US: CPT | Performed by: PHYSICIAN ASSISTANT

## 2022-10-06 PROCEDURE — 90471 IMMUNIZATION ADMIN: CPT | Performed by: PHYSICIAN ASSISTANT

## 2022-10-06 PROCEDURE — 99214 OFFICE O/P EST MOD 30 MIN: CPT | Performed by: PHYSICIAN ASSISTANT

## 2022-10-06 RX ORDER — LOSARTAN POTASSIUM AND HYDROCHLOROTHIAZIDE 25; 100 MG/1; MG/1
1 TABLET ORAL EVERY MORNING
Qty: 90 TABLET | Refills: 3 | Status: SHIPPED | OUTPATIENT
Start: 2022-10-06 | End: 2023-03-31

## 2022-10-06 RX ORDER — FLUOXETINE HYDROCHLORIDE 40 MG/1
40 CAPSULE ORAL DAILY
Qty: 90 CAPSULE | Refills: 1 | Status: SHIPPED | OUTPATIENT
Start: 2022-10-06 | End: 2023-04-06 | Stop reason: SDUPTHER

## 2022-10-06 RX ORDER — POTASSIUM CHLORIDE 750 MG/1
10 TABLET, FILM COATED, EXTENDED RELEASE ORAL DAILY
Qty: 90 TABLET | Refills: 3 | Status: SHIPPED | OUTPATIENT
Start: 2022-10-06 | End: 2023-04-06 | Stop reason: SDUPTHER

## 2022-10-06 RX ORDER — ROSUVASTATIN CALCIUM 20 MG/1
20 TABLET, COATED ORAL DAILY
Qty: 90 TABLET | Refills: 1 | Status: SHIPPED | OUTPATIENT
Start: 2022-10-06 | End: 2023-04-06 | Stop reason: SDUPTHER

## 2022-10-06 NOTE — PROGRESS NOTES
"Subjective   Kwadwo Franks is a 52 y.o. male.       Chief Complaint -shoulder pain    History of Present Illness -    ROS    Shoulder pain-  Patient complains of moderate achy throbbing bilateral shoulder pain that is worse in the last week with colder weather.  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  No known specific injury reported.    Hyperlipidemia-stable with rosuvastatin and low-cholesterol diet    Generalized anxiety disorder-stable with fluoxetine    Hypertension-controlled with Hyzaar and dietary modification    The following portions of the patient's history were reviewed and updated as appropriate: allergies, current medications, past family history, past medical history, past social history, past surgical history and problem list.    Review of Systems    Objective  Vital signs:  /70   Pulse 86   Temp 96.8 °F (36 °C) (Temporal)   Ht 185.4 cm (73\")   Wt 132 kg (291 lb)   SpO2 98%   BMI 38.39 kg/m²     Physical Exam  Vitals and nursing note reviewed.   Constitutional:       Appearance: Normal appearance. He is well-developed.   Eyes:      Extraocular Movements: Extraocular movements intact.      Conjunctiva/sclera: Conjunctivae normal.   Cardiovascular:      Rate and Rhythm: Normal rate and regular rhythm.      Heart sounds: Normal heart sounds. No murmur heard.  Pulmonary:      Effort: Pulmonary effort is normal. No respiratory distress.      Breath sounds: Normal breath sounds. No wheezing.   Musculoskeletal:         General: Tenderness present.      Right lower leg: No edema.      Left lower leg: No edema.      Comments: Coarse crepitus noted in bilateral shoulder joints.  Tenderness noted worse with abduction without significant erythema or localized swelling appreciated no contusions appreciated   Skin:     General: Skin is warm and dry.      Findings: No rash.   Neurological:      Mental Status: He is alert and oriented to person, place, and time.   Psychiatric:         " Mood and Affect: Mood normal.         Behavior: Behavior normal.         Thought Content: Thought content normal.         The following data was reviewed by: TUAN Chris on 10/06/2022:  CMP    CMP 12/27/21 10/4/22   Glucose 119 (A) 125 (A)   BUN 12 15   Creatinine 0.90 0.90   eGFR Non African Am 89    Sodium 137 141   Potassium 3.5 3.7   Chloride 99 103   Calcium 9.6 9.6   Albumin 4.50 4.30   Total Bilirubin 0.4 0.4   Alkaline Phosphatase 63 59   AST (SGOT) 16 16   ALT (SGPT) 26 23   (A) Abnormal value              Lipid Panel    Lipid Panel 12/27/21 10/4/22   Total Cholesterol 191 175   Triglycerides 124 111   HDL Cholesterol 42 33 (A)   VLDL Cholesterol 22 20   LDL Cholesterol  127 (A) 122 (A)   LDL/HDL Ratio 2.96 3.63   (A) Abnormal value            TSH    TSH 12/27/21   TSH 2.480           Most Recent A1C    HGBA1C Most Recent 10/4/22   Hemoglobin A1C 6.90 (A)   (A) Abnormal value                   Assessment & Plan     Diagnoses and all orders for this visit:    1. Primary osteoarthritis of both shoulders (Primary)  Comments:  Bilateral intra-articular shoulder joint injections performed today    2. Encounter for immunization  -     FluLaval/Fluzone >6 mos ([id number])    3. Mixed hyperlipidemia  Comments:  Continue rosuvastatin  Advised low-cholesterol diet  Orders:  -     rosuvastatin (Crestor) 20 MG tablet; Take 1 tablet by mouth Daily.  Dispense: 90 tablet; Refill: 1  -     Lipid Panel; Future  -     Comprehensive Metabolic Panel; Future  -     Hemoglobin A1c; Future    4. SCOTT (generalized anxiety disorder)  Comments:  discontinue zoloft due to increased appetite  Continue Prozac  Orders:  -     FLUoxetine (PROzac) 40 MG capsule; Take 1 capsule by mouth Daily.  Dispense: 90 capsule; Refill: 1    5. Essential hypertension  Comments:  Continue Hyzaar  Advised to monitor BP and pulse daily and report any consistent readings greater 130/80  Orders:  -     losartan-hydrochlorothiazide (HYZAAR) 100-25 MG  per tablet; Take 1 tablet by mouth Every Morning.  Dispense: 90 tablet; Refill: 3    Other orders  -     potassium chloride 10 MEQ CR tablet; Take 1 tablet by mouth Daily.  Dispense: 90 tablet; Refill: 3    Procedure: Bilateral shoulder intra-articular joint injections  Provider: Arianna Kim PA-C  Indication: Bilateral shoulder pain secondary to acute exacerbation of chronic osteoarthritis  Timeout was taken to verify correct patient procedure and location  Description: The left and right shoulder joints were prepped and draped in sterile fashion.  An intra-articular injection was given into each shoulder joint using 3 cc 1% lidocaine, 1 cc of triamcinolone and 1 cc of dexamethasone.  Pressure was held and sterile dressings were placed.  No complications  Estimated blood loss: Minimal  Patient tolerated the procedure well    Lidocaine 1% 500 mg per 50 mL  NDC number 35115-082-05  Lot #[id number]  Expiration 1/24    Dexamethasone 4 mg/mL  NDC number 53255-334-49  Lot #[id number]  Expiration 2/23    Triamcinolone 40 mg/mL  NDC 7476-5192-28  Lot #[id number]  Expiration February 2024      Patient was given instructions and counseling regarding his condition or for health maintenance advice. Please see specific information pulled into the AVS if appropriate      This document has been electronically signed by:  Arianna Kim PA-C

## 2022-10-07 NOTE — PATIENT INSTRUCTIONS
Joint Steroid Injection  A joint steroid injection is a procedure to relieve swelling and pain in a joint. Steroids are medicines that reduce inflammation. In this procedure, your health care provider uses a syringe and a needle to inject a steroid medicine into a painful and inflamed joint. A pain-relieving medicine (anesthetic) may be injected along with the steroid. In some cases, your health care provider may use an imaging technique such as ultrasound or fluoroscopy to guide the injection.  Joints that are often treated with steroid injections include the knee, shoulder, hip, and spine. These injections may also be used in the elbow, ankle, and joints of the hands or feet. You may have joint steroid injections as part of your treatment for inflammation caused by:  Gout.  Rheumatoid arthritis.  Advanced wear-and-tear arthritis (osteoarthritis).  Tendinitis.  Bursitis.  Joint steroid injections may be repeated, but having them too often can damage a joint or the skin over the joint. You should not have joint steroid injections less than 6 weeks apart or more than four times a year.  Tell a health care provider about:  Any allergies you have.  All medicines you are taking, including vitamins, herbs, eye drops, creams, and over-the-counter medicines.  Any problems you or family members have had with anesthetic medicines.  Any blood disorders you have.  Any surgeries you have had.  Any medical conditions you have.  Whether you are pregnant or may be pregnant.  What are the risks?  Generally, this is a safe treatment. However, problems may occur, including:  Infection.  Bleeding.  Allergic reactions to medicines.  Damage to the joint or tissues around the joint.  Thinning of skin or loss of skin color over the joint.  Temporary flushing of the face or chest.  Temporary increase in pain.  Temporary increase in blood sugar.  Failure to relieve inflammation or pain.  What happens before the treatment?  Medicines  Ask  your health care provider about:  Changing or stopping your regular medicines. This is especially important if you are taking diabetes medicines or blood thinners.  Taking medicines such as aspirin and ibuprofen. These medicines can thin your blood. Do not take these medicines unless your health care provider tells you to take them.  Taking over-the-counter medicines, vitamins, herbs, and supplements.  General instructions  You may have imaging tests of your joint.  Ask your health care provider if you can drive yourself home after the procedure.  What happens during the treatment?    Your health care provider will position you for the injection and locate the injection site over your joint.  The skin over the joint will be cleaned with a germ-killing soap.  Your health care provider may:  Spray a numbing solution (topical anesthetic) over the injection site.  Inject a local anesthetic under the skin above your joint.  The needle will be placed through your skin into your joint. Your health care provider may use imaging to guide the needle to the right spot for the injection. If imaging is used, a special contrast dye may be injected to confirm that the needle is in the correct location.  The steroid medicine will be injected into your joint.  Anesthetic may be injected along with the steroid. This may be a medicine that relieves pain for a short time (short-acting anesthetic) or for a longer time (long-acting anesthetic).  The needle will be removed, and an adhesive bandage (dressing) will be placed over the injection site.  The procedure may vary among health care providers and hospitals.  What can I expect after the treatment?  You will be able to go home after the treatment.  It is normal to feel slight flushing for a few days after the injection.  After the treatment, it is common to have an increase in joint pain after the anesthetic has worn off. This may happen about an hour after a short-acting anesthetic  or about 8 hours after a longer-acting anesthetic.  You should begin to feel relief from joint pain and swelling after 24 to 48 hours. Contact your health care provider if you do not begin to feel relief after 2 days.  Follow these instructions at home:  Injection site care  Leave the adhesive dressing over your injection site in place until your health care provider says you can remove it.  Check your injection site every day for signs of infection. Check for:  More redness, swelling, or pain.  Fluid or blood.  Warmth.  Pus or a bad smell.  Activity  Return to your normal activities as told by your health care provider. Ask your health care provider what activities are safe for you. You may be asked to limit activities that put stress on the joint for a few days.  Do joint exercises as told by your health care provider.  Do not take baths, swim, or use a hot tub until your health care provider approves. Ask your health care provider if you may take showers. You may only be allowed to take sponge baths.  Managing pain, stiffness, and swelling    If directed, put ice on the joint. To do this:  Put ice in a plastic bag.  Place a towel between your skin and the bag.  Leave the ice on for 20 minutes, 2-3 times a day.  Remove the ice if your skin turns bright red. This is very important. If you cannot feel pain, heat, or cold, you have a greater risk of damage to the area.  Raise (elevate) your joint above the level of your heart when you are sitting or lying down.  General instructions  Take over-the-counter and prescription medicines only as told by your health care provider.  Do not use any products that contain nicotine or tobacco, such as cigarettes, e-cigarettes, and chewing tobacco. These can delay joint healing. If you need help quitting, ask your health care provider.  If you have diabetes, be aware that your blood sugar may be slightly elevated for several days after the injection.  Keep all follow-up visits.  This is important.  Contact a health care provider if you have:  Chills or a fever.  Any signs of infection at your injection site.  Increased pain or swelling or no relief after 2 days.  Summary  A joint steroid injection is a treatment to relieve pain and swelling in a joint.  Steroids are medicines that reduce inflammation. Your health care provider may add an anesthetic along with the steroid.  You may have joint steroid injections as part of your arthritis treatment.  Joint steroid injections may be repeated, but having them too often can damage a joint or the skin over the joint.  Contact your health care provider if you have a fever, chills, or signs of infection, or if you get no relief from joint pain or swelling.  This information is not intended to replace advice given to you by your health care provider. Make sure you discuss any questions you have with your health care provider.  Document Revised: 05/28/2021 Document Reviewed: 05/28/2021  Elsevier Patient Education © 2022 [employer]

## 2022-11-10 ENCOUNTER — CLINICAL SUPPORT (OUTPATIENT)
Dept: FAMILY MEDICINE CLINIC | Facility: CLINIC | Age: 52
End: 2022-11-10
Payer: COMMERCIAL

## 2022-11-10 DIAGNOSIS — E11.9 TYPE 2 DIABETES MELLITUS WITHOUT COMPLICATION, WITHOUT LONG-TERM CURRENT USE OF INSULIN: Primary | ICD-10-CM

## 2022-11-10 LAB
EXPIRATION DATE: NORMAL
HBA1C MFR BLD: 7.1 %
Lab: NORMAL

## 2022-11-10 PROCEDURE — 36416 COLLJ CAPILLARY BLOOD SPEC: CPT | Performed by: PHYSICIAN ASSISTANT

## 2022-11-10 PROCEDURE — 83036 HEMOGLOBIN GLYCOSYLATED A1C: CPT | Performed by: PHYSICIAN ASSISTANT

## 2022-11-10 RX ORDER — TIRZEPATIDE 2.5 MG/.5ML
0.25 INJECTION, SOLUTION SUBCUTANEOUS
Qty: 2 ML | Refills: 1 | Status: SHIPPED | OUTPATIENT
Start: 2022-11-10 | End: 2023-01-16

## 2023-01-16 DIAGNOSIS — E11.9 TYPE 2 DIABETES MELLITUS WITHOUT COMPLICATION, WITHOUT LONG-TERM CURRENT USE OF INSULIN: ICD-10-CM

## 2023-01-16 RX ORDER — TIRZEPATIDE 2.5 MG/.5ML
INJECTION, SOLUTION SUBCUTANEOUS
Qty: 2 ML | Refills: 1 | Status: SHIPPED | OUTPATIENT
Start: 2023-01-16 | End: 2023-03-31

## 2023-01-26 DIAGNOSIS — Z12.11 COLON CANCER SCREENING: Primary | ICD-10-CM

## 2023-03-31 DIAGNOSIS — I10 ESSENTIAL HYPERTENSION: Chronic | ICD-10-CM

## 2023-03-31 DIAGNOSIS — E11.9 TYPE 2 DIABETES MELLITUS WITHOUT COMPLICATION, WITHOUT LONG-TERM CURRENT USE OF INSULIN: ICD-10-CM

## 2023-03-31 RX ORDER — TIRZEPATIDE 2.5 MG/.5ML
INJECTION, SOLUTION SUBCUTANEOUS
Qty: 2 ML | Refills: 1 | Status: SHIPPED | OUTPATIENT
Start: 2023-03-31 | End: 2023-04-06

## 2023-03-31 RX ORDER — LOSARTAN POTASSIUM AND HYDROCHLOROTHIAZIDE 25; 100 MG/1; MG/1
1 TABLET ORAL EVERY MORNING
Qty: 30 TABLET | Refills: 5 | Status: SHIPPED | OUTPATIENT
Start: 2023-03-31

## 2023-04-06 ENCOUNTER — OFFICE VISIT (OUTPATIENT)
Dept: FAMILY MEDICINE CLINIC | Facility: CLINIC | Age: 53
End: 2023-04-06
Payer: COMMERCIAL

## 2023-04-06 VITALS
HEIGHT: 73 IN | RESPIRATION RATE: 14 BRPM | TEMPERATURE: 98.4 F | DIASTOLIC BLOOD PRESSURE: 82 MMHG | HEART RATE: 74 BPM | SYSTOLIC BLOOD PRESSURE: 140 MMHG | BODY MASS INDEX: 38.25 KG/M2 | WEIGHT: 288.6 LBS | OXYGEN SATURATION: 98 %

## 2023-04-06 DIAGNOSIS — I10 PRIMARY HYPERTENSION: ICD-10-CM

## 2023-04-06 DIAGNOSIS — F41.1 GAD (GENERALIZED ANXIETY DISORDER): ICD-10-CM

## 2023-04-06 DIAGNOSIS — E11.9 TYPE 2 DIABETES MELLITUS WITHOUT COMPLICATION, WITHOUT LONG-TERM CURRENT USE OF INSULIN: Primary | Chronic | ICD-10-CM

## 2023-04-06 DIAGNOSIS — E53.8 B12 DEFICIENCY: ICD-10-CM

## 2023-04-06 DIAGNOSIS — E78.2 MIXED HYPERLIPIDEMIA: Chronic | ICD-10-CM

## 2023-04-06 DIAGNOSIS — M19.011 PRIMARY OSTEOARTHRITIS OF BOTH SHOULDERS: Chronic | ICD-10-CM

## 2023-04-06 DIAGNOSIS — M19.012 PRIMARY OSTEOARTHRITIS OF BOTH SHOULDERS: Chronic | ICD-10-CM

## 2023-04-06 LAB
ALBUMIN SERPL-MCNC: 4.4 G/DL (ref 3.5–5.2)
ALBUMIN UR-MCNC: <1.2 MG/DL
ALBUMIN/GLOB SERPL: 1.3 G/DL
ALP SERPL-CCNC: 66 U/L (ref 39–117)
ALT SERPL W P-5'-P-CCNC: 26 U/L (ref 1–41)
ANION GAP SERPL CALCULATED.3IONS-SCNC: 11.5 MMOL/L (ref 5–15)
AST SERPL-CCNC: 17 U/L (ref 1–40)
BILIRUB SERPL-MCNC: 0.4 MG/DL (ref 0–1.2)
BUN SERPL-MCNC: 13 MG/DL (ref 6–20)
BUN/CREAT SERPL: 13.7 (ref 7–25)
CALCIUM SPEC-SCNC: 9.6 MG/DL (ref 8.6–10.5)
CHLORIDE SERPL-SCNC: 100 MMOL/L (ref 98–107)
CHOLEST SERPL-MCNC: 143 MG/DL (ref 0–200)
CO2 SERPL-SCNC: 27.5 MMOL/L (ref 22–29)
CREAT SERPL-MCNC: 0.95 MG/DL (ref 0.76–1.27)
EGFRCR SERPLBLD CKD-EPI 2021: 95.7 ML/MIN/1.73
GLOBULIN UR ELPH-MCNC: 3.3 GM/DL
GLUCOSE SERPL-MCNC: 105 MG/DL (ref 65–99)
HBA1C MFR BLD: 7.1 % (ref 4.8–5.6)
HDLC SERPL-MCNC: 35 MG/DL (ref 40–60)
LDLC SERPL CALC-MCNC: 89 MG/DL (ref 0–100)
LDLC/HDLC SERPL: 2.5 {RATIO}
POTASSIUM SERPL-SCNC: 3.9 MMOL/L (ref 3.5–5.2)
PROT SERPL-MCNC: 7.7 G/DL (ref 6–8.5)
SODIUM SERPL-SCNC: 139 MMOL/L (ref 136–145)
TRIGL SERPL-MCNC: 102 MG/DL (ref 0–150)
VLDLC SERPL-MCNC: 19 MG/DL (ref 5–40)

## 2023-04-06 PROCEDURE — 83036 HEMOGLOBIN GLYCOSYLATED A1C: CPT | Performed by: PHYSICIAN ASSISTANT

## 2023-04-06 PROCEDURE — 82043 UR ALBUMIN QUANTITATIVE: CPT | Performed by: PHYSICIAN ASSISTANT

## 2023-04-06 PROCEDURE — 80053 COMPREHEN METABOLIC PANEL: CPT | Performed by: PHYSICIAN ASSISTANT

## 2023-04-06 PROCEDURE — 80061 LIPID PANEL: CPT | Performed by: PHYSICIAN ASSISTANT

## 2023-04-06 RX ORDER — FLUOXETINE HYDROCHLORIDE 40 MG/1
40 CAPSULE ORAL DAILY
Qty: 90 CAPSULE | Refills: 1 | Status: SHIPPED | OUTPATIENT
Start: 2023-04-06

## 2023-04-06 RX ORDER — ROSUVASTATIN CALCIUM 20 MG/1
20 TABLET, COATED ORAL DAILY
Qty: 90 TABLET | Refills: 1 | Status: SHIPPED | OUTPATIENT
Start: 2023-04-06

## 2023-04-06 RX ORDER — POTASSIUM CHLORIDE 750 MG/1
10 TABLET, FILM COATED, EXTENDED RELEASE ORAL DAILY
Qty: 90 TABLET | Refills: 3 | Status: SHIPPED | OUTPATIENT
Start: 2023-04-06

## 2023-04-06 RX ORDER — TIRZEPATIDE 5 MG/.5ML
5 INJECTION, SOLUTION SUBCUTANEOUS
Qty: 2 ML | Refills: 2 | Status: SHIPPED | OUTPATIENT
Start: 2023-04-06

## 2023-04-06 RX ORDER — CYANOCOBALAMIN 1000 UG/ML
1000 INJECTION, SOLUTION INTRAMUSCULAR; SUBCUTANEOUS ONCE
Status: COMPLETED | OUTPATIENT
Start: 2023-04-06 | End: 2023-04-06

## 2023-04-06 RX ADMIN — CYANOCOBALAMIN 1000 MCG: 1000 INJECTION, SOLUTION INTRAMUSCULAR; SUBCUTANEOUS at 08:30

## 2023-04-06 NOTE — PROGRESS NOTES
"Subjective   Kwadwo Franks is a 52 y.o. male.       Chief Complaint -diabetes    History of Present Illness -    ROS    Diabetes-  Stable with Mounjaro and dietary modification.  Patient states he is interested in more appetite suppression.    Shoulder pain-  Patient complains of bilateral shoulder pain.  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  He states that both shoulders have flared up over the last week and pain is moderate and throbbing worse with abduction.    Generalized anxiety disorder-stable with fluoxetine    Hyperlipidemia-stable with rosuvastatin and low-cholesterol diet    Hypertension- controlled with Hyzaar    B12 deficiency-stable with B12 supplementation    The following portions of the patient's history were reviewed and updated as appropriate: allergies, current medications, past family history, past medical history, past social history, past surgical history and problem list.    Review of Systems    Objective  Vital signs:  /82   Pulse 74   Temp 98.4 °F (36.9 °C)   Resp 14   Ht 185.4 cm (72.99\")   Wt 131 kg (288 lb 9.6 oz)   SpO2 98%   BMI 38.08 kg/m²     Physical Exam  Vitals and nursing note reviewed.   Constitutional:       Appearance: Normal appearance. He is well-developed.   Eyes:      Extraocular Movements: Extraocular movements intact.      Conjunctiva/sclera: Conjunctivae normal.   Cardiovascular:      Rate and Rhythm: Normal rate and regular rhythm.      Heart sounds: Normal heart sounds. No murmur heard.  Pulmonary:      Effort: Pulmonary effort is normal. No respiratory distress.      Breath sounds: Normal breath sounds. No wheezing.   Musculoskeletal:         General: Tenderness present.      Comments: Coarse crepitus noted bilateral shoulders with tenderness to manipulation.  No erythema or localized swelling appreciated   Skin:     General: Skin is warm and dry.      Findings: No rash. "   Neurological:      Mental Status: He is alert and oriented to person, place, and time.   Psychiatric:         Mood and Affect: Mood normal.         Behavior: Behavior normal.         Thought Content: Thought content normal.         The following data was reviewed by: TUAN Chris on 04/06/2023:  CMP    CMP 10/4/22   Glucose 125 (A)   BUN 15   Creatinine 0.90   eGFR 102.1   Sodium 141   Potassium 3.7   Chloride 103   Calcium 9.6   Total Protein 6.8   Albumin 4.30   Globulin 2.5   Total Bilirubin 0.4   Alkaline Phosphatase 59   AST (SGOT) 16   ALT (SGPT) 23   Albumin/Globulin Ratio 1.7   BUN/Creatinine Ratio 16.7   Anion Gap 8.0   (A) Abnormal value       Comments are available for some flowsheets but are not being displayed.           Lipid Panel    Lipid Panel 10/4/22   Total Cholesterol 175   Triglycerides 111   HDL Cholesterol 33 (A)   VLDL Cholesterol 20   LDL Cholesterol  122 (A)   LDL/HDL Ratio 3.63   (A) Abnormal value                Most Recent A1C    HGBA1C Most Recent 11/10/22   Hemoglobin A1C 7.1                  Assessment & Plan     Diagnoses and all orders for this visit:    1. Type 2 diabetes mellitus without complication, without long-term current use of insulin (Primary)  Comments:  Increase Mounjaro 5 mg weekly  Advise low carbohydrate diabetic diet  Orders:  -     Tirzepatide (Mounjaro) 5 MG/0.5ML solution pen-injector; Inject 0.5 mL under the skin into the appropriate area as directed Every 7 (Seven) Days.  Dispense: 2 mL; Refill: 2  -     Comprehensive Metabolic Panel  -     Lipid Panel  -     Hemoglobin A1c  -     MicroAlbumin, Urine, Random - Urine, Clean Catch    2. Primary osteoarthritis of both shoulders  Comments:  Bilateral intra-articular shoulder injections performed today  Advised activity as tolerated    3. SCOTT (generalized anxiety disorder)  Comments:  discontinue zoloft due to increased appetite  Continue Prozac  Orders:  -     FLUoxetine (PROzac) 40 MG capsule; Take 1  capsule by mouth Daily.  Dispense: 90 capsule; Refill: 1    4. Mixed hyperlipidemia  Comments:  Continue rosuvastatin  Advised low-cholesterol diet  Orders:  -     rosuvastatin (Crestor) 20 MG tablet; Take 1 tablet by mouth Daily.  Dispense: 90 tablet; Refill: 1  -     Lipid Panel    5. Primary hypertension  -     potassium chloride 10 MEQ CR tablet; Take 1 tablet by mouth Daily.  Dispense: 90 tablet; Refill: 3  -     Comprehensive Metabolic Panel    6. B12 deficiency  -     cyanocobalamin injection 1,000 mcg    Procedure: Bilateral intra-articular shoulder joint injections  Provider: Arianna Kim PA-C  Indication: Bilateral shoulder pain secondary to osteoarthritis exacerbation  Timeout was taken to verify correct patient procedure and location  Description: Bilateral shoulders were prepped and draped in sterile fashion.  An intra-articular injection was given into each shoulder joint using 3 cc 1% lidocaine, 1 cc of dexamethasone and 1 cc of triamcinolone.  Pressure was held and sterile dressing was placed.  No complications  Estimated blood loss: Minimal  Patient tolerated the procedure well    Lidocaine 1% 10 mg/mL  NDC number 74398-589-41  Lot #[id number]  Expiration 2/26    Triamcinolone 40 mg/mL  NDC number 1235-4028-30  Lot #[id number]  Expiration February 2024    Dexamethasone 4 mg/mL  NDC number 67817-699-62  Lot #[id number]  Expiration 12/23          Patient was given instructions and counseling regarding his condition or for health maintenance advice. Please see specific information pulled into the AVS if appropriate      This document has been electronically signed by:  Arianna Kim PA-C

## 2023-05-09 ENCOUNTER — OFFICE VISIT (OUTPATIENT)
Dept: FAMILY MEDICINE CLINIC | Facility: CLINIC | Age: 53
End: 2023-05-09
Payer: COMMERCIAL

## 2023-05-09 VITALS
HEIGHT: 73 IN | DIASTOLIC BLOOD PRESSURE: 78 MMHG | OXYGEN SATURATION: 97 % | HEART RATE: 83 BPM | SYSTOLIC BLOOD PRESSURE: 118 MMHG | TEMPERATURE: 98.5 F | WEIGHT: 271 LBS | BODY MASS INDEX: 35.92 KG/M2

## 2023-05-09 DIAGNOSIS — E78.2 MIXED HYPERLIPIDEMIA: Chronic | ICD-10-CM

## 2023-05-09 DIAGNOSIS — E11.9 TYPE 2 DIABETES MELLITUS WITHOUT COMPLICATION, WITHOUT LONG-TERM CURRENT USE OF INSULIN: Primary | Chronic | ICD-10-CM

## 2023-05-09 DIAGNOSIS — E53.8 B12 DEFICIENCY: ICD-10-CM

## 2023-05-09 DIAGNOSIS — I10 PRIMARY HYPERTENSION: Chronic | ICD-10-CM

## 2023-05-09 RX ORDER — CYANOCOBALAMIN 1000 UG/ML
1000 INJECTION, SOLUTION INTRAMUSCULAR; SUBCUTANEOUS ONCE
Status: COMPLETED | OUTPATIENT
Start: 2023-05-09 | End: 2023-05-09

## 2023-05-09 RX ORDER — TIRZEPATIDE 5 MG/.5ML
5 INJECTION, SOLUTION SUBCUTANEOUS
Qty: 2 ML | Refills: 2 | Status: SHIPPED | OUTPATIENT
Start: 2023-05-09

## 2023-05-09 RX ADMIN — CYANOCOBALAMIN 1000 MCG: 1000 INJECTION, SOLUTION INTRAMUSCULAR; SUBCUTANEOUS at 09:40

## 2023-05-09 NOTE — PROGRESS NOTES
"Subjective   Kwadwo Franks is a 52 y.o. male.       Chief Complaint -diabetes    History of Present Illness -       Diabetes-  Improved with Mounjaro and low carbohydrate diet.  He has lost 16 pounds in the past month.  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  He reports significantly decreased appetite and improved glucose.  He states fasting glucose has been approximately 120s.    Hypertension-  Controlled with Hyzaar and potassium.  We discussed his hypertension with relation to weight loss and the need to check blood pressure and pulse at home.    Hyperlipidemia-  Stable with rosuvastatin and low-cholesterol diet      The following portions of the patient's history were reviewed and updated as appropriate: allergies, current medications, past family history, past medical history, past social history, past surgical history and problem list.        Objective  Vital signs:  /78   Pulse 83   Temp 98.5 °F (36.9 °C) (Temporal)   Ht 185.4 cm (73\")   Wt 123 kg (271 lb)   SpO2 97%   BMI 35.75 kg/m²     Physical Exam  Vitals and nursing note reviewed.   Constitutional:       Appearance: Normal appearance. He is well-developed.   Eyes:      Extraocular Movements: Extraocular movements intact.      Conjunctiva/sclera: Conjunctivae normal.   Cardiovascular:      Rate and Rhythm: Normal rate and regular rhythm.      Heart sounds: Normal heart sounds. No murmur heard.  Pulmonary:      Effort: Pulmonary effort is normal. No respiratory distress.      Breath sounds: Normal breath sounds. No wheezing.   Musculoskeletal:         General: No tenderness.   Skin:     General: Skin is warm and dry.      Findings: No rash.   Neurological:      Mental Status: He is alert and oriented to person, place, and time.   Psychiatric:         Mood and Affect: Mood normal.         Behavior: Behavior normal.         Thought Content: Thought content normal.         The following data was reviewed by " Arianna Kim PA-C:         Lab Results   Component Value Date    BUN 13 04/06/2023    CREATININE 0.95 04/06/2023    EGFR 95.7 04/06/2023    ALT 26 04/06/2023    AST 17 04/06/2023    CHOL 143 04/06/2023    TRIG 102 04/06/2023    HDL 35 (L) 04/06/2023    LDL 89 04/06/2023    TSH 2.480 12/27/2021    HGBA1C 7.10 (H) 04/06/2023           Assessment & Plan     Diagnoses and all orders for this visit:    1. Type 2 diabetes mellitus without complication, without long-term current use of insulin (Primary)  Comments:  Continue Mounjaro 5 mg weekly  Advise low carbohydrate diabetic diet  Orders:  -     Tirzepatide (Mounjaro) 5 MG/0.5ML solution pen-injector; Inject 0.5 mL under the skin into the appropriate area as directed Every 7 (Seven) Days.  Dispense: 2 mL; Refill: 2    2. Primary hypertension  Comments:  Continue Hyzaar and potassium  Discussed decreasing medication doses as blood pressure becomes better controlled with weight loss    3. Mixed hyperlipidemia  Comments:  Continue rosuvastatin  Advised low-cholesterol diet        Class 2 Severe Obesity (BMI >=35 and <=39.9). Obesity-related health conditions include the following: hypertension, diabetes mellitus and dyslipidemias. Obesity is improving with treatment. BMI is is above average; BMI management plan is completed. We discussed portion control and increasing exercise.          Patient was given instructions and counseling regarding his condition or for health maintenance advice. Please see specific information pulled into the AVS if appropriate      This document has been electronically signed by:  Arianna Kim PA-C

## 2023-05-09 NOTE — PATIENT INSTRUCTIONS
Carbohydrate Counting for Diabetes Mellitus, Adult  Carbohydrate counting is a method of keeping track of how many carbohydrates you eat. Eating carbohydrates increases the amount of sugar (glucose) in the blood. Counting how many carbohydrates you eat improves how well you manage your blood glucose. This, in turn, helps you manage your diabetes.  Carbohydrates are measured in grams (g) per serving. It is important to know how many carbohydrates (in grams or by serving size) you can have in each meal. This is different for every person. A dietitian can help you make a meal plan and calculate how many carbohydrates you should have at each meal and snack.  What foods contain carbohydrates?  Carbohydrates are found in the following foods:  Grains, such as breads and cereals.  Dried beans and soy products.  Starchy vegetables, such as potatoes, peas, and corn.  Fruit and fruit juices.  Milk and yogurt.  Sweets and snack foods, such as cake, cookies, candy, chips, and soft drinks.  How do I count carbohydrates in foods?  There are two ways to count carbohydrates in food. You can read food labels or learn standard serving sizes of foods. You can use either of these methods or a combination of both.  Using the Nutrition Facts label  The Nutrition Facts list is included on the labels of almost all packaged foods and beverages in the [place]. It includes:  The serving size.  Information about nutrients in each serving, including the grams of carbohydrate per serving.  To use the Nutrition Facts, decide how many servings you will have. Then, multiply the number of servings by the number of carbohydrates per serving. The resulting number is the total grams of carbohydrates that you will be having.  Learning the standard serving sizes of foods  When you eat carbohydrate foods that are not packaged or do not include Nutrition Facts on the label, you need to measure the servings in order to count the grams of  carbohydrates.  Measure the foods that you will eat with a food scale or measuring cup, if needed.  Decide how many standard-size servings you will eat.  Multiply the number of servings by 15. For foods that contain carbohydrates, one serving equals 15 g of carbohydrates.  For example, if you eat 2 cups or 10 oz (300 g) of strawberries, you will have eaten 2 servings and 30 g of carbohydrates (2 servings x 15 g = 30 g).  For foods that have more than one food mixed, such as soups and casseroles, you must count the carbohydrates in each food that is included.  The following list contains standard serving sizes of common carbohydrate-rich foods. Each of these servings has about 15 g of carbohydrates:  1 slice of bread.  1 six-inch (15 cm) tortilla.  ? cup or 2 oz (53 g) cooked rice or pasta.  ½ cup or 3 oz (85 g) cooked or canned, drained and rinsed beans or lentils.  ½ cup or 3 oz (85 g) starchy vegetable, such as peas, corn, or squash.  ½ cup or 4 oz (120 g) hot cereal.  ½ cup or 3 oz (85 g) boiled or mashed potatoes, or ¼ or 3 oz (85 g) of a large baked potato.  ½ cup or 4 fl oz (118 mL) fruit juice.  1 cup or 8 fl oz (237 mL) milk.  1 small or 4 oz (106 g) apple.  ½ or 2 oz (63 g) of a medium banana.  1 cup or 5 oz (150 g) strawberries.  3 cups or 1 oz (28.3 g) popped popcorn.  What is an example of carbohydrate counting?  To calculate the grams of carbohydrates in this sample meal, follow the steps shown below.  Sample meal  3 oz (85 g) chicken breast.  ? cup or 4 oz (106 g) brown rice.  ½ cup or 3 oz (85 g) corn.  1 cup or 8 fl oz (237 mL) milk.  1 cup or 5 oz (150 g) strawberries with sugar-free whipped topping.  Carbohydrate calculation  Identify the foods that contain carbohydrates:  Rice.  Corn.  Milk.  Strawberries.  Calculate how many servings you have of each food:  2 servings rice.  1 serving corn.  1 serving milk.  1 serving strawberries.  Multiply each number of servings by 15  servings rice x 15  g = 30 g.  1 serving corn x 15 g = 15 g.  1 serving milk x 15 g = 15 g.  1 serving strawberries x 15 g = 15 g.  Add together all of the amounts to find the total grams of carbohydrates eaten:  30 g + 15 g + 15 g + 15 g = 75 g of carbohydrates total.  What are tips for following this plan?  Shopping  Develop a meal plan and then make a shopping list.  Buy fresh and frozen vegetables, fresh and frozen fruit, dairy, eggs, beans, lentils, and whole grains.  Look at food labels. Choose foods that have more fiber and less sugar.  Avoid processed foods and foods with added sugars.  Meal planning  Aim to have the same number of grams of carbohydrates at each meal and for each snack time.  Plan to have regular, balanced meals and snacks.  Where to find more information  American Diabetes Association: diabetes.org  Centers for Disease Control and Prevention: cdc.gov  Academy of Nutrition and Dietetics: eatright.org  Association of Diabetes Care & Education Specialists: diabeteseducator.org  Summary  Carbohydrate counting is a method of keeping track of how many carbohydrates you eat.  Eating carbohydrates increases the amount of sugar (glucose) in your blood.  Counting how many carbohydrates you eat improves how well you manage your blood glucose. This helps you manage your diabetes.  A dietitian can help you make a meal plan and calculate how many carbohydrates you should have at each meal and snack.  This information is not intended to replace advice given to you by your health care provider. Make sure you discuss any questions you have with your health care provider.  Document Revised: 07/21/2021 Document Reviewed: 07/21/2021  Elsevier Patient Education © 2022 [employer]  Fat and Cholesterol Restricted Eating Plan  Getting too much fat and cholesterol in your diet may cause health problems. Choosing the right foods helps keep your fat and cholesterol at normal levels. This can keep you from getting certain  "diseases.  Your doctor may recommend an eating plan that includes:  Total fat: ______% or less of total calories a day. This is ______g of fat a day.  Saturated fat: ______% or less of total calories a day. This is ______g of saturated fat a day.  Cholesterol: less than _________mg a day.  Fiber: ______g a day.  What are tips for following this plan?  General tips  Work with your doctor to lose weight if you need to.  Avoid:  Foods with added sugar.  Fried foods.  Foods with trans fat or partially hydrogenated oils. This includes some margarines and baked goods.  If you drink alcohol:  Limit how much you have to:  0-1 drink a day for women who are not pregnant.  0-2 drinks a day for men.  Know how much alcohol is in a drink. In the [place] one drink equals one 12 oz bottle of beer (355 mL), one 5 oz glass of wine (148 mL), or one 1½ oz glass of hard liquor (44 mL).  Reading food labels  Check food labels for:  Trans fats.  Partially hydrogenated oils.  Saturated fat (g) in each serving.  Cholesterol (mg) in each serving.  Fiber (g) in each serving.  Choose foods with healthy fats, such as:  Monounsaturated fats and polyunsaturated fats. These include olive and canola oil, flaxseeds, walnuts, almonds, and seeds.  Omega-3 fats. These are found in certain fish, flaxseed oil, and ground flaxseeds.  Choose grain products that have whole grains. Look for the word \"whole\" as the first word in the ingredient list.  Cooking  Cook foods using low-fat methods. These include baking, boiling, grilling, and broiling.  Eat more home-cooked foods. Eat at restaurants and buffets less often. Eat less fast food.  Avoid cooking using saturated fats, such as butter, cream, palm oil, palm kernel oil, and coconut oil.  Meal planning    At meals, divide your plate into four equal parts:  Fill one-half of your plate with vegetables, green salads, and fruit.  Fill one-fourth of your plate with whole grains.  Fill one-fourth of your plate with " low-fat (lean) protein foods.  Eat fish that is high in omega-3 fats at least two times a week. This includes mackerel, tuna, sardines, and salmon.  Eat foods that are high in fiber, such as whole grains, beans, apples, pears, berries, broccoli, carrots, peas, and barley.  What foods should I eat?  Fruits  All fresh, canned (in natural juice), or frozen fruits.  Vegetables  Fresh or frozen vegetables (raw, steamed, roasted, or grilled). Green salads.  Grains  Whole grains, such as whole wheat or whole grain breads, crackers, cereals, and pasta. Unsweetened oatmeal, bulgur, barley, quinoa, or brown rice. Corn or whole wheat flour tortillas.  Meats and other protein foods  Ground beef (85% or leaner), grass-fed beef, or beef trimmed of fat. Skinless chicken or turkey. Ground chicken or turkey. Pork trimmed of fat. All fish and seafood. Egg whites. Dried beans, peas, or lentils. Unsalted nuts or seeds. Unsalted canned beans. Nut butters without added sugar or oil.  Dairy  Low-fat or nonfat dairy products, such as skim or 1% milk, 2% or reduced-fat cheeses, low-fat and fat-free ricotta or cottage cheese, or plain low-fat and nonfat yogurt.  Fats and oils  Tub margarine without trans fats. Light or reduced-fat mayonnaise and salad dressings. Avocado. Olive, canola, sesame, or safflower oils.  The items listed above may not be a complete list of foods and beverages you can eat. Contact a dietitian for more information.  What foods should I avoid?  Fruits  Canned fruit in heavy syrup. Fruit in cream or butter sauce. Fried fruit.  Vegetables  Vegetables cooked in cheese, cream, or butter sauce. Fried vegetables.  Grains  White bread. White pasta. White rice. Cornbread. Bagels, pastries, and croissants. Crackers and snack foods that contain trans fat and hydrogenated oils.  Meats and other protein foods  Fatty cuts of meat. Ribs, chicken wings, wiseman, sausage, bologna, salami, chitterlings, fatback, hot dogs, bratwurst, and  packaged lunch meats. Liver and organ meats. Whole eggs and egg yolks. Chicken and turkey with skin. Fried meat.  Dairy  Whole or 2% milk, cream, half-and-half, and cream cheese. Whole milk cheeses. Whole-fat or sweetened yogurt. Full-fat cheeses. Nondairy creamers and whipped toppings. Processed cheese, cheese spreads, and cheese curds.  Fats and oils  Butter, stick margarine, lard, shortening, ghee, or wiseman fat. Coconut, palm kernel, and palm oils.  Beverages  Alcohol. Sugar-sweetened drinks such as sodas, lemonade, and fruit drinks.  Sweets and desserts  Corn syrup, sugars, honey, and molasses. Candy. Jam and jelly. Syrup. Sweetened cereals. Cookies, pies, cakes, donuts, muffins, and ice cream.  The items listed above may not be a complete list of foods and beverages you should avoid. Contact a dietitian for more information.  Summary  Choosing the right foods helps keep your fat and cholesterol at normal levels. This can keep you from getting certain diseases.  At meals, fill one-half of your plate with vegetables, green salads, and fruits.  Eat high fiber foods, like whole grains, beans, apples, pears, berries, carrots, peas, and barley.  Limit added sugar, saturated fats, alcohol, and fried foods.  This information is not intended to replace advice given to you by your health care provider. Make sure you discuss any questions you have with your health care provider.  Document Revised: 04/29/2022 Document Reviewed: 04/29/2022  Elsevier Patient Education © 2022 [employer] Inc.

## 2023-06-19 ENCOUNTER — OFFICE VISIT (OUTPATIENT)
Dept: FAMILY MEDICINE CLINIC | Facility: CLINIC | Age: 53
End: 2023-06-19
Payer: COMMERCIAL

## 2023-06-19 VITALS
HEIGHT: 73 IN | BODY MASS INDEX: 36.18 KG/M2 | TEMPERATURE: 98.2 F | DIASTOLIC BLOOD PRESSURE: 78 MMHG | HEART RATE: 73 BPM | WEIGHT: 273 LBS | SYSTOLIC BLOOD PRESSURE: 128 MMHG | OXYGEN SATURATION: 96 %

## 2023-06-19 DIAGNOSIS — E78.2 MIXED HYPERLIPIDEMIA: Chronic | ICD-10-CM

## 2023-06-19 DIAGNOSIS — I10 PRIMARY HYPERTENSION: ICD-10-CM

## 2023-06-19 DIAGNOSIS — F41.1 GAD (GENERALIZED ANXIETY DISORDER): ICD-10-CM

## 2023-06-19 DIAGNOSIS — E11.9 TYPE 2 DIABETES MELLITUS WITHOUT COMPLICATION, WITHOUT LONG-TERM CURRENT USE OF INSULIN: Primary | Chronic | ICD-10-CM

## 2023-06-19 RX ORDER — ROSUVASTATIN CALCIUM 20 MG/1
20 TABLET, COATED ORAL DAILY
Qty: 90 TABLET | Refills: 1 | Status: SHIPPED | OUTPATIENT
Start: 2023-06-19

## 2023-06-19 RX ORDER — TIRZEPATIDE 5 MG/.5ML
5 INJECTION, SOLUTION SUBCUTANEOUS
Qty: 2 ML | Refills: 2 | Status: SHIPPED | OUTPATIENT
Start: 2023-06-19

## 2023-06-19 NOTE — PROGRESS NOTES
"Subjective   Kwadwo Franks is a 53 y.o. male.       Chief Complaint -diabetes    History of Present Illness -       Diabetes-  Improving with Mounjaro and dietary modification.  Patient has lost 17 pounds in the past month.  He reports some intermittent abdominal cramping that is tolerable.  He denies any nausea vomiting or diarrhea.    Generalized anxiety disorder-  Stable with Prozac.  Patient states that his mood has significantly improved with the summer weather.    Hypertension-  Improved with 17 pound weight loss and dietary modification    Hyperlipidemia-  Stable with rosuvastatin    The following portions of the patient's history were reviewed and updated as appropriate: allergies, current medications, past family history, past medical history, past social history, past surgical history and problem list.        Objective  Vital signs:  /78   Pulse 73   Temp 98.2 °F (36.8 °C)   Ht 185.4 cm (73\")   Wt 124 kg (273 lb)   SpO2 96%   BMI 36.02 kg/m²     Physical Exam  Vitals and nursing note reviewed.   Constitutional:       Appearance: Normal appearance. He is well-developed.   Eyes:      Extraocular Movements: Extraocular movements intact.      Conjunctiva/sclera: Conjunctivae normal.   Cardiovascular:      Rate and Rhythm: Normal rate and regular rhythm.      Heart sounds: Normal heart sounds. No murmur heard.  Pulmonary:      Effort: Pulmonary effort is normal. No respiratory distress.      Breath sounds: Normal breath sounds. No wheezing.   Musculoskeletal:         General: No tenderness.   Skin:     General: Skin is warm and dry.      Findings: No rash.   Neurological:      Mental Status: He is alert and oriented to person, place, and time.   Psychiatric:         Mood and Affect: Mood normal.         Behavior: Behavior normal.         Thought Content: Thought content normal.       The following data was reviewed by Arianna Kim PA-C:         Lab Results   Component Value Date    BUN 13 04/06/2023    " CREATININE 0.95 04/06/2023    EGFR 95.7 04/06/2023    ALT 26 04/06/2023    AST 17 04/06/2023    CHOL 143 04/06/2023    TRIG 102 04/06/2023    HDL 35 (L) 04/06/2023    LDL 89 04/06/2023    TSH 2.480 12/27/2021    HGBA1C 7.10 (H) 04/06/2023           Assessment & Plan     Diagnoses and all orders for this visit:    1. Type 2 diabetes mellitus without complication, without long-term current use of insulin (Primary)  Comments:  Continue Mounjaro 5 mg weekly  Advise low carbohydrate diabetic diet  Orders:  -     Tirzepatide (Mounjaro) 5 MG/0.5ML solution pen-injector; Inject 0.5 mL under the skin into the appropriate area as directed Every 7 (Seven) Days.  Dispense: 2 mL; Refill: 2    2. SCOTT (generalized anxiety disorder)  Comments:  discontinue zoloft due to increased appetite  Continue Prozac    3. Mixed hyperlipidemia  Comments:  Continue rosuvastatin  Advised low-cholesterol diet  Orders:  -     rosuvastatin (Crestor) 20 MG tablet; Take 1 tablet by mouth Daily.  Dispense: 90 tablet; Refill: 1    4. Primary hypertension  Comments:  Discontinue Hyzaar and potassium as blood pressure is now better controlled with conservative measures                   Patient was given instructions and counseling regarding his condition or for health maintenance advice. Please see specific information pulled into the AVS if appropriate      This document has been electronically signed by:  Arianna Kim PA-C

## 2023-07-25 DIAGNOSIS — E11.9 TYPE 2 DIABETES MELLITUS WITHOUT COMPLICATION, WITHOUT LONG-TERM CURRENT USE OF INSULIN: Chronic | ICD-10-CM

## 2023-07-25 RX ORDER — TIRZEPATIDE 5 MG/.5ML
INJECTION, SOLUTION SUBCUTANEOUS
Qty: 2 ML | Refills: 0 | Status: SHIPPED | OUTPATIENT
Start: 2023-07-25 | End: 2023-07-27

## 2023-07-27 ENCOUNTER — OFFICE VISIT (OUTPATIENT)
Dept: FAMILY MEDICINE CLINIC | Facility: CLINIC | Age: 53
End: 2023-07-27
Payer: COMMERCIAL

## 2023-07-27 VITALS
HEIGHT: 73 IN | SYSTOLIC BLOOD PRESSURE: 130 MMHG | DIASTOLIC BLOOD PRESSURE: 90 MMHG | BODY MASS INDEX: 36.05 KG/M2 | TEMPERATURE: 98.6 F | OXYGEN SATURATION: 97 % | WEIGHT: 272 LBS | HEART RATE: 91 BPM

## 2023-07-27 DIAGNOSIS — E53.8 B12 DEFICIENCY: ICD-10-CM

## 2023-07-27 DIAGNOSIS — M19.012 PRIMARY OSTEOARTHRITIS OF BOTH SHOULDERS: Chronic | ICD-10-CM

## 2023-07-27 DIAGNOSIS — S61.411A LACERATION OF RIGHT HAND WITHOUT FOREIGN BODY, INITIAL ENCOUNTER: Primary | ICD-10-CM

## 2023-07-27 DIAGNOSIS — Z23 IMMUNIZATION DUE: ICD-10-CM

## 2023-07-27 DIAGNOSIS — M19.011 PRIMARY OSTEOARTHRITIS OF BOTH SHOULDERS: Chronic | ICD-10-CM

## 2023-07-27 DIAGNOSIS — E11.9 TYPE 2 DIABETES MELLITUS WITHOUT COMPLICATION, WITHOUT LONG-TERM CURRENT USE OF INSULIN: Chronic | ICD-10-CM

## 2023-07-27 RX ORDER — TIRZEPATIDE 7.5 MG/.5ML
2 INJECTION, SOLUTION SUBCUTANEOUS
Qty: 2 ML | Refills: 3 | Status: SHIPPED | OUTPATIENT
Start: 2023-07-27

## 2023-07-27 RX ORDER — CYANOCOBALAMIN 1000 UG/ML
1000 INJECTION, SOLUTION INTRAMUSCULAR; SUBCUTANEOUS ONCE
Status: COMPLETED | OUTPATIENT
Start: 2023-07-27 | End: 2023-07-27

## 2023-07-27 RX ADMIN — CYANOCOBALAMIN 1000 MCG: 1000 INJECTION, SOLUTION INTRAMUSCULAR; SUBCUTANEOUS at 10:17

## 2023-07-27 NOTE — PATIENT INSTRUCTIONS
Joint Steroid Injection  A joint steroid injection is a procedure to relieve swelling and pain in a joint. Steroids are medicines that reduce inflammation. In this procedure, your health care provider uses a syringe and a needle to inject a steroid medicine into a painful and inflamed joint. A pain-relieving medicine (anesthetic) may be injected along with the steroid. In some cases, your health care provider may use an imaging technique such as ultrasound or fluoroscopy to guide the injection.  Joints that are often treated with steroid injections include the knee, shoulder, hip, and spine. These injections may also be used in the elbow, ankle, and joints of the hands or feet. You may have joint steroid injections as part of your treatment for inflammation caused by:  Gout.  Rheumatoid arthritis.  Advanced wear-and-tear arthritis (osteoarthritis).  Tendinitis.  Bursitis.  Joint steroid injections may be repeated, but having them too often can damage a joint or the skin over the joint. You should not have joint steroid injections less than 6 weeks apart or more than four times a year.  Tell a health care provider about:  Any allergies you have.  All medicines you are taking, including vitamins, herbs, eye drops, creams, and over-the-counter medicines.  Any problems you or family members have had with anesthetic medicines.  Any blood disorders you have.  Any surgeries you have had.  Any medical conditions you have.  Whether you are pregnant or may be pregnant.  What are the risks?  Generally, this is a safe treatment. However, problems may occur, including:  Infection.  Bleeding.  Allergic reactions to medicines.  Damage to the joint or tissues around the joint.  Thinning of skin or loss of skin color over the joint.  Temporary flushing of the face or chest.  Temporary increase in pain.  Temporary increase in blood sugar.  Failure to relieve inflammation or pain.  What happens before the treatment?  Medicines  Ask  your health care provider about:  Changing or stopping your regular medicines. This is especially important if you are taking diabetes medicines or blood thinners.  Taking medicines such as aspirin and ibuprofen. These medicines can thin your blood. Do not take these medicines unless your health care provider tells you to take them.  Taking over-the-counter medicines, vitamins, herbs, and supplements.  General instructions  You may have imaging tests of your joint.  Ask your health care provider if you can drive yourself home after the procedure.  What happens during the treatment?    Your health care provider will position you for the injection and locate the injection site over your joint.  The skin over the joint will be cleaned with a germ-killing soap.  Your health care provider may:  Spray a numbing solution (topical anesthetic) over the injection site.  Inject a local anesthetic under the skin above your joint.  The needle will be placed through your skin into your joint. Your health care provider may use imaging to guide the needle to the right spot for the injection. If imaging is used, a special contrast dye may be injected to confirm that the needle is in the correct location.  The steroid medicine will be injected into your joint.  Anesthetic may be injected along with the steroid. This may be a medicine that relieves pain for a short time (short-acting anesthetic) or for a longer time (long-acting anesthetic).  The needle will be removed, and an adhesive bandage (dressing) will be placed over the injection site.  The procedure may vary among health care providers and hospitals.  What can I expect after the treatment?  You will be able to go home after the treatment.  It is normal to feel slight flushing for a few days after the injection.  After the treatment, it is common to have an increase in joint pain after the anesthetic has worn off. This may happen about an hour after a short-acting anesthetic  or about 8 hours after a longer-acting anesthetic.  You should begin to feel relief from joint pain and swelling after 24 to 48 hours. Contact your health care provider if you do not begin to feel relief after 2 days.  Follow these instructions at home:  Injection site care  Leave the adhesive dressing over your injection site in place until your health care provider says you can remove it.  Check your injection site every day for signs of infection. Check for:  More redness, swelling, or pain.  Fluid or blood.  Warmth.  Pus or a bad smell.  Activity  Return to your normal activities as told by your health care provider. Ask your health care provider what activities are safe for you. You may be asked to limit activities that put stress on the joint for a few days.  Do joint exercises as told by your health care provider.  Do not take baths, swim, or use a hot tub until your health care provider approves. Ask your health care provider if you may take showers. You may only be allowed to take sponge baths.  Managing pain, stiffness, and swelling    If directed, put ice on the joint. To do this:  Put ice in a plastic bag.  Place a towel between your skin and the bag.  Leave the ice on for 20 minutes, 2-3 times a day.  Remove the ice if your skin turns bright red. This is very important. If you cannot feel pain, heat, or cold, you have a greater risk of damage to the area.  Raise (elevate) your joint above the level of your heart when you are sitting or lying down.  General instructions  Take over-the-counter and prescription medicines only as told by your health care provider.  Do not use any products that contain nicotine or tobacco, such as cigarettes, e-cigarettes, and chewing tobacco. These can delay joint healing. If you need help quitting, ask your health care provider.  If you have diabetes, be aware that your blood sugar may be slightly elevated for several days after the injection.  Keep all follow-up visits.  This is important.  Contact a health care provider if you have:  Chills or a fever.  Any signs of infection at your injection site.  Increased pain or swelling or no relief after 2 days.  Summary  A joint steroid injection is a treatment to relieve pain and swelling in a joint.  Steroids are medicines that reduce inflammation. Your health care provider may add an anesthetic along with the steroid.  You may have joint steroid injections as part of your arthritis treatment.  Joint steroid injections may be repeated, but having them too often can damage a joint or the skin over the joint.  Contact your health care provider if you have a fever, chills, or signs of infection, or if you get no relief from joint pain or swelling.  This information is not intended to replace advice given to you by your health care provider. Make sure you discuss any questions you have with your health care provider.  Document Revised: 05/28/2021 Document Reviewed: 05/28/2021  Elsevier Patient Education © 2023 [employer].

## 2023-07-27 NOTE — PROGRESS NOTES
"Charla Franks is a 53 y.o. male.       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  He states that he does have frequent scrapes and cuts with metal and glass due to his occupation.  He is due for a tetanus vaccination.  The laceration on the back of the right hand does not appear deep enough to need sutures.      Diabetes mellitus-  Not at goal with Mounjaro 5 mg daily.  Patient states that he is not having much appetite suppression and would like to try higher dose.    Shoulder pain-  Patient complains of pain in bilateral shoulder secondary to acute exacerbation of osteoarthritis.  Onset 2 weeks.  Pain worse with abduction and continued shoulder use.    B12 deficiency-  Stable with B12 supplementation    The following portions of the patient's history were reviewed and updated as appropriate: allergies, current medications, past family history, past medical history, past social history, past surgical history and problem list.        Objective  Vital signs:  /90   Pulse 91   Temp 98.6 °F (37 °C) (Temporal)   Ht 185.4 cm (73\")   Wt 123 kg (272 lb)   SpO2 97%   BMI 35.89 kg/m²     Physical Exam  Vitals and nursing note reviewed.   Constitutional:       Appearance: Normal appearance. He is well-developed.   Eyes:      Extraocular Movements: Extraocular movements intact.      Conjunctiva/sclera: Conjunctivae normal.   Cardiovascular:      Rate and Rhythm: Normal rate and regular rhythm.      Heart sounds: Normal heart sounds. No murmur heard.  Pulmonary:      Effort: Pulmonary effort is normal. No respiratory distress.      Breath sounds: Normal breath sounds. No wheezing.   Musculoskeletal:         General: Tenderness present.      Right lower leg: No edema.      Left lower leg: No edema.      Comments: Coarse crepitus noted with tenderness reproducible with bilateral shoulder " manipulation.   Skin:     General: Skin is warm and dry.      Findings: No rash.      Comments: Approximately 1 cm shallow laceration dorsum right hand   Neurological:      Mental Status: He is alert and oriented to person, place, and time.   Psychiatric:         Mood and Affect: Mood normal.         Behavior: Behavior normal.         Thought Content: Thought content normal.       The following data was reviewed by Arianna Kim PA-C:         Lab Results   Component Value Date    BUN 13 04/06/2023    CREATININE 0.95 04/06/2023    EGFR 95.7 04/06/2023    ALT 26 04/06/2023    AST 17 04/06/2023    CHOL 143 04/06/2023    TRIG 102 04/06/2023    HDL 35 (L) 04/06/2023    LDL 89 04/06/2023    TSH 2.480 12/27/2021    HGBA1C 7.10 (H) 04/06/2023           Assessment & Plan     Diagnoses and all orders for this visit:    1. Laceration of right hand without foreign body, initial encounter (Primary)  Comments:  Home wound care advised with conservative treatment.  Orders:  -     Tdap Vaccine => 8yo IM (BOOSTRIX)    2. Type 2 diabetes mellitus without complication, without long-term current use of insulin  Comments:  Increase Mounjaro 7.5 mg daily as this should help with glucose control as well as appetite suppression  Orders:  -     Tirzepatide (Mounjaro) 7.5 MG/0.5ML solution pen-injector; Inject 2 mL under the skin into the appropriate area as directed Every 7 (Seven) Days.  Dispense: 2 mL; Refill: 3    3. Immunization due  -     Tdap Vaccine => 8yo IM (BOOSTRIX)    4. B12 deficiency  -     cyanocobalamin injection 1,000 mcg    5. Primary osteoarthritis of both shoulders  Comments:  Bilateral intra-articular shoulder injections performed today    Procedure: Bilateral intra-articular shoulder injections  Provider: Arianna Kim PA-C  Indication: Shoulder pain secondary to osteoarthritis (bilateral)  Timeout was taken to verify correct patient procedure and location  Description: The right and left shoulders were prepped and draped  in sterile fashion.  An intra-articular injection was given into each shoulder joint using 3 cc 1% lidocaine, 1 cc of Depo-Medrol and 1 cc of triamcinolone.  Pressure was held and sterile dressings were placed.  No complications  Estimated blood loss: Minimal  Patient tolerated the procedures well    Lidocaine 1% 10 mg/mL  NDC number 60044-594-89  Lot #[id number]  Expiration 2/26    Triamcinolone 40 mg/mL  NDC number 0602-4730-90  Lot #[id number]  Expiration July 2024    Depo-Medrol 80 mg/mL  NDC number 1854-1882-59  Lot #[id number]  Expiration 5/2025                 Patient was given instructions and counseling regarding his condition or for health maintenance advice. Please see specific information pulled into the AVS if appropriate      This document has been electronically signed by:  Arianna Kim PA-C

## 2023-08-22 ENCOUNTER — OFFICE VISIT (OUTPATIENT)
Dept: FAMILY MEDICINE CLINIC | Facility: CLINIC | Age: 53
End: 2023-08-22
Payer: COMMERCIAL

## 2023-08-22 VITALS
BODY MASS INDEX: 33.93 KG/M2 | WEIGHT: 256 LBS | HEIGHT: 73 IN | TEMPERATURE: 98.1 F | OXYGEN SATURATION: 96 % | HEART RATE: 96 BPM | DIASTOLIC BLOOD PRESSURE: 79 MMHG | SYSTOLIC BLOOD PRESSURE: 124 MMHG

## 2023-08-22 DIAGNOSIS — E11.9 TYPE 2 DIABETES MELLITUS WITHOUT COMPLICATION, WITHOUT LONG-TERM CURRENT USE OF INSULIN: Chronic | ICD-10-CM

## 2023-08-22 DIAGNOSIS — I10 PRIMARY HYPERTENSION: Chronic | ICD-10-CM

## 2023-08-22 DIAGNOSIS — E78.2 MIXED HYPERLIPIDEMIA: Chronic | ICD-10-CM

## 2023-08-22 DIAGNOSIS — H65.02 NON-RECURRENT ACUTE SEROUS OTITIS MEDIA OF LEFT EAR: ICD-10-CM

## 2023-08-22 DIAGNOSIS — L25.5 DERMATITIS DUE TO PLANT: Primary | ICD-10-CM

## 2023-08-22 PROCEDURE — 99214 OFFICE O/P EST MOD 30 MIN: CPT | Performed by: PHYSICIAN ASSISTANT

## 2023-08-22 RX ORDER — METHYLPREDNISOLONE ACETATE 80 MG/ML
80 INJECTION, SUSPENSION INTRA-ARTICULAR; INTRALESIONAL; INTRAMUSCULAR; SOFT TISSUE ONCE
Status: SHIPPED | OUTPATIENT
Start: 2023-08-22

## 2023-08-22 RX ORDER — CEFTRIAXONE 1 G/1
1 INJECTION, POWDER, FOR SOLUTION INTRAMUSCULAR; INTRAVENOUS EVERY 24 HOURS
Status: SHIPPED | OUTPATIENT
Start: 2023-08-22 | End: 2023-08-23

## 2023-08-22 NOTE — PATIENT INSTRUCTIONS
"Fat and Cholesterol Restricted Eating Plan  Getting too much fat and cholesterol in your diet may cause health problems. Choosing the right foods helps keep your fat and cholesterol at normal levels. This can keep you from getting certain diseases.  Your doctor may recommend an eating plan that includes:  Total fat: ______% or less of total calories a day. This is ______g of fat a day.  Saturated fat: ______% or less of total calories a day. This is ______g of saturated fat a day.  Cholesterol: less than _________mg a day.  Fiber: ______g a day.  What are tips for following this plan?  General tips  Work with your doctor to lose weight if you need to.  Avoid:  Foods with added sugar.  Fried foods.  Foods with trans fat or partially hydrogenated oils. This includes some margarines and baked goods.  If you drink alcohol:  Limit how much you have to:  0-1 drink a day for women who are not pregnant.  0-2 drinks a day for men.  Know how much alcohol is in a drink. In the [place] one drink equals one 12 oz bottle of beer (355 mL), one 5 oz glass of wine (148 mL), or one 1« oz glass of hard liquor (44 mL).  Reading food labels  Check food labels for:  Trans fats.  Partially hydrogenated oils.  Saturated fat (g) in each serving.  Cholesterol (mg) in each serving.  Fiber (g) in each serving.  Choose foods with healthy fats, such as:  Monounsaturated fats and polyunsaturated fats. These include olive and canola oil, flaxseeds, walnuts, almonds, and seeds.  Omega-3 fats. These are found in certain fish, flaxseed oil, and ground flaxseeds.  Choose grain products that have whole grains. Look for the word \"whole\" as the first word in the ingredient list.  Cooking  Cook foods using low-fat methods. These include baking, boiling, grilling, and broiling.  Eat more home-cooked foods. Eat at restaurants and buffets less often. Eat less fast food.  Avoid cooking using saturated fats, such as butter, cream, palm oil, palm kernel oil, and " coconut oil.  Meal planning    At meals, divide your plate into four equal parts:  Fill one-half of your plate with vegetables, green salads, and fruit.  Fill one-fourth of your plate with whole grains.  Fill one-fourth of your plate with low-fat (lean) protein foods.  Eat fish that is high in omega-3 fats at least two times a week. This includes mackerel, tuna, sardines, and salmon.  Eat foods that are high in fiber, such as whole grains, beans, apples, pears, berries, broccoli, carrots, peas, and barley.  What foods should I eat?  Fruits  All fresh, canned (in natural juice), or frozen fruits.  Vegetables  Fresh or frozen vegetables (raw, steamed, roasted, or grilled). Green salads.  Grains  Whole grains, such as whole wheat or whole grain breads, crackers, cereals, and pasta. Unsweetened oatmeal, bulgur, barley, quinoa, or brown rice. Corn or whole wheat flour tortillas.  Meats and other protein foods  Ground beef (85% or leaner), grass-fed beef, or beef trimmed of fat. Skinless chicken or turkey. Ground chicken or turkey. Pork trimmed of fat. All fish and seafood. Egg whites. Dried beans, peas, or lentils. Unsalted nuts or seeds. Unsalted canned beans. Nut butters without added sugar or oil.  Dairy  Low-fat or nonfat dairy products, such as skim or 1% milk, 2% or reduced-fat cheeses, low-fat and fat-free ricotta or cottage cheese, or plain low-fat and nonfat yogurt.  Fats and oils  Tub margarine without trans fats. Light or reduced-fat mayonnaise and salad dressings. Avocado. Olive, canola, sesame, or safflower oils.  The items listed above may not be a complete list of foods and beverages you can eat. Contact a dietitian for more information.  What foods should I avoid?  Fruits  Canned fruit in heavy syrup. Fruit in cream or butter sauce. Fried fruit.  Vegetables  Vegetables cooked in cheese, cream, or butter sauce. Fried vegetables.  Grains  White bread. White pasta. White rice. Cornbread. Bagels, pastries,  and croissants. Crackers and snack foods that contain trans fat and hydrogenated oils.  Meats and other protein foods  Fatty cuts of meat. Ribs, chicken wings, wiseman, sausage, bologna, salami, chitterlings, fatback, hot dogs, bratwurst, and packaged lunch meats. Liver and organ meats. Whole eggs and egg yolks. Chicken and turkey with skin. Fried meat.  Dairy  Whole or 2% milk, cream, half-and-half, and cream cheese. Whole milk cheeses. Whole-fat or sweetened yogurt. Full-fat cheeses. Nondairy creamers and whipped toppings. Processed cheese, cheese spreads, and cheese curds.  Fats and oils  Butter, stick margarine, lard, shortening, ghee, or wiseman fat. Coconut, palm kernel, and palm oils.  Beverages  Alcohol. Sugar-sweetened drinks such as sodas, lemonade, and fruit drinks.  Sweets and desserts  Corn syrup, sugars, honey, and molasses. Candy. Jam and jelly. Syrup. Sweetened cereals. Cookies, pies, cakes, donuts, muffins, and ice cream.  The items listed above may not be a complete list of foods and beverages you should avoid. Contact a dietitian for more information.  Summary  Choosing the right foods helps keep your fat and cholesterol at normal levels. This can keep you from getting certain diseases.  At meals, fill one-half of your plate with vegetables, green salads, and fruits.  Eat high fiber foods, like whole grains, beans, apples, pears, berries, carrots, peas, and barley.  Limit added sugar, saturated fats, alcohol, and fried foods.  This information is not intended to replace advice given to you by your health care provider. Make sure you discuss any questions you have with your health care provider.  Document Revised: 04/29/2022 Document Reviewed: 04/29/2022  Elsevier Patient Education c 2023 [employer]

## 2023-08-22 NOTE — PROGRESS NOTES
"Subjective   Kwadwo Franks is a 53 y.o. male.       Chief Complaint -rash    History of Present Illness -       Rash-  Patient complains of rash on forearms after being exposed to poison ivy.  Onset 4 days.  He denies any shortness of breath.  There is associated pruritus at rash site.    Ear ache-  Patient complains of moderate sharp earache in the left ear that began 3 days ago.    Hypertension-  Significantly improved with weight loss.  He has not been taking his blood pressure medication in over a month.  Patient reports blood pressure has remained less than 130/80    Hyperlipidemia-  Stable currently with weight loss and dietary modification.  Patient states that he stopped taking his rosuvastatin about a month ago on his own.    Diabetes mellitus-  Stable with Mounjaro and dietary modification.    The following portions of the patient's history were reviewed and updated as appropriate: allergies, current medications, past family history, past medical history, past social history, past surgical history and problem list.        Objective  Vital signs:  /79   Pulse 96   Temp 98.1 øF (36.7 øC) (Temporal)   Ht 185.4 cm (73\")   Wt 116 kg (256 lb)   SpO2 96%   BMI 33.78 kg/mý     Physical Exam  Vitals and nursing note reviewed.   Constitutional:       General: He is not in acute distress.     Appearance: Normal appearance. He is well-developed. He is not diaphoretic.   HENT:      Head: Normocephalic and atraumatic.      Right Ear: Tympanic membrane, ear canal and external ear normal.      Left Ear: Ear canal and external ear normal.      Ears:      Comments: Left TM erythematous with clear fluid behind     Nose: Nose normal.      Mouth/Throat:      Mouth: Mucous membranes are moist.      Pharynx: Posterior oropharyngeal erythema present. No oropharyngeal exudate.   Eyes:      Extraocular Movements: Extraocular movements intact.      Conjunctiva/sclera: Conjunctivae normal.   Neck:      Thyroid: No " thyromegaly.   Cardiovascular:      Rate and Rhythm: Normal rate and regular rhythm.      Heart sounds: Normal heart sounds. No murmur heard.  Pulmonary:      Effort: Pulmonary effort is normal. No respiratory distress.      Breath sounds: Normal breath sounds. No wheezing or rales.   Musculoskeletal:         General: No tenderness.      Cervical back: Normal range of motion and neck supple.   Lymphadenopathy:      Cervical: Cervical adenopathy present.   Skin:     General: Skin is warm and dry.      Findings: Rash present.      Comments: Scattered vesicular lesions noted along forearms and hands bilaterally   Neurological:      Mental Status: He is alert and oriented to person, place, and time.   Psychiatric:         Mood and Affect: Mood normal.         Behavior: Behavior normal.         Thought Content: Thought content normal.       The following data was reviewed by Arianna Kim PA-C:         Lab Results   Component Value Date    BUN 13 04/06/2023    CREATININE 0.95 04/06/2023    EGFR 95.7 04/06/2023    ALT 26 04/06/2023    AST 17 04/06/2023    CHOL 143 04/06/2023    TRIG 102 04/06/2023    HDL 35 (L) 04/06/2023    LDL 89 04/06/2023    TSH 2.480 12/27/2021    HGBA1C 7.10 (H) 04/06/2023           Assessment & Plan     Diagnoses and all orders for this visit:    1. Dermatitis due to plant (Primary)  -     methylPREDNISolone acetate (DEPO-medrol) injection 80 mg    2. Primary hypertension  Comments:  Continue to monitor as patient is losing weight  Orders:  -     Comprehensive Metabolic Panel; Future    3. Mixed hyperlipidemia  Comments:  Recheck lab work in 1 month to assess need for lipid-lowering medication  Orders:  -     Comprehensive Metabolic Panel; Future  -     Lipid Panel; Future    4. Type 2 diabetes mellitus without complication, without long-term current use of insulin  Comments:  Continue Mounjaro 7.5 mg weekly  Advised a low carbohydrate diabetic diet    5. Non-recurrent acute serous otitis media of  left ear  -     cefTRIAXone (ROCEPHIN) injection 1 g                   Patient was given instructions and counseling regarding his condition or for health maintenance advice. Please see specific information pulled into the AVS if appropriate      This document has been electronically signed by:  Arianna Kim PA-C

## 2023-08-25 DIAGNOSIS — F41.1 GAD (GENERALIZED ANXIETY DISORDER): ICD-10-CM

## 2023-08-25 RX ORDER — FLUOXETINE HYDROCHLORIDE 40 MG/1
CAPSULE ORAL
Qty: 30 CAPSULE | Refills: 1 | Status: SHIPPED | OUTPATIENT
Start: 2023-08-25

## 2023-09-19 ENCOUNTER — LAB (OUTPATIENT)
Dept: FAMILY MEDICINE CLINIC | Facility: CLINIC | Age: 53
End: 2023-09-19
Payer: COMMERCIAL

## 2023-09-19 DIAGNOSIS — E78.2 MIXED HYPERLIPIDEMIA: Chronic | ICD-10-CM

## 2023-09-19 DIAGNOSIS — I10 PRIMARY HYPERTENSION: Chronic | ICD-10-CM

## 2023-09-19 LAB
ALBUMIN SERPL-MCNC: 4.6 G/DL (ref 3.5–5.2)
ALBUMIN/GLOB SERPL: 1.6 G/DL
ALP SERPL-CCNC: 64 U/L (ref 39–117)
ALT SERPL W P-5'-P-CCNC: 16 U/L (ref 1–41)
ANION GAP SERPL CALCULATED.3IONS-SCNC: 13.6 MMOL/L (ref 5–15)
AST SERPL-CCNC: 14 U/L (ref 1–40)
BILIRUB SERPL-MCNC: 0.5 MG/DL (ref 0–1.2)
BUN SERPL-MCNC: 12 MG/DL (ref 6–20)
BUN/CREAT SERPL: 13.6 (ref 7–25)
CALCIUM SPEC-SCNC: 9.4 MG/DL (ref 8.6–10.5)
CHLORIDE SERPL-SCNC: 101 MMOL/L (ref 98–107)
CHOLEST SERPL-MCNC: 247 MG/DL (ref 0–200)
CO2 SERPL-SCNC: 24.4 MMOL/L (ref 22–29)
CREAT SERPL-MCNC: 0.88 MG/DL (ref 0.76–1.27)
EGFRCR SERPLBLD CKD-EPI 2021: 102.2 ML/MIN/1.73
GLOBULIN UR ELPH-MCNC: 2.9 GM/DL
GLUCOSE SERPL-MCNC: 132 MG/DL (ref 65–99)
HDLC SERPL-MCNC: 41 MG/DL (ref 40–60)
LDLC SERPL CALC-MCNC: 187 MG/DL (ref 0–100)
LDLC/HDLC SERPL: 4.5 {RATIO}
POTASSIUM SERPL-SCNC: 3.4 MMOL/L (ref 3.5–5.2)
PROT SERPL-MCNC: 7.5 G/DL (ref 6–8.5)
SODIUM SERPL-SCNC: 139 MMOL/L (ref 136–145)
TRIGL SERPL-MCNC: 108 MG/DL (ref 0–150)
VLDLC SERPL-MCNC: 19 MG/DL (ref 5–40)

## 2023-09-19 PROCEDURE — 80053 COMPREHEN METABOLIC PANEL: CPT | Performed by: PHYSICIAN ASSISTANT

## 2023-09-19 PROCEDURE — 80061 LIPID PANEL: CPT | Performed by: PHYSICIAN ASSISTANT

## 2023-09-26 ENCOUNTER — OFFICE VISIT (OUTPATIENT)
Dept: FAMILY MEDICINE CLINIC | Facility: CLINIC | Age: 53
End: 2023-09-26
Payer: COMMERCIAL

## 2023-09-26 VITALS
SYSTOLIC BLOOD PRESSURE: 128 MMHG | TEMPERATURE: 97.9 F | WEIGHT: 257 LBS | DIASTOLIC BLOOD PRESSURE: 88 MMHG | BODY MASS INDEX: 34.06 KG/M2 | OXYGEN SATURATION: 97 % | HEART RATE: 82 BPM | HEIGHT: 73 IN

## 2023-09-26 DIAGNOSIS — E11.9 TYPE 2 DIABETES MELLITUS WITHOUT COMPLICATION, WITHOUT LONG-TERM CURRENT USE OF INSULIN: Primary | Chronic | ICD-10-CM

## 2023-09-26 DIAGNOSIS — F41.1 GAD (GENERALIZED ANXIETY DISORDER): ICD-10-CM

## 2023-09-26 DIAGNOSIS — Z23 IMMUNIZATION DUE: ICD-10-CM

## 2023-09-26 DIAGNOSIS — E78.2 MIXED HYPERLIPIDEMIA: Chronic | ICD-10-CM

## 2023-09-26 RX ORDER — ROSUVASTATIN CALCIUM 20 MG/1
TABLET, COATED ORAL
COMMUNITY
Start: 2023-09-20 | End: 2023-09-26 | Stop reason: SDUPTHER

## 2023-09-26 RX ORDER — TIRZEPATIDE 7.5 MG/.5ML
2 INJECTION, SOLUTION SUBCUTANEOUS
Qty: 2 ML | Refills: 3 | Status: SHIPPED | OUTPATIENT
Start: 2023-09-26

## 2023-09-26 RX ORDER — ROSUVASTATIN CALCIUM 20 MG/1
20 TABLET, COATED ORAL DAILY
Qty: 90 TABLET | Refills: 3 | Status: SHIPPED | OUTPATIENT
Start: 2023-09-26

## 2023-09-26 RX ORDER — FLUOXETINE HYDROCHLORIDE 40 MG/1
40 CAPSULE ORAL DAILY
Qty: 90 CAPSULE | Refills: 3 | Status: SHIPPED | OUTPATIENT
Start: 2023-09-26

## 2023-09-26 NOTE — PROGRESS NOTES
"Chief Complaint -diabetes    History of Present Illness -     Kwadwo Franks is a 53 y.o. male.     Diabetes-  Stable with Mounjaro 7.5 mg weekly and a low carbohydrate diet.  He has maintained stable weight since the last visit.  Patient does report decreased appetite and choosing healthier food options.    Generalized anxiety disorder-  Stable with fluoxetine.  He denies any hallucinations SI or HI.    Hyperlipidemia-  Stable with rosuvastatin and low-cholesterol diet      The following portions of the patient's history were reviewed and updated as appropriate: allergies, current medications, past family history, past medical history, past social history, past surgical history and problem list.        Objective  Vital signs:  /88   Pulse 82   Temp 97.9 °F (36.6 °C) (Temporal)   Ht 185.4 cm (73\")   Wt 117 kg (257 lb) Comment: rechecked for accuracy  SpO2 97%   BMI 33.91 kg/m²     Physical Exam  Vitals and nursing note reviewed.   Constitutional:       Appearance: Normal appearance. He is well-developed.   Eyes:      Extraocular Movements: Extraocular movements intact.      Conjunctiva/sclera: Conjunctivae normal.   Cardiovascular:      Rate and Rhythm: Normal rate and regular rhythm.      Heart sounds: Normal heart sounds. No murmur heard.  Pulmonary:      Effort: Pulmonary effort is normal. No respiratory distress.      Breath sounds: Normal breath sounds. No wheezing.   Musculoskeletal:         General: No tenderness.   Skin:     General: Skin is warm and dry.      Findings: No rash.   Neurological:      Mental Status: He is alert and oriented to person, place, and time.   Psychiatric:         Mood and Affect: Mood normal.         Behavior: Behavior normal.         Thought Content: Thought content normal.       The following data was reviewed by Arianna Kim PA-C:         Lab Results   Component Value Date    BUN 12 09/19/2023    CREATININE 0.88 09/19/2023    EGFR 102.2 09/19/2023    ALT 16 09/19/2023    " AST 14 09/19/2023    CHOL 247 (H) 09/19/2023    TRIG 108 09/19/2023    HDL 41 09/19/2023     (H) 09/19/2023    TSH 2.480 12/27/2021    HGBA1C 7.10 (H) 04/06/2023           Assessment & Plan     Diagnoses and all orders for this visit:    1. Type 2 diabetes mellitus without complication, without long-term current use of insulin (Primary)  Comments:  Continue Mounjaro 7.5 mg daily as this should help with glucose control as well as appetite suppression  Orders:  -     Tirzepatide (Mounjaro) 7.5 MG/0.5ML solution pen-injector; Inject 2 mL under the skin into the appropriate area as directed Every 7 (Seven) Days.  Dispense: 2 mL; Refill: 3    2. SCOTT (generalized anxiety disorder)  Comments:  discontinue zoloft due to increased appetite  Continue Prozac  Orders:  -     FLUoxetine (PROzac) 40 MG capsule; Take 1 capsule by mouth Daily.  Dispense: 90 capsule; Refill: 3    3. Mixed hyperlipidemia  Comments:  Continue rosuvastatin  Advised low-cholesterol diet  Orders:  -     rosuvastatin (CRESTOR) 20 MG tablet; Take 1 tablet by mouth Daily.  Dispense: 90 tablet; Refill: 3    4. Immunization due  -     Fluzone (or Fluarix & Flulaval for VFC) >6 Mos (0822-2574)  -     Pneumococcal Conjugate Vaccine 20-Valent (PCV20)                   Patient was given instructions and counseling regarding his condition or for health maintenance advice. Please see specific information pulled into the AVS if appropriate      This document has been electronically signed by:  Arianna Kim PA-C

## 2023-09-26 NOTE — PATIENT INSTRUCTIONS
"Fat and Cholesterol Restricted Eating Plan  Getting too much fat and cholesterol in your diet may cause health problems. Choosing the right foods helps keep your fat and cholesterol at normal levels. This can keep you from getting certain diseases.  Your doctor may recommend an eating plan that includes:  Total fat: ______% or less of total calories a day. This is ______g of fat a day.  Saturated fat: ______% or less of total calories a day. This is ______g of saturated fat a day.  Cholesterol: less than _________mg a day.  Fiber: ______g a day.  What are tips for following this plan?  General tips  Work with your doctor to lose weight if you need to.  Avoid:  Foods with added sugar.  Fried foods.  Foods with trans fat or partially hydrogenated oils. This includes some margarines and baked goods.  If you drink alcohol:  Limit how much you have to:  0-1 drink a day for women who are not pregnant.  0-2 drinks a day for men.  Know how much alcohol is in a drink. In the [place] one drink equals one 12 oz bottle of beer (355 mL), one 5 oz glass of wine (148 mL), or one 1½ oz glass of hard liquor (44 mL).  Reading food labels  Check food labels for:  Trans fats.  Partially hydrogenated oils.  Saturated fat (g) in each serving.  Cholesterol (mg) in each serving.  Fiber (g) in each serving.  Choose foods with healthy fats, such as:  Monounsaturated fats and polyunsaturated fats. These include olive and canola oil, flaxseeds, walnuts, almonds, and seeds.  Omega-3 fats. These are found in certain fish, flaxseed oil, and ground flaxseeds.  Choose grain products that have whole grains. Look for the word \"whole\" as the first word in the ingredient list.  Cooking  Cook foods using low-fat methods. These include baking, boiling, grilling, and broiling.  Eat more home-cooked foods. Eat at restaurants and buffets less often. Eat less fast food.  Avoid cooking using saturated fats, such as butter, cream, palm oil, palm kernel oil, and " coconut oil.  Meal planning    At meals, divide your plate into four equal parts:  Fill one-half of your plate with vegetables, green salads, and fruit.  Fill one-fourth of your plate with whole grains.  Fill one-fourth of your plate with low-fat (lean) protein foods.  Eat fish that is high in omega-3 fats at least two times a week. This includes mackerel, tuna, sardines, and salmon.  Eat foods that are high in fiber, such as whole grains, beans, apples, pears, berries, broccoli, carrots, peas, and barley.  What foods should I eat?  Fruits  All fresh, canned (in natural juice), or frozen fruits.  Vegetables  Fresh or frozen vegetables (raw, steamed, roasted, or grilled). Green salads.  Grains  Whole grains, such as whole wheat or whole grain breads, crackers, cereals, and pasta. Unsweetened oatmeal, bulgur, barley, quinoa, or brown rice. Corn or whole wheat flour tortillas.  Meats and other protein foods  Ground beef (85% or leaner), grass-fed beef, or beef trimmed of fat. Skinless chicken or turkey. Ground chicken or turkey. Pork trimmed of fat. All fish and seafood. Egg whites. Dried beans, peas, or lentils. Unsalted nuts or seeds. Unsalted canned beans. Nut butters without added sugar or oil.  Dairy  Low-fat or nonfat dairy products, such as skim or 1% milk, 2% or reduced-fat cheeses, low-fat and fat-free ricotta or cottage cheese, or plain low-fat and nonfat yogurt.  Fats and oils  Tub margarine without trans fats. Light or reduced-fat mayonnaise and salad dressings. Avocado. Olive, canola, sesame, or safflower oils.  The items listed above may not be a complete list of foods and beverages you can eat. Contact a dietitian for more information.  What foods should I avoid?  Fruits  Canned fruit in heavy syrup. Fruit in cream or butter sauce. Fried fruit.  Vegetables  Vegetables cooked in cheese, cream, or butter sauce. Fried vegetables.  Grains  White bread. White pasta. White rice. Cornbread. Bagels, pastries,  and croissants. Crackers and snack foods that contain trans fat and hydrogenated oils.  Meats and other protein foods  Fatty cuts of meat. Ribs, chicken wings, wiseman, sausage, bologna, salami, chitterlings, fatback, hot dogs, bratwurst, and packaged lunch meats. Liver and organ meats. Whole eggs and egg yolks. Chicken and turkey with skin. Fried meat.  Dairy  Whole or 2% milk, cream, half-and-half, and cream cheese. Whole milk cheeses. Whole-fat or sweetened yogurt. Full-fat cheeses. Nondairy creamers and whipped toppings. Processed cheese, cheese spreads, and cheese curds.  Fats and oils  Butter, stick margarine, lard, shortening, ghee, or wiseman fat. Coconut, palm kernel, and palm oils.  Beverages  Alcohol. Sugar-sweetened drinks such as sodas, lemonade, and fruit drinks.  Sweets and desserts  Corn syrup, sugars, honey, and molasses. Candy. Jam and jelly. Syrup. Sweetened cereals. Cookies, pies, cakes, donuts, muffins, and ice cream.  The items listed above may not be a complete list of foods and beverages you should avoid. Contact a dietitian for more information.  Summary  Choosing the right foods helps keep your fat and cholesterol at normal levels. This can keep you from getting certain diseases.  At meals, fill one-half of your plate with vegetables, green salads, and fruits.  Eat high fiber foods, like whole grains, beans, apples, pears, berries, carrots, peas, and barley.  Limit added sugar, saturated fats, alcohol, and fried foods.  This information is not intended to replace advice given to you by your health care provider. Make sure you discuss any questions you have with your health care provider.  Document Revised: 04/29/2022 Document Reviewed: 04/29/2022  Elsevier Patient Education © 2023 Else[employer] Inc.  Carbohydrate Counting for Diabetes Mellitus, Adult  Carbohydrate counting is a method of keeping track of how many carbohydrates you eat. Eating carbohydrates increases the amount of sugar (glucose) in  the blood. Counting how many carbohydrates you eat improves how well you manage your blood glucose. This, in turn, helps you manage your diabetes.  Carbohydrates are measured in grams (g) per serving. It is important to know how many carbohydrates (in grams or by serving size) you can have in each meal. This is different for every person. A dietitian can help you make a meal plan and calculate how many carbohydrates you should have at each meal and snack.  What foods contain carbohydrates?  Carbohydrates are found in the following foods:  Grains, such as breads and cereals.  Dried beans and soy products.  Starchy vegetables, such as potatoes, peas, and corn.  Fruit and fruit juices.  Milk and yogurt.  Sweets and snack foods, such as cake, cookies, candy, chips, and soft drinks.  How do I count carbohydrates in foods?  There are two ways to count carbohydrates in food. You can read food labels or learn standard serving sizes of foods. You can use either of these methods or a combination of both.  Using the Nutrition Facts label  The Nutrition Facts list is included on the labels of almost all packaged foods and beverages in the [place]. It includes:  The serving size.  Information about nutrients in each serving, including the grams of carbohydrate per serving.  To use the Nutrition Facts, decide how many servings you will have. Then, multiply the number of servings by the number of carbohydrates per serving. The resulting number is the total grams of carbohydrates that you will be having.  Learning the standard serving sizes of foods  When you eat carbohydrate foods that are not packaged or do not include Nutrition Facts on the label, you need to measure the servings in order to count the grams of carbohydrates.  Measure the foods that you will eat with a food scale or measuring cup, if needed.  Decide how many standard-size servings you will eat.  Multiply the number of servings by 15. For foods that contain  carbohydrates, one serving equals 15 g of carbohydrates.  For example, if you eat 2 cups or 10 oz (300 g) of strawberries, you will have eaten 2 servings and 30 g of carbohydrates (2 servings x 15 g = 30 g).  For foods that have more than one food mixed, such as soups and casseroles, you must count the carbohydrates in each food that is included.  The following list contains standard serving sizes of common carbohydrate-rich foods. Each of these servings has about 15 g of carbohydrates:  1 slice of bread.  1 six-inch (15 cm) tortilla.  ? cup or 2 oz (53 g) cooked rice or pasta.  ½ cup or 3 oz (85 g) cooked or canned, drained and rinsed beans or lentils.  ½ cup or 3 oz (85 g) starchy vegetable, such as peas, corn, or squash.  ½ cup or 4 oz (120 g) hot cereal.  ½ cup or 3 oz (85 g) boiled or mashed potatoes, or ¼ or 3 oz (85 g) of a large baked potato.  ½ cup or 4 fl oz (118 mL) fruit juice.  1 cup or 8 fl oz (237 mL) milk.  1 small or 4 oz (106 g) apple.  ½ or 2 oz (63 g) of a medium banana.  1 cup or 5 oz (150 g) strawberries.  3 cups or 1 oz (28.3 g) popped popcorn.  What is an example of carbohydrate counting?  To calculate the grams of carbohydrates in this sample meal, follow the steps shown below.  Sample meal  3 oz (85 g) chicken breast.  ? cup or 4 oz (106 g) brown rice.  ½ cup or 3 oz (85 g) corn.  1 cup or 8 fl oz (237 mL) milk.  1 cup or 5 oz (150 g) strawberries with sugar-free whipped topping.  Carbohydrate calculation  Identify the foods that contain carbohydrates:  Rice.  Corn.  Milk.  Strawberries.  Calculate how many servings you have of each food:  2 servings rice.  1 serving corn.  1 serving milk.  1 serving strawberries.  Multiply each number of servings by 15  servings rice x 15 g = 30 g.  1 serving corn x 15 g = 15 g.  1 serving milk x 15 g = 15 g.  1 serving strawberries x 15 g = 15 g.  Add together all of the amounts to find the total grams of carbohydrates eaten:  30 g + 15 g + 15 g + 15  g = 75 g of carbohydrates total.  What are tips for following this plan?  Shopping  Develop a meal plan and then make a shopping list.  Buy fresh and frozen vegetables, fresh and frozen fruit, dairy, eggs, beans, lentils, and whole grains.  Look at food labels. Choose foods that have more fiber and less sugar.  Avoid processed foods and foods with added sugars.  Meal planning  Aim to have the same number of grams of carbohydrates at each meal and for each snack time.  Plan to have regular, balanced meals and snacks.  Where to find more information  American Diabetes Association: diabetes.org  Centers for Disease Control and Prevention: cdc.gov  Academy of Nutrition and Dietetics: eatright.org  Association of Diabetes Care & Education Specialists: diabeteseducator.org  Summary  Carbohydrate counting is a method of keeping track of how many carbohydrates you eat.  Eating carbohydrates increases the amount of sugar (glucose) in your blood.  Counting how many carbohydrates you eat improves how well you manage your blood glucose. This helps you manage your diabetes.  A dietitian can help you make a meal plan and calculate how many carbohydrates you should have at each meal and snack.  This information is not intended to replace advice given to you by your health care provider. Make sure you discuss any questions you have with your health care provider.  Document Revised: 07/21/2021 Document Reviewed: 07/21/2021  Elsevier Patient Education © 2023 Elsevier Inc.

## 2023-10-20 DIAGNOSIS — E78.2 MIXED HYPERLIPIDEMIA: Chronic | ICD-10-CM

## 2023-10-23 RX ORDER — ROSUVASTATIN CALCIUM 20 MG/1
20 TABLET, COATED ORAL DAILY
Qty: 30 TABLET | Refills: 1 | Status: SHIPPED | OUTPATIENT
Start: 2023-10-23

## 2023-11-06 DIAGNOSIS — U07.1 COVID-19 VIRUS INFECTION: Primary | ICD-10-CM

## 2023-11-06 RX ORDER — DEXTROMETHORPHAN HYDROBROMIDE AND PROMETHAZINE HYDROCHLORIDE 15; 6.25 MG/5ML; MG/5ML
5 SYRUP ORAL 4 TIMES DAILY PRN
Qty: 180 ML | Refills: 0 | Status: SHIPPED | OUTPATIENT
Start: 2023-11-06

## 2023-11-06 RX ORDER — AZITHROMYCIN 250 MG/1
TABLET, FILM COATED ORAL
Qty: 6 TABLET | Refills: 0 | Status: SHIPPED | OUTPATIENT
Start: 2023-11-06

## 2023-11-13 RX ORDER — CHLORCYCLIZINE HYDROCHLORIDE AND PSEUDOEPHEDRINE HYDROCHLORIDE 25; 60 MG/1; MG/1
1 TABLET ORAL 2 TIMES DAILY PRN
Qty: 30 TABLET | Refills: 0 | Status: SHIPPED | OUTPATIENT
Start: 2023-11-13

## 2023-11-30 ENCOUNTER — OFFICE VISIT (OUTPATIENT)
Dept: FAMILY MEDICINE CLINIC | Facility: CLINIC | Age: 53
End: 2023-11-30
Payer: COMMERCIAL

## 2023-11-30 VITALS
SYSTOLIC BLOOD PRESSURE: 132 MMHG | TEMPERATURE: 98.4 F | WEIGHT: 263 LBS | DIASTOLIC BLOOD PRESSURE: 82 MMHG | HEART RATE: 89 BPM | OXYGEN SATURATION: 97 % | BODY MASS INDEX: 34.85 KG/M2 | HEIGHT: 73 IN

## 2023-11-30 DIAGNOSIS — M19.011 PRIMARY OSTEOARTHRITIS OF BOTH SHOULDERS: Primary | Chronic | ICD-10-CM

## 2023-11-30 DIAGNOSIS — M19.012 PRIMARY OSTEOARTHRITIS OF BOTH SHOULDERS: Primary | Chronic | ICD-10-CM

## 2023-11-30 DIAGNOSIS — E53.8 B12 DEFICIENCY: ICD-10-CM

## 2023-11-30 DIAGNOSIS — E11.9 TYPE 2 DIABETES MELLITUS WITHOUT COMPLICATION, WITHOUT LONG-TERM CURRENT USE OF INSULIN: Chronic | ICD-10-CM

## 2023-11-30 DIAGNOSIS — E78.2 MIXED HYPERLIPIDEMIA: Chronic | ICD-10-CM

## 2023-11-30 LAB — HBA1C MFR BLD: 6 % (ref 4.5–5.7)

## 2023-11-30 RX ORDER — CYANOCOBALAMIN 1000 UG/ML
1000 INJECTION, SOLUTION INTRAMUSCULAR; SUBCUTANEOUS
Status: SHIPPED | OUTPATIENT
Start: 2023-11-30

## 2023-11-30 RX ORDER — ROSUVASTATIN CALCIUM 20 MG/1
20 TABLET, COATED ORAL DAILY
Qty: 90 TABLET | Refills: 3 | Status: SHIPPED | OUTPATIENT
Start: 2023-11-30

## 2023-11-30 RX ORDER — TIRZEPATIDE 7.5 MG/.5ML
2 INJECTION, SOLUTION SUBCUTANEOUS
Qty: 2 ML | Refills: 3 | Status: SHIPPED | OUTPATIENT
Start: 2023-11-30

## 2023-11-30 RX ADMIN — CYANOCOBALAMIN 1000 MCG: 1000 INJECTION, SOLUTION INTRAMUSCULAR; SUBCUTANEOUS at 09:21

## 2023-11-30 NOTE — PROGRESS NOTES
"Chief Complaint -shoulder pain    History of Present Illness -     Kwadwo Franks is a 53 y.o. male.     Shoulder pain-  He complains of pain in the bilateral shoulder secondary to exacerbation of osteoarthritis.  He does have a physically demanding job.  Worse with cold weather changes over the past week.  He denies any new known injury.    Diabetes mellitus-  Controlled with Mounjaro.  Patient states he has been out of the Mounjaro for several weeks due to insurance stopped covering it.    Hyperlipidemia-  Stable with rosuvastatin and low-cholesterol diet    B12 deficiency-  Stable with B12 supplementation      The following portions of the patient's history were reviewed and updated as appropriate: allergies, current medications, past family history, past medical history, past social history, past surgical history and problem list.        Objective  Vital signs:  /82   Pulse 89   Temp 98.4 °F (36.9 °C) (Temporal)   Ht 185.4 cm (73\")   Wt 119 kg (263 lb)   SpO2 97%   BMI 34.70 kg/m²     Physical Exam  Vitals and nursing note reviewed.   Constitutional:       Appearance: Normal appearance. He is well-developed.   Eyes:      Extraocular Movements: Extraocular movements intact.      Conjunctiva/sclera: Conjunctivae normal.   Cardiovascular:      Rate and Rhythm: Normal rate and regular rhythm.      Heart sounds: Normal heart sounds. No murmur heard.  Pulmonary:      Effort: Pulmonary effort is normal. No respiratory distress.      Breath sounds: Normal breath sounds. No wheezing.   Musculoskeletal:         General: Tenderness present.      Comments: Coarse crepitus noted in bilateral shoulder joints without erythema or contusions appreciated   Skin:     General: Skin is warm and dry.      Findings: No rash.   Neurological:      Mental Status: He is alert and oriented to person, place, and time.   Psychiatric:         Mood and Affect: Mood normal.         Behavior: Behavior normal.         Thought Content: " Thought content normal.         The following data was reviewed by Arianna Kim PA-C:         Lab Results   Component Value Date    BUN 12 09/19/2023    CREATININE 0.88 09/19/2023    EGFR 102.2 09/19/2023    ALT 16 09/19/2023    AST 14 09/19/2023    CHOL 247 (H) 09/19/2023    TRIG 108 09/19/2023    HDL 41 09/19/2023     (H) 09/19/2023    TSH 2.480 12/27/2021    HGBA1C 6.0 (A) 11/30/2023           Assessment & Plan     Diagnoses and all orders for this visit:    1. Primary osteoarthritis of both shoulders (Primary)  Comments:  Intra-articular bilateral shoulder injections performed today  Advised conservative measures and activity as tolerated    2. Type 2 diabetes mellitus without complication, without long-term current use of insulin  Comments:  Plan to try to PA Mounjaro 7.5 mg daily as the patient was controlled on this medication and tolerating it well  Orders:  -     Tirzepatide (Mounjaro) 7.5 MG/0.5ML solution pen-injector; Inject 2 mL under the skin into the appropriate area as directed Every 7 (Seven) Days.  Dispense: 2 mL; Refill: 3  -     Comprehensive Metabolic Panel; Future  -     Hemoglobin A1c; Future  -     POCT glycated hemoglobin, total    3. Mixed hyperlipidemia  Comments:  Continue rosuvastatin  Advised low-cholesterol diet  Orders:  -     rosuvastatin (CRESTOR) 20 MG tablet; Take 1 tablet by mouth Daily.  Dispense: 90 tablet; Refill: 3  -     Lipid Panel; Future    4. B12 deficiency  -     cyanocobalamin injection 1,000 mcg      Procedure: Bilateral intra-articular shoulder joint injections  Provider: Arianna Kim PA-C  Indication: Shoulder pain secondary to osteoarthritis bilateral  Timeout was taken to verify correct patient procedure and location  Description: The left and right shoulder joints were prepped and draped in sterile fashion.  An intra-articular injection was given into each shoulder joint using 3 cc 1% lidocaine, 1 cc of triamcinolone and 1 cc of Depo-Medrol.  Pressure was  held and sterile dressings were placed.  No complications  Estimated blood loss: Minimal  Patient tolerated the procedures well    Lidocaine 1% 10 mg/mL  NDC number 18530-145-10  Lot #[id number]  Expiration 2/26    Triamcinolone 40 mg/mL  NDC number 629005-9439354709-1473-4  Lot number [id number]  Expiration 7/2025    Depo-Medrol 80 mg/mL  NDC number 3324-7270-82  Lot #[id number]  Expiration 5/2025             Patient was given instructions and counseling regarding his condition or for health maintenance advice. Please see specific information pulled into the AVS if appropriate      This document has been electronically signed by:  Arianna Kim PA-C

## 2023-12-13 ENCOUNTER — TELEPHONE (OUTPATIENT)
Dept: FAMILY MEDICINE CLINIC | Facility: CLINIC | Age: 53
End: 2023-12-13

## 2023-12-13 NOTE — TELEPHONE ENCOUNTER
Caller: Kwadwo Franks    Relationship: Self    Best call back number: [phone]    Which medication are you concerned about: Tirzepatide (Mounjaro) 7.5 MG/0.5ML solution pen-injector     Who prescribed you this medication: PREET ISSA    When did you start taking this medication: SEVERAL MONTHS    What are your concerns: HAS BEEN WAITING ON PRIOR AUTHORIZATION FOR SOME TIME    How long have you had these concerns: HE HASN'T HAD MEDICATION FOR QUITE SOME TIME AND HE IS CONCERNED ABOUT SUGAR.    [facility] [place], [place] - [address] - [phone]  - [phone] FX

## 2024-01-05 NOTE — TELEPHONE ENCOUNTER
Faxed additional clinicals including A1c of 7.0 and glucose of greater than 128 to insurance for patient's Mounjaro PA. Pt notified via [email or web address].

## 2024-01-05 NOTE — TELEPHONE ENCOUNTER
Caller: Kwadwo Franks    Relationship: Self    Best call back number: [phone]     What is the best time to reach you: ANY    Who are you requesting to speak with (clinical staff, provider,  specific staff member): NURSE    Do you know the name of the person who called: PATIENT    What was the call regarding: PATIENT CALLED ABOUT PA FOR MOUNJARO.  INSURANCE SENT FAX THAT THEY NEEDED OFFICE NOTES WITH A1C HIGHER THAN 6.5 AND FASTING GLUCOSE OVER 128 AND THAT HE HAS TYPE II DIABETES.      Is it okay if the provider responds through MyChart: PHONE CALL PLEASE

## 2024-02-22 ENCOUNTER — LAB (OUTPATIENT)
Dept: FAMILY MEDICINE CLINIC | Facility: CLINIC | Age: 54
End: 2024-02-22
Payer: COMMERCIAL

## 2024-02-22 DIAGNOSIS — E78.2 MIXED HYPERLIPIDEMIA: Chronic | ICD-10-CM

## 2024-02-22 DIAGNOSIS — E11.9 TYPE 2 DIABETES MELLITUS WITHOUT COMPLICATION, WITHOUT LONG-TERM CURRENT USE OF INSULIN: Chronic | ICD-10-CM

## 2024-02-22 LAB
ALBUMIN SERPL-MCNC: 4.2 G/DL (ref 3.5–5.2)
ALBUMIN/GLOB SERPL: 1.6 G/DL
ALP SERPL-CCNC: 55 U/L (ref 39–117)
ALT SERPL W P-5'-P-CCNC: 20 U/L (ref 1–41)
ANION GAP SERPL CALCULATED.3IONS-SCNC: 12 MMOL/L (ref 5–15)
AST SERPL-CCNC: 20 U/L (ref 1–40)
BILIRUB SERPL-MCNC: 0.4 MG/DL (ref 0–1.2)
BUN SERPL-MCNC: 9 MG/DL (ref 6–20)
BUN/CREAT SERPL: 11.5 (ref 7–25)
CALCIUM SPEC-SCNC: 9 MG/DL (ref 8.6–10.5)
CHLORIDE SERPL-SCNC: 103 MMOL/L (ref 98–107)
CHOLEST SERPL-MCNC: 165 MG/DL (ref 0–200)
CO2 SERPL-SCNC: 24 MMOL/L (ref 22–29)
CREAT SERPL-MCNC: 0.78 MG/DL (ref 0.76–1.27)
EGFRCR SERPLBLD CKD-EPI 2021: 106 ML/MIN/1.73
GLOBULIN UR ELPH-MCNC: 2.6 GM/DL
GLUCOSE SERPL-MCNC: 112 MG/DL (ref 65–99)
HBA1C MFR BLD: 6.5 % (ref 4.8–5.6)
HDLC SERPL-MCNC: 36 MG/DL (ref 40–60)
LDLC SERPL CALC-MCNC: 108 MG/DL (ref 0–100)
LDLC/HDLC SERPL: 2.94 {RATIO}
POTASSIUM SERPL-SCNC: 3.6 MMOL/L (ref 3.5–5.2)
PROT SERPL-MCNC: 6.8 G/DL (ref 6–8.5)
SODIUM SERPL-SCNC: 139 MMOL/L (ref 136–145)
TRIGL SERPL-MCNC: 116 MG/DL (ref 0–150)
VLDLC SERPL-MCNC: 21 MG/DL (ref 5–40)

## 2024-02-22 PROCEDURE — 80053 COMPREHEN METABOLIC PANEL: CPT | Performed by: PHYSICIAN ASSISTANT

## 2024-02-22 PROCEDURE — 36415 COLL VENOUS BLD VENIPUNCTURE: CPT

## 2024-02-22 PROCEDURE — 83036 HEMOGLOBIN GLYCOSYLATED A1C: CPT | Performed by: PHYSICIAN ASSISTANT

## 2024-02-22 PROCEDURE — 80061 LIPID PANEL: CPT | Performed by: PHYSICIAN ASSISTANT

## 2024-02-27 ENCOUNTER — OFFICE VISIT (OUTPATIENT)
Dept: FAMILY MEDICINE CLINIC | Facility: CLINIC | Age: 54
End: 2024-02-27
Payer: COMMERCIAL

## 2024-02-27 VITALS
DIASTOLIC BLOOD PRESSURE: 90 MMHG | BODY MASS INDEX: 36.31 KG/M2 | SYSTOLIC BLOOD PRESSURE: 142 MMHG | TEMPERATURE: 98.8 F | WEIGHT: 274 LBS | HEIGHT: 73 IN | HEART RATE: 81 BPM | OXYGEN SATURATION: 98 %

## 2024-02-27 DIAGNOSIS — I10 PRIMARY HYPERTENSION: Chronic | ICD-10-CM

## 2024-02-27 DIAGNOSIS — M19.011 PRIMARY OSTEOARTHRITIS OF BOTH SHOULDERS: Chronic | ICD-10-CM

## 2024-02-27 DIAGNOSIS — M19.012 PRIMARY OSTEOARTHRITIS OF BOTH SHOULDERS: Chronic | ICD-10-CM

## 2024-02-27 DIAGNOSIS — E11.9 TYPE 2 DIABETES MELLITUS WITHOUT COMPLICATION, WITHOUT LONG-TERM CURRENT USE OF INSULIN: Primary | Chronic | ICD-10-CM

## 2024-02-27 DIAGNOSIS — E53.8 B12 DEFICIENCY: Chronic | ICD-10-CM

## 2024-02-27 DIAGNOSIS — E11.9 TYPE 2 DIABETES MELLITUS WITHOUT COMPLICATION, WITHOUT LONG-TERM CURRENT USE OF INSULIN: ICD-10-CM

## 2024-02-27 RX ADMIN — CYANOCOBALAMIN 1000 MCG: 1000 INJECTION, SOLUTION INTRAMUSCULAR; SUBCUTANEOUS at 08:45

## 2024-02-27 NOTE — PROGRESS NOTES
"Chief Complaint -diabetes       History of Present Illness -     Kwadwo Franks is a 53 y.o. male.     Diabetes-  Not at goal as patient had been out of his Mounjaro for 3 months.  He gained back 18 pounds while off of Mounjaro.  Patient reports he did start back on Mounjaro 7.5 mg 3 weeks ago.  He is tolerating it without side effects.    Hypertension-  Likely elevated today due to pain secondary to osteoarthritis in the shoulders.  Plan to treat underlying cause of pain    Shoulder pain-  Patient complains of moderate to severe achy pain in bilateral shoulders due to osteoarthritis.  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    B12 deficiency-  Stable with B12 supplementation      The following portions of the patient's history were reviewed and updated as appropriate: allergies, current medications, past family history, past medical history, past social history, past surgical history and problem list.        Objective  Vital signs:  /90   Pulse 81   Temp 98.8 °F (37.1 °C) (Temporal)   Ht 185.4 cm (73\")   Wt 124 kg (274 lb)   SpO2 98%   BMI 36.15 kg/m²     Physical Exam  Vitals and nursing note reviewed.   Constitutional:       Appearance: Normal appearance. He is well-developed. He is obese.   Eyes:      Extraocular Movements: Extraocular movements intact.      Conjunctiva/sclera: Conjunctivae normal.   Cardiovascular:      Rate and Rhythm: Normal rate and regular rhythm.      Heart sounds: Normal heart sounds. No murmur heard.  Pulmonary:      Effort: Pulmonary effort is normal. No respiratory distress.      Breath sounds: Normal breath sounds. No wheezing.   Musculoskeletal:         General: Tenderness present.      Comments: Coarse crepitus noted in bilateral shoulders with manipulation   Skin:     General: Skin is warm and dry.      Findings: No rash.   Neurological:      Mental Status: He is alert and oriented to person, place, and time.   Psychiatric:         " Mood and Affect: Mood normal.         Behavior: Behavior normal.         Thought Content: Thought content normal.         The following data was reviewed by Arianna Kim PA-C:         Lab Results   Component Value Date    BUN 9 02/22/2024    CREATININE 0.78 02/22/2024    EGFR 106.0 02/22/2024    ALT 20 02/22/2024    AST 20 02/22/2024    CHOL 165 02/22/2024    TRIG 116 02/22/2024    HDL 36 (L) 02/22/2024     (H) 02/22/2024    TSH 2.480 12/27/2021    HGBA1C 6.50 (H) 02/22/2024           Assessment & Plan     Diagnoses and all orders for this visit:    1. Type 2 diabetes mellitus without complication, without long-term current use of insulin (Primary)  Comments:  Continue Mounjaro 7.5 mg weekly  Advise low carbohydrate diabetic diet  Orders:  -     Tirzepatide (Mounjaro) 7.5 MG/0.5ML solution pen-injector pen; Inject 2 mL under the skin into the appropriate area as directed Every 7 (Seven) Days.  Dispense: 2 mL; Refill: 3    2. Type 2 diabetes mellitus without complication, without long-term current use of insulin  -     Tirzepatide (Mounjaro) 7.5 MG/0.5ML solution pen-injector pen; Inject 2 mL under the skin into the appropriate area as directed Every 7 (Seven) Days.  Dispense: 2 mL; Refill: 3    3. Primary hypertension  Comments:  Likely elevated today due to pain  Continue to monitor    4. Primary osteoarthritis of both shoulders  Comments:  Bilateral intra-articular shoulder injections given today.  Advised activity as tolerated and daily stretching    5. B12 deficiency  Comments:  Continue B12 supplementation      Procedure: Bilateral intra-articular shoulder joint injections today  Provider: Arianna Kim PA-C  Indication: Bilateral shoulder pain secondary to osteoarthritis  Description: The left and right shoulder joints were prepped and draped in sterile fashion.  An intra-articular injection was given into each shoulder joint using 3 cc 1% lidocaine 1 cc of Depo-Medrol and 1 cc of triamcinolone.  Pressure  was held and sterile dressing was placed.  No complications  Estimated blood loss: Minimal  Patient tolerated the procedure well    1% lidocaine 10 mg/mL  NDC number 5601-5761-24  Lot number [id number]  Expiration July 1, 2025    Depo-Medrol 80 mg/mL  NDC number 5556-1403-58  Lot number [id number]  Expiration 12/25    Triamcinolone 40 mg/mL  NDC number 0225-7811-88  Lot #[id number]  Expiration January 2026               Patient was given instructions and counseling regarding his condition or for health maintenance advice. Please see specific information pulled into the AVS if appropriate      This document has been electronically signed by:  Arianna Kim PA-C

## 2024-02-27 NOTE — PATIENT INSTRUCTIONS
Joint Steroid Injection  A joint steroid injection is a procedure to relieve swelling and pain in a joint. Steroids are medicines that reduce inflammation. In this procedure, your health care provider uses a syringe and a needle to inject a steroid medicine into a painful and inflamed joint. A pain-relieving medicine (anesthetic) may be injected along with the steroid. In some cases, your health care provider may use an imaging technique such as ultrasound or fluoroscopy to guide the injection.  Joints that are often treated with steroid injections include the knee, shoulder, hip, and spine. These injections may also be used in the elbow, ankle, and joints of the hands or feet. You may have joint steroid injections as part of your treatment for inflammation caused by:  Gout.  Rheumatoid arthritis.  Advanced wear-and-tear arthritis (osteoarthritis).  Tendinitis.  Bursitis.  Joint steroid injections may be repeated, but having them too often can damage a joint or the skin over the joint. You should not have joint steroid injections less than 6 weeks apart or more than four times a year.  Tell a health care provider about:  Any allergies you have.  All medicines you are taking, including vitamins, herbs, eye drops, creams, and over-the-counter medicines.  Any problems you or family members have had with anesthetic medicines.  Any blood disorders you have.  Any surgeries you have had.  Any medical conditions you have.  Whether you are pregnant or may be pregnant.  What are the risks?  Generally, this is a safe treatment. However, problems may occur, including:  Infection.  Bleeding.  Allergic reactions to medicines.  Damage to the joint or tissues around the joint.  Thinning of skin or loss of skin color over the joint.  Temporary flushing of the face or chest.  Temporary increase in pain.  Temporary increase in blood sugar.  Failure to relieve inflammation or pain.  What happens before the treatment?  Medicines  Ask  your health care provider about:  Changing or stopping your regular medicines. This is especially important if you are taking diabetes medicines or blood thinners.  Taking medicines such as aspirin and ibuprofen. These medicines can thin your blood. Do not take these medicines unless your health care provider tells you to take them.  Taking over-the-counter medicines, vitamins, herbs, and supplements.  General instructions  You may have imaging tests of your joint.  Ask your health care provider if you can drive yourself home after the procedure.  What happens during the treatment?    Your health care provider will position you for the injection and locate the injection site over your joint.  The skin over the joint will be cleaned with a germ-killing soap.  Your health care provider may:  Spray a numbing solution (topical anesthetic) over the injection site.  Inject a local anesthetic under the skin above your joint.  The needle will be placed through your skin into your joint. Your health care provider may use imaging to guide the needle to the right spot for the injection. If imaging is used, a special contrast dye may be injected to confirm that the needle is in the correct location.  The steroid medicine will be injected into your joint.  Anesthetic may be injected along with the steroid. This may be a medicine that relieves pain for a short time (short-acting anesthetic) or for a longer time (long-acting anesthetic).  The needle will be removed, and an adhesive bandage (dressing) will be placed over the injection site.  The procedure may vary among health care providers and hospitals.  What can I expect after the treatment?  You will be able to go home after the treatment.  It is normal to feel slight flushing for a few days after the injection.  After the treatment, it is common to have an increase in joint pain after the anesthetic has worn off. This may happen about an hour after a short-acting anesthetic  or about 8 hours after a longer-acting anesthetic.  You should begin to feel relief from joint pain and swelling after 24 to 48 hours. Contact your health care provider if you do not begin to feel relief after 2 days.  Follow these instructions at home:  Injection site care  Leave the adhesive dressing over your injection site in place until your health care provider says you can remove it.  Check your injection site every day for signs of infection. Check for:  More redness, swelling, or pain.  Fluid or blood.  Warmth.  Pus or a bad smell.  Activity  Return to your normal activities as told by your health care provider. Ask your health care provider what activities are safe for you. You may be asked to limit activities that put stress on the joint for a few days.  Do joint exercises as told by your health care provider.  Do not take baths, swim, or use a hot tub until your health care provider approves. Ask your health care provider if you may take showers. You may only be allowed to take sponge baths.  Managing pain, stiffness, and swelling    If directed, put ice on the joint. To do this:  Put ice in a plastic bag.  Place a towel between your skin and the bag.  Leave the ice on for 20 minutes, 2-3 times a day.  Remove the ice if your skin turns bright red. This is very important. If you cannot feel pain, heat, or cold, you have a greater risk of damage to the area.  Raise (elevate) your joint above the level of your heart when you are sitting or lying down.  General instructions  Take over-the-counter and prescription medicines only as told by your health care provider.  Do not use any products that contain nicotine or tobacco, such as cigarettes, e-cigarettes, and chewing tobacco. These can delay joint healing. If you need help quitting, ask your health care provider.  If you have diabetes, be aware that your blood sugar may be slightly elevated for several days after the injection.  Keep all follow-up visits.  This is important.  Contact a health care provider if you have:  Chills or a fever.  Any signs of infection at your injection site.  Increased pain or swelling or no relief after 2 days.  Summary  A joint steroid injection is a treatment to relieve pain and swelling in a joint.  Steroids are medicines that reduce inflammation. Your health care provider may add an anesthetic along with the steroid.  You may have joint steroid injections as part of your arthritis treatment.  Joint steroid injections may be repeated, but having them too often can damage a joint or the skin over the joint.  Contact your health care provider if you have a fever, chills, or signs of infection, or if you get no relief from joint pain or swelling.  This information is not intended to replace advice given to you by your health care provider. Make sure you discuss any questions you have with your health care provider.  Document Revised: 05/28/2021 Document Reviewed: 05/28/2021  [employer] Patient Education © 2023 [employer]  Carbohydrate Counting for Diabetes Mellitus, Adult  Carbohydrate counting is a method of keeping track of how many carbohydrates you eat. Eating carbohydrates increases the amount of sugar (glucose) in the blood. Counting how many carbohydrates you eat improves how well you manage your blood glucose. This, in turn, helps you manage your diabetes.  Carbohydrates are measured in grams (g) per serving. It is important to know how many carbohydrates (in grams or by serving size) you can have in each meal. This is different for every person. A dietitian can help you make a meal plan and calculate how many carbohydrates you should have at each meal and snack.  What foods contain carbohydrates?  Carbohydrates are found in the following foods:  Grains, such as breads and cereals.  Dried beans and soy products.  Starchy vegetables, such as potatoes, peas, and corn.  Fruit and fruit juices.  Milk and yogurt.  Sweets and snack  foods, such as cake, cookies, candy, chips, and soft drinks.  How do I count carbohydrates in foods?  There are two ways to count carbohydrates in food. You can read food labels or learn standard serving sizes of foods. You can use either of these methods or a combination of both.  Using the Nutrition Facts label  The Nutrition Facts list is included on the labels of almost all packaged foods and beverages in the [place]. It includes:  The serving size.  Information about nutrients in each serving, including the grams of carbohydrate per serving.  To use the Nutrition Facts, decide how many servings you will have. Then, multiply the number of servings by the number of carbohydrates per serving. The resulting number is the total grams of carbohydrates that you will be having.  Learning the standard serving sizes of foods  When you eat carbohydrate foods that are not packaged or do not include Nutrition Facts on the label, you need to measure the servings in order to count the grams of carbohydrates.  Measure the foods that you will eat with a food scale or measuring cup, if needed.  Decide how many standard-size servings you will eat.  Multiply the number of servings by 15. For foods that contain carbohydrates, one serving equals 15 g of carbohydrates.  For example, if you eat 2 cups or 10 oz (300 g) of strawberries, you will have eaten 2 servings and 30 g of carbohydrates (2 servings x 15 g = 30 g).  For foods that have more than one food mixed, such as soups and casseroles, you must count the carbohydrates in each food that is included.  The following list contains standard serving sizes of common carbohydrate-rich foods. Each of these servings has about 15 g of carbohydrates:  1 slice of bread.  1 six-inch (15 cm) tortilla.  ? cup or 2 oz (53 g) cooked rice or pasta.  ½ cup or 3 oz (85 g) cooked or canned, drained and rinsed beans or lentils.  ½ cup or 3 oz (85 g) starchy vegetable, such as peas, corn, or  squash.  ½ cup or 4 oz (120 g) hot cereal.  ½ cup or 3 oz (85 g) boiled or mashed potatoes, or ¼ or 3 oz (85 g) of a large baked potato.  ½ cup or 4 fl oz (118 mL) fruit juice.  1 cup or 8 fl oz (237 mL) milk.  1 small or 4 oz (106 g) apple.  ½ or 2 oz (63 g) of a medium banana.  1 cup or 5 oz (150 g) strawberries.  3 cups or 1 oz (28.3 g) popped popcorn.  What is an example of carbohydrate counting?  To calculate the grams of carbohydrates in this sample meal, follow the steps shown below.  Sample meal  3 oz (85 g) chicken breast.  ? cup or 4 oz (106 g) brown rice.  ½ cup or 3 oz (85 g) corn.  1 cup or 8 fl oz (237 mL) milk.  1 cup or 5 oz (150 g) strawberries with sugar-free whipped topping.  Carbohydrate calculation  Identify the foods that contain carbohydrates:  Rice.  Corn.  Milk.  Strawberries.  Calculate how many servings you have of each food:  2 servings rice.  1 serving corn.  1 serving milk.  1 serving strawberries.  Multiply each number of servings by 15  servings rice x 15 g = 30 g.  1 serving corn x 15 g = 15 g.  1 serving milk x 15 g = 15 g.  1 serving strawberries x 15 g = 15 g.  Add together all of the amounts to find the total grams of carbohydrates eaten:  30 g + 15 g + 15 g + 15 g = 75 g of carbohydrates total.  What are tips for following this plan?  Shopping  Develop a meal plan and then make a shopping list.  Buy fresh and frozen vegetables, fresh and frozen fruit, dairy, eggs, beans, lentils, and whole grains.  Look at food labels. Choose foods that have more fiber and less sugar.  Avoid processed foods and foods with added sugars.  Meal planning  Aim to have the same number of grams of carbohydrates at each meal and for each snack time.  Plan to have regular, balanced meals and snacks.  Where to find more information  American Diabetes Association: diabetes.org  Centers for Disease Control and Prevention: cdc.gov  Academy of Nutrition and Dietetics: eatright.org  Association of  Diabetes Care & Education Specialists: diabeteseducator.org  Summary  Carbohydrate counting is a method of keeping track of how many carbohydrates you eat.  Eating carbohydrates increases the amount of sugar (glucose) in your blood.  Counting how many carbohydrates you eat improves how well you manage your blood glucose. This helps you manage your diabetes.  A dietitian can help you make a meal plan and calculate how many carbohydrates you should have at each meal and snack.  This information is not intended to replace advice given to you by your health care provider. Make sure you discuss any questions you have with your health care provider.  Document Revised: 07/21/2021 Document Reviewed: 07/21/2021  [employer] Patient Education © 2023 [employer]

## 2024-03-08 RX ORDER — LEVOFLOXACIN 500 MG/1
500 TABLET, FILM COATED ORAL DAILY
Qty: 7 TABLET | Refills: 0 | Status: SHIPPED | OUTPATIENT
Start: 2024-03-08

## 2024-03-11 ENCOUNTER — TELEPHONE (OUTPATIENT)
Dept: FAMILY MEDICINE CLINIC | Facility: CLINIC | Age: 54
End: 2024-03-11
Payer: COMMERCIAL

## 2024-03-11 NOTE — TELEPHONE ENCOUNTER
Left patient a voicemail letting him know that this has been sent in.    ----- Message from TUAN Chris sent at 3/8/2024  4:52 PM EST -----   Inform the patient that I sent in [facility] to his pharmacy for his respiratory illness.

## 2024-04-12 DIAGNOSIS — E11.9 TYPE 2 DIABETES MELLITUS WITHOUT COMPLICATION, WITHOUT LONG-TERM CURRENT USE OF INSULIN: Primary | ICD-10-CM

## 2024-04-18 ENCOUNTER — LAB (OUTPATIENT)
Dept: FAMILY MEDICINE CLINIC | Facility: CLINIC | Age: 54
End: 2024-04-18
Payer: COMMERCIAL

## 2024-04-18 DIAGNOSIS — E11.9 TYPE 2 DIABETES MELLITUS WITHOUT COMPLICATION, WITHOUT LONG-TERM CURRENT USE OF INSULIN: ICD-10-CM

## 2024-04-18 PROCEDURE — 82043 UR ALBUMIN QUANTITATIVE: CPT | Performed by: PHYSICIAN ASSISTANT

## 2024-04-19 LAB — ALBUMIN UR-MCNC: 1.4 MG/DL

## 2024-04-23 ENCOUNTER — OFFICE VISIT (OUTPATIENT)
Dept: FAMILY MEDICINE CLINIC | Facility: CLINIC | Age: 54
End: 2024-04-23
Payer: COMMERCIAL

## 2024-04-23 VITALS
DIASTOLIC BLOOD PRESSURE: 90 MMHG | BODY MASS INDEX: 35.78 KG/M2 | SYSTOLIC BLOOD PRESSURE: 150 MMHG | TEMPERATURE: 98.3 F | OXYGEN SATURATION: 98 % | HEIGHT: 73 IN | HEART RATE: 102 BPM | WEIGHT: 270 LBS

## 2024-04-23 DIAGNOSIS — E53.8 B12 DEFICIENCY: ICD-10-CM

## 2024-04-23 DIAGNOSIS — M19.012 PRIMARY OSTEOARTHRITIS OF BOTH SHOULDERS: Primary | Chronic | ICD-10-CM

## 2024-04-23 DIAGNOSIS — I10 PRIMARY HYPERTENSION: Chronic | ICD-10-CM

## 2024-04-23 DIAGNOSIS — M19.011 PRIMARY OSTEOARTHRITIS OF BOTH SHOULDERS: Primary | Chronic | ICD-10-CM

## 2024-04-23 DIAGNOSIS — J30.1 ALLERGIC RHINITIS DUE TO POLLEN, UNSPECIFIED SEASONALITY: Chronic | ICD-10-CM

## 2024-04-23 DIAGNOSIS — E11.9 TYPE 2 DIABETES MELLITUS WITHOUT COMPLICATION, WITHOUT LONG-TERM CURRENT USE OF INSULIN: Chronic | ICD-10-CM

## 2024-04-23 RX ORDER — MONTELUKAST SODIUM 10 MG/1
10 TABLET ORAL NIGHTLY
Qty: 30 TABLET | Refills: 5 | Status: SHIPPED | OUTPATIENT
Start: 2024-04-23

## 2024-04-23 RX ORDER — SEMAGLUTIDE 1.34 MG/ML
0.25 INJECTION, SOLUTION SUBCUTANEOUS WEEKLY
Qty: 3 ML | Refills: 0 | COMMUNITY
Start: 2024-04-23

## 2024-04-23 RX ORDER — CYANOCOBALAMIN 1000 UG/ML
1000 INJECTION, SOLUTION INTRAMUSCULAR; SUBCUTANEOUS ONCE
Status: COMPLETED | OUTPATIENT
Start: 2024-04-23 | End: 2024-04-23

## 2024-04-23 RX ORDER — SEMAGLUTIDE 1.34 MG/ML
0.5 INJECTION, SOLUTION SUBCUTANEOUS WEEKLY
Qty: 1.5 ML | Refills: 2 | Status: SHIPPED | OUTPATIENT
Start: 2024-04-23

## 2024-04-23 RX ADMIN — CYANOCOBALAMIN 1000 MCG: 1000 INJECTION, SOLUTION INTRAMUSCULAR; SUBCUTANEOUS at 16:23

## 2024-04-23 NOTE — PATIENT INSTRUCTIONS
Joint Steroid Injection  A joint steroid injection is a procedure to relieve swelling and pain in a joint. Steroids are medicines that reduce inflammation. In this procedure, your health care provider uses a syringe and a needle to inject a steroid medicine into a painful and inflamed joint. A pain-relieving medicine (anesthetic) may be injected along with the steroid. In some cases, your health care provider may use an imaging technique such as ultrasound or fluoroscopy to guide the injection.  Joints that are often treated with steroid injections include the knee, shoulder, hip, and spine. These injections may also be used in the elbow, ankle, and joints of the hands or feet. You may have joint steroid injections as part of your treatment for inflammation caused by:  Gout.  Rheumatoid arthritis.  Advanced wear-and-tear arthritis (osteoarthritis).  Tendinitis.  Bursitis.  Joint steroid injections may be repeated, but having them too often can damage a joint or the skin over the joint. You should not have joint steroid injections less than 6 weeks apart or more than four times a year.  Tell a health care provider about:  Any allergies you have.  All medicines you are taking, including vitamins, herbs, eye drops, creams, and over-the-counter medicines.  Any problems you or family members have had with anesthetic medicines.  Any blood disorders you have.  Any surgeries you have had.  Any medical conditions you have.  Whether you are pregnant or may be pregnant.  What are the risks?  Generally, this is a safe treatment. However, problems may occur, including:  Infection.  Bleeding.  Allergic reactions to medicines.  Damage to the joint or tissues around the joint.  Thinning of skin or loss of skin color over the joint.  Temporary flushing of the face or chest.  Temporary increase in pain.  Temporary increase in blood sugar.  Failure to relieve inflammation or pain.  What happens before the treatment?  Medicines  Ask  your health care provider about:  Changing or stopping your regular medicines. This is especially important if you are taking diabetes medicines or blood thinners.  Taking medicines such as aspirin and ibuprofen. These medicines can thin your blood. Do not take these medicines unless your health care provider tells you to take them.  Taking over-the-counter medicines, vitamins, herbs, and supplements.  General instructions  You may have imaging tests of your joint.  Ask your health care provider if you can drive yourself home after the procedure.  What happens during the treatment?    Your health care provider will position you for the injection and locate the injection site over your joint.  The skin over the joint will be cleaned with a germ-killing soap.  Your health care provider may:  Spray a numbing solution (topical anesthetic) over the injection site.  Inject a local anesthetic under the skin above your joint.  The needle will be placed through your skin into your joint. Your health care provider may use imaging to guide the needle to the right spot for the injection. If imaging is used, a special contrast dye may be injected to confirm that the needle is in the correct location.  The steroid medicine will be injected into your joint.  Anesthetic may be injected along with the steroid. This may be a medicine that relieves pain for a short time (short-acting anesthetic) or for a longer time (long-acting anesthetic).  The needle will be removed, and an adhesive bandage (dressing) will be placed over the injection site.  The procedure may vary among health care providers and hospitals.  What can I expect after the treatment?  You will be able to go home after the treatment.  It is normal to feel slight flushing for a few days after the injection.  After the treatment, it is common to have an increase in joint pain after the anesthetic has worn off. This may happen about an hour after a short-acting anesthetic  or about 8 hours after a longer-acting anesthetic.  You should begin to feel relief from joint pain and swelling after 24 to 48 hours. Contact your health care provider if you do not begin to feel relief after 2 days.  Follow these instructions at home:  Injection site care  Leave the adhesive dressing over your injection site in place until your health care provider says you can remove it.  Check your injection site every day for signs of infection. Check for:  More redness, swelling, or pain.  Fluid or blood.  Warmth.  Pus or a bad smell.  Activity  Return to your normal activities as told by your health care provider. Ask your health care provider what activities are safe for you. You may be asked to limit activities that put stress on the joint for a few days.  Do joint exercises as told by your health care provider.  Do not take baths, swim, or use a hot tub until your health care provider approves. Ask your health care provider if you may take showers. You may only be allowed to take sponge baths.  Managing pain, stiffness, and swelling    If directed, put ice on the joint. To do this:  Put ice in a plastic bag.  Place a towel between your skin and the bag.  Leave the ice on for 20 minutes, 2-3 times a day.  Remove the ice if your skin turns bright red. This is very important. If you cannot feel pain, heat, or cold, you have a greater risk of damage to the area.  Raise (elevate) your joint above the level of your heart when you are sitting or lying down.  General instructions  Take over-the-counter and prescription medicines only as told by your health care provider.  Do not use any products that contain nicotine or tobacco, such as cigarettes, e-cigarettes, and chewing tobacco. These can delay joint healing. If you need help quitting, ask your health care provider.  If you have diabetes, be aware that your blood sugar may be slightly elevated for several days after the injection.  Keep all follow-up visits.  This is important.  Contact a health care provider if you have:  Chills or a fever.  Any signs of infection at your injection site.  Increased pain or swelling or no relief after 2 days.  Summary  A joint steroid injection is a treatment to relieve pain and swelling in a joint.  Steroids are medicines that reduce inflammation. Your health care provider may add an anesthetic along with the steroid.  You may have joint steroid injections as part of your arthritis treatment.  Joint steroid injections may be repeated, but having them too often can damage a joint or the skin over the joint.  Contact your health care provider if you have a fever, chills, or signs of infection, or if you get no relief from joint pain or swelling.  This information is not intended to replace advice given to you by your health care provider. Make sure you discuss any questions you have with your health care provider.  Document Revised: 05/28/2021 Document Reviewed: 05/28/2021  [employer] Patient Education © 2024 [employer]  Carbohydrate Counting for Diabetes Mellitus, Adult  Carbohydrate counting is a method of keeping track of how many carbohydrates you eat. Eating carbohydrates increases the amount of sugar (glucose) in the blood. Counting how many carbohydrates you eat improves how well you manage your blood glucose. This, in turn, helps you manage your diabetes.  Carbohydrates are measured in grams (g) per serving. It is important to know how many carbohydrates (in grams or by serving size) you can have in each meal. This is different for every person. A dietitian can help you make a meal plan and calculate how many carbohydrates you should have at each meal and snack.  What foods contain carbohydrates?  Carbohydrates are found in the following foods:  Grains, such as breads and cereals.  Dried beans and soy products.  Starchy vegetables, such as potatoes, peas, and corn.  Fruit and fruit juices.  Milk and yogurt.  Sweets and snack  foods, such as cake, cookies, candy, chips, and soft drinks.  How do I count carbohydrates in foods?  There are two ways to count carbohydrates in food. You can read food labels or learn standard serving sizes of foods. You can use either of these methods or a combination of both.  Using the Nutrition Facts label  The Nutrition Facts list is included on the labels of almost all packaged foods and beverages in the [place]. It includes:  The serving size.  Information about nutrients in each serving, including the grams of carbohydrate per serving.  To use the Nutrition Facts, decide how many servings you will have. Then, multiply the number of servings by the number of carbohydrates per serving. The resulting number is the total grams of carbohydrates that you will be having.  Learning the standard serving sizes of foods  When you eat carbohydrate foods that are not packaged or do not include Nutrition Facts on the label, you need to measure the servings in order to count the grams of carbohydrates.  Measure the foods that you will eat with a food scale or measuring cup, if needed.  Decide how many standard-size servings you will eat.  Multiply the number of servings by 15. For foods that contain carbohydrates, one serving equals 15 g of carbohydrates.  For example, if you eat 2 cups or 10 oz (300 g) of strawberries, you will have eaten 2 servings and 30 g of carbohydrates (2 servings x 15 g = 30 g).  For foods that have more than one food mixed, such as soups and casseroles, you must count the carbohydrates in each food that is included.  The following list contains standard serving sizes of common carbohydrate-rich foods. Each of these servings has about 15 g of carbohydrates:  1 slice of bread.  1 six-inch (15 cm) tortilla.  ? cup or 2 oz (53 g) cooked rice or pasta.  ½ cup or 3 oz (85 g) cooked or canned, drained and rinsed beans or lentils.  ½ cup or 3 oz (85 g) starchy vegetable, such as peas, corn, or  squash.  ½ cup or 4 oz (120 g) hot cereal.  ½ cup or 3 oz (85 g) boiled or mashed potatoes, or ¼ or 3 oz (85 g) of a large baked potato.  ½ cup or 4 fl oz (118 mL) fruit juice.  1 cup or 8 fl oz (237 mL) milk.  1 small or 4 oz (106 g) apple.  ½ or 2 oz (63 g) of a medium banana.  1 cup or 5 oz (150 g) strawberries.  3 cups or 1 oz (28.3 g) popped popcorn.  What is an example of carbohydrate counting?  To calculate the grams of carbohydrates in this sample meal, follow the steps shown below.  Sample meal  3 oz (85 g) chicken breast.  ? cup or 4 oz (106 g) brown rice.  ½ cup or 3 oz (85 g) corn.  1 cup or 8 fl oz (237 mL) milk.  1 cup or 5 oz (150 g) strawberries with sugar-free whipped topping.  Carbohydrate calculation  Identify the foods that contain carbohydrates:  Rice.  Corn.  Milk.  Strawberries.  Calculate how many servings you have of each food:  2 servings rice.  1 serving corn.  1 serving milk.  1 serving strawberries.  Multiply each number of servings by 15  servings rice x 15 g = 30 g.  1 serving corn x 15 g = 15 g.  1 serving milk x 15 g = 15 g.  1 serving strawberries x 15 g = 15 g.  Add together all of the amounts to find the total grams of carbohydrates eaten:  30 g + 15 g + 15 g + 15 g = 75 g of carbohydrates total.  What are tips for following this plan?  Shopping  Develop a meal plan and then make a shopping list.  Buy fresh and frozen vegetables, fresh and frozen fruit, dairy, eggs, beans, lentils, and whole grains.  Look at food labels. Choose foods that have more fiber and less sugar.  Avoid processed foods and foods with added sugars.  Meal planning  Aim to have the same number of grams of carbohydrates at each meal and for each snack time.  Plan to have regular, balanced meals and snacks.  Where to find more information  American Diabetes Association: diabetes.org  Centers for Disease Control and Prevention: cdc.gov  Academy of Nutrition and Dietetics: eatright.org  Association of  Diabetes Care & Education Specialists: diabeteseducator.org  Summary  Carbohydrate counting is a method of keeping track of how many carbohydrates you eat.  Eating carbohydrates increases the amount of sugar (glucose) in your blood.  Counting how many carbohydrates you eat improves how well you manage your blood glucose. This helps you manage your diabetes.  A dietitian can help you make a meal plan and calculate how many carbohydrates you should have at each meal and snack.  This information is not intended to replace advice given to you by your health care provider. Make sure you discuss any questions you have with your health care provider.  Document Revised: 07/21/2021 Document Reviewed: 07/21/2021  [employer] Patient Education © 2024 [employer]

## 2024-04-23 NOTE — PROGRESS NOTES
"Chief Complaint -shoulder pain    History of Present Illness -     Kwadwo Franks is a 53 y.o. male.     Shoulder pain-  Patient complains of moderate to severe achy dull pain in bilateral shoulders secondary to osteoarthritis.  Worse with repetitive movements.    B12 deficiency-  Stable with B12 supplementation    Diabetes mellitus-  Not at goal as patient has been out of [facility] due to a shortage at the pharmacy.  He reports that he has been eating low-carb options.    Allergies-  Patient complains of runny nose that is worse with seasonal pollen.    Hypertension-  Likely elevated today secondary to pain.  Patient did have dental work including a tooth pulled and another tooth filled this morning.  Patient reports that blood pressure at home is usually less than 130/80 when he is not in pain.    The following portions of the patient's history were reviewed and updated as appropriate: allergies, current medications, past family history, past medical history, past social history, past surgical history and problem list.        Objective  Vital signs:  /90   Pulse 102   Temp 98.3 °F (36.8 °C) (Temporal)   Ht 185.4 cm (72.99\")   Wt 122 kg (270 lb)   SpO2 98%   BMI 35.63 kg/m²     Physical Exam  Vitals and nursing note reviewed.   Constitutional:       Appearance: Normal appearance. He is well-developed.   Eyes:      Extraocular Movements: Extraocular movements intact.      Conjunctiva/sclera: Conjunctivae normal.   Cardiovascular:      Rate and Rhythm: Normal rate and regular rhythm.      Heart sounds: Normal heart sounds. No murmur heard.  Pulmonary:      Effort: Pulmonary effort is normal. No respiratory distress.      Breath sounds: Normal breath sounds. No wheezing.   Musculoskeletal:         General: Tenderness present.      Comments: Coarse crepitus noted bilateral shoulder joints with manipulation   Skin:     General: Skin is warm and dry.      Findings: No rash.   Neurological:      Mental Status: He " is alert and oriented to person, place, and time.   Psychiatric:         Mood and Affect: Mood normal.         Behavior: Behavior normal.         Thought Content: Thought content normal.         The following data was reviewed by Arianna Kim PA-C:         Lab Results   Component Value Date    BUN 9 02/22/2024    CREATININE 0.78 02/22/2024    EGFR 106.0 02/22/2024    ALT 20 02/22/2024    AST 20 02/22/2024    CHOL 165 02/22/2024    TRIG 116 02/22/2024    HDL 36 (L) 02/22/2024     (H) 02/22/2024    TSH 2.480 12/27/2021    HGBA1C 6.50 (H) 02/22/2024           Assessment & Plan     Diagnoses and all orders for this visit:    1. Primary osteoarthritis of both shoulders (Primary)  Comments:  Bilateral intra-articular shoulder injections performed today  Advised activity as tolerated    2. B12 deficiency  -     cyanocobalamin injection 1,000 mcg    3. Type 2 diabetes mellitus without complication, without long-term current use of insulin  Comments:  Discontinue Mounjaro due to pharmacy shortage  Start ozempic 0.25 mg weekly for 1 month and titrate up to 0.5 mg weekly  Orders:  -     Semaglutide,0.25 or 0.5MG/DOS, (Ozempic, 0.25 or 0.5 MG/DOSE,) 2 MG/1.5ML solution pen-injector; Inject 0.25 mg under the skin into the appropriate area as directed 1 (One) Time Per Week.  Dispense: 3 mL; Refill: 0  -     Semaglutide,0.25 or 0.5MG/DOS, (Ozempic, 0.25 or 0.5 MG/DOSE,) 2 MG/1.5ML solution pen-injector; Inject 0.5 mg under the skin into the appropriate area as directed 1 (One) Time Per Week.  Dispense: 1.5 mL; Refill: 2    4. Allergic rhinitis due to pollen, unspecified seasonality  Comments:  Start Singulair  Advised to avoid known environmental allergens when possible  Orders:  -     montelukast (Singulair) 10 MG tablet; Take 1 tablet by mouth Every Night.  Dispense: 30 tablet; Refill: 5    5. Primary hypertension  Comments:  Likely elevated today due to pain  Continue to monitor BP daily and report any consistent readings  greater than 130/80      Procedure: Bilateral intra-articular shoulder injections  Provider: Arianna Kim PA-C  Indication: Bilateral shoulder osteoarthritis  Timeout was taken to verify correct patient procedure and location  Description: The left and right shoulders were prepped and draped in the sterile fashion.  An injection was given into each shoulder joint using 3 cc 1% lidocaine, 1 cc of triamcinolone and 1 cc of Depo-Medrol.  Pressure was held and sterile dressings were placed.  No complications  Estimated blood loss: Minimal  Patient tolerated the procedure well.    Lidocaine 1% 10 mg/mL  NDC number 05999-811-21  Lot #[id number]  Expiration 2/26    Depo-Medrol 80 mg/mL  NDC number 009-0306-02  Lot #[id number]  Expiration 5/25    Triamcinolone 40 mg/mL  NDC number [address]0045-9  Lot number [id number]  Expiration 7/2025               Patient was given instructions and counseling regarding his condition or for health maintenance advice. Please see specific information pulled into the AVS if appropriate      This document has been electronically signed by:  Arianna Kim PA-C

## 2024-06-25 ENCOUNTER — OFFICE VISIT (OUTPATIENT)
Dept: FAMILY MEDICINE CLINIC | Facility: CLINIC | Age: 54
End: 2024-06-25
Payer: COMMERCIAL

## 2024-06-25 VITALS
DIASTOLIC BLOOD PRESSURE: 84 MMHG | SYSTOLIC BLOOD PRESSURE: 140 MMHG | OXYGEN SATURATION: 97 % | HEIGHT: 73 IN | TEMPERATURE: 98.1 F | BODY MASS INDEX: 35.92 KG/M2 | HEART RATE: 71 BPM | WEIGHT: 271 LBS

## 2024-06-25 DIAGNOSIS — M19.012 PRIMARY OSTEOARTHRITIS OF BOTH SHOULDERS: Chronic | ICD-10-CM

## 2024-06-25 DIAGNOSIS — M19.011 PRIMARY OSTEOARTHRITIS OF BOTH SHOULDERS: Chronic | ICD-10-CM

## 2024-06-25 DIAGNOSIS — I10 PRIMARY HYPERTENSION: Chronic | ICD-10-CM

## 2024-06-25 DIAGNOSIS — E11.9 TYPE 2 DIABETES MELLITUS WITHOUT COMPLICATION, WITHOUT LONG-TERM CURRENT USE OF INSULIN: Primary | Chronic | ICD-10-CM

## 2024-06-25 DIAGNOSIS — E78.2 MIXED HYPERLIPIDEMIA: Chronic | ICD-10-CM

## 2024-06-25 PROCEDURE — 96372 THER/PROPH/DIAG INJ SC/IM: CPT | Performed by: PHYSICIAN ASSISTANT

## 2024-06-25 PROCEDURE — 20610 DRAIN/INJ JOINT/BURSA W/O US: CPT | Performed by: PHYSICIAN ASSISTANT

## 2024-06-25 PROCEDURE — 99214 OFFICE O/P EST MOD 30 MIN: CPT | Performed by: PHYSICIAN ASSISTANT

## 2024-06-25 RX ADMIN — CYANOCOBALAMIN 1000 MCG: 1000 INJECTION, SOLUTION INTRAMUSCULAR; SUBCUTANEOUS at 16:53

## 2024-06-25 NOTE — PATIENT INSTRUCTIONS
Joint Steroid Injection  A joint steroid injection is a procedure to relieve swelling and pain in a joint. Steroids are medicines that reduce inflammation. In this procedure, your health care provider uses a syringe and a needle to inject a steroid medicine into a painful and inflamed joint. A pain-relieving medicine (anesthetic) may be injected along with the steroid. In some cases, your health care provider may use an imaging technique such as ultrasound or fluoroscopy to guide the injection.  Joints that are often treated with steroid injections include the knee, shoulder, hip, and spine. These injections may also be used in the elbow, ankle, and joints of the hands or feet. You may have joint steroid injections as part of your treatment for inflammation caused by:  Gout.  Rheumatoid arthritis.  Advanced wear-and-tear arthritis (osteoarthritis).  Tendinitis.  Bursitis.  Joint steroid injections may be repeated, but having them too often can damage a joint or the skin over the joint. You should not have joint steroid injections less than 6 weeks apart or more than four times a year.  Tell a health care provider about:  Any allergies you have.  All medicines you are taking, including vitamins, herbs, eye drops, creams, and over-the-counter medicines.  Any problems you or family members have had with anesthetic medicines.  Any blood disorders you have.  Any surgeries you have had.  Any medical conditions you have.  Whether you are pregnant or may be pregnant.  What are the risks?  Generally, this is a safe treatment. However, problems may occur, including:  Infection.  Bleeding.  Allergic reactions to medicines.  Damage to the joint or tissues around the joint.  Thinning of skin or loss of skin color over the joint.  Temporary flushing of the face or chest.  Temporary increase in pain.  Temporary increase in blood sugar.  Failure to relieve inflammation or pain.  What happens before the treatment?  Medicines  Ask  your health care provider about:  Changing or stopping your regular medicines. This is especially important if you are taking diabetes medicines or blood thinners.  Taking medicines such as aspirin and ibuprofen. These medicines can thin your blood. Do not take these medicines unless your health care provider tells you to take them.  Taking over-the-counter medicines, vitamins, herbs, and supplements.  General instructions  You may have imaging tests of your joint.  Ask your health care provider if you can drive yourself home after the procedure.  What happens during the treatment?    Your health care provider will position you for the injection and locate the injection site over your joint.  The skin over the joint will be cleaned with a germ-killing soap.  Your health care provider may:  Spray a numbing solution (topical anesthetic) over the injection site.  Inject a local anesthetic under the skin above your joint.  The needle will be placed through your skin into your joint. Your health care provider may use imaging to guide the needle to the right spot for the injection. If imaging is used, a special contrast dye may be injected to confirm that the needle is in the correct location.  The steroid medicine will be injected into your joint.  Anesthetic may be injected along with the steroid. This may be a medicine that relieves pain for a short time (short-acting anesthetic) or for a longer time (long-acting anesthetic).  The needle will be removed, and an adhesive bandage (dressing) will be placed over the injection site.  The procedure may vary among health care providers and hospitals.  What can I expect after the treatment?  You will be able to go home after the treatment.  It is normal to feel slight flushing for a few days after the injection.  After the treatment, it is common to have an increase in joint pain after the anesthetic has worn off. This may happen about an hour after a short-acting anesthetic  or about 8 hours after a longer-acting anesthetic.  You should begin to feel relief from joint pain and swelling after 24 to 48 hours. Contact your health care provider if you do not begin to feel relief after 2 days.  Follow these instructions at home:  Injection site care  Leave the adhesive dressing over your injection site in place until your health care provider says you can remove it.  Check your injection site every day for signs of infection. Check for:  More redness, swelling, or pain.  Fluid or blood.  Warmth.  Pus or a bad smell.  Activity  Return to your normal activities as told by your health care provider. Ask your health care provider what activities are safe for you. You may be asked to limit activities that put stress on the joint for a few days.  Do joint exercises as told by your health care provider.  Do not take baths, swim, or use a hot tub until your health care provider approves. Ask your health care provider if you may take showers. You may only be allowed to take sponge baths.  Managing pain, stiffness, and swelling    If directed, put ice on the joint. To do this:  Put ice in a plastic bag.  Place a towel between your skin and the bag.  Leave the ice on for 20 minutes, 2-3 times a day.  Remove the ice if your skin turns bright red. This is very important. If you cannot feel pain, heat, or cold, you have a greater risk of damage to the area.  Raise (elevate) your joint above the level of your heart when you are sitting or lying down.  General instructions  Take over-the-counter and prescription medicines only as told by your health care provider.  Do not use any products that contain nicotine or tobacco, such as cigarettes, e-cigarettes, and chewing tobacco. These can delay joint healing. If you need help quitting, ask your health care provider.  If you have diabetes, be aware that your blood sugar may be slightly elevated for several days after the injection.  Keep all follow-up visits.  This is important.  Contact a health care provider if you have:  Chills or a fever.  Any signs of infection at your injection site.  Increased pain or swelling or no relief after 2 days.  Summary  A joint steroid injection is a treatment to relieve pain and swelling in a joint.  Steroids are medicines that reduce inflammation. Your health care provider may add an anesthetic along with the steroid.  You may have joint steroid injections as part of your arthritis treatment.  Joint steroid injections may be repeated, but having them too often can damage a joint or the skin over the joint.  Contact your health care provider if you have a fever, chills, or signs of infection, or if you get no relief from joint pain or swelling.  This information is not intended to replace advice given to you by your health care provider. Make sure you discuss any questions you have with your health care provider.  Document Revised: 05/28/2021 Document Reviewed: 05/28/2021  [employer]vier Patient Education © 2024 [employer]

## 2024-06-25 NOTE — PROGRESS NOTES
"Chief Complaint -diabetes    History of Present Illness -     Kwadwo Franks is a 54 y.o. male.     Diabetes-  Stable with hemoglobin A1c of 6.5.  Patient has maintained weight but is not at goal weight is current BMI is still 35.  He states that he is eating healthier low-carb food options    Osteoarthritis-  Patient complains of pain in bilateral shoulders due to osteoarthritis.  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  He reports tenderness and moderate achy pain in bilateral shoulders.  No known specific injury.    Hypertension-  Likely elevated today due to shoulder pain/osteoarthritis.  Patient reports home blood pressures usually less than 130/80 with dietary modification    Hyperlipidemia-  Stable with rosuvastatin and low-cholesterol diet    The following portions of the patient's history were reviewed and updated as appropriate: allergies, current medications, past family history, past medical history, past social history, past surgical history and problem list.        Objective  Vital signs:  /84   Pulse 71   Temp 98.1 °F (36.7 °C) (Temporal)   Ht 185.4 cm (72.99\")   Wt 123 kg (271 lb)   SpO2 97%   BMI 35.76 kg/m²     Physical Exam  Vitals and nursing note reviewed.   Constitutional:       Appearance: Normal appearance. He is well-developed.   Eyes:      Extraocular Movements: Extraocular movements intact.      Conjunctiva/sclera: Conjunctivae normal.   Cardiovascular:      Rate and Rhythm: Normal rate and regular rhythm.      Heart sounds: Normal heart sounds. No murmur heard.  Pulmonary:      Effort: Pulmonary effort is normal. No respiratory distress.      Breath sounds: Normal breath sounds. No wheezing.   Musculoskeletal:      Comments: Coarse crepitus noted bilateral shoulder joints with manipulation   Skin:     General: Skin is warm and dry.      Findings: No rash.   Neurological:      Mental Status: He is alert and oriented to person, place, and time. "   Psychiatric:         Mood and Affect: Mood normal.         Behavior: Behavior normal.         Thought Content: Thought content normal.         The following data was reviewed by Arianna Kim PA-C:         Lab Results   Component Value Date    BUN 9 02/22/2024    CREATININE 0.78 02/22/2024    EGFR 106.0 02/22/2024    ALT 20 02/22/2024    AST 20 02/22/2024    CHOL 165 02/22/2024    TRIG 116 02/22/2024    HDL 36 (L) 02/22/2024     (H) 02/22/2024    TSH 2.480 12/27/2021    HGBA1C 6.50 (H) 02/22/2024           Assessment & Plan     Diagnoses and all orders for this visit:    1. Type 2 diabetes mellitus without complication, without long-term current use of insulin (Primary)  Comments:  Increase semaglutide 1 mg weekly  Advised a low carbohydrate diabetic diet  Orders:  -     Semaglutide, 1 MG/DOSE, (OZEMPIC) 4 MG/3ML solution pen-injector; Inject 1 mg under the skin into the appropriate area as directed 1 (One) Time Per Week.  Dispense: 3 mL; Refill: 3    2. Primary osteoarthritis of both shoulders  Comments:  Bilateral intra-articular shoulder injections performed today    3. Primary hypertension  Comments:  Likely elevated today secondary to osteoarthritis/shoulder pain  Advised to monitor daily BP and pulse    4. Mixed hyperlipidemia  Comments:  Continue rosuvastatin  Advised low-cholesterol diet      Procedure: Bilateral intra-articular shoulder joint injections  Provider: Arianna Kim PA-C  Indication: Osteoarthritis of both shoulders  Timeout was taken to verify correct patient procedure and location  Description: The left and right shoulder joints were prepped and draped in sterile fashion.  An intra-articular injection was performed into each shoulder joint using 3 cc 1% lidocaine, 1 cc of triamcinolone and 1 cc of Depo-Medrol.  Pressure was held and sterile dressings were placed.  No complications  Estimated blood loss: Minimal  Patient tolerated the procedures well.    Lidocaine 1% 10 mg/mL  NDC number  16950-822-97  Lot #[id number]  Expiration 3/27    Triamcinolone 40 mg/mL  NDC number 7175-0576-39  Lot #[id number]  Expiration 1/26    Depo-Medrol 40 mg/mL  NDC number 0660-2069-09  Lot number [id number]  Expiration 8/2025               Patient was given instructions and counseling regarding his condition or for health maintenance advice. Please see specific information pulled into the AVS if appropriate      This document has been electronically signed by:  Arianna Kim PA-C

## 2024-07-23 ENCOUNTER — PROCEDURE VISIT (OUTPATIENT)
Dept: FAMILY MEDICINE CLINIC | Facility: CLINIC | Age: 54
End: 2024-07-23
Payer: COMMERCIAL

## 2024-07-23 VITALS
DIASTOLIC BLOOD PRESSURE: 90 MMHG | WEIGHT: 263 LBS | HEART RATE: 75 BPM | OXYGEN SATURATION: 98 % | HEIGHT: 73 IN | BODY MASS INDEX: 34.85 KG/M2 | TEMPERATURE: 98.4 F | SYSTOLIC BLOOD PRESSURE: 150 MMHG

## 2024-07-23 DIAGNOSIS — E11.9 TYPE 2 DIABETES MELLITUS WITHOUT COMPLICATION, WITHOUT LONG-TERM CURRENT USE OF INSULIN: Chronic | ICD-10-CM

## 2024-07-23 DIAGNOSIS — B07.8 OTHER VIRAL WARTS: ICD-10-CM

## 2024-07-23 DIAGNOSIS — I10 ESSENTIAL HYPERTENSION: Primary | Chronic | ICD-10-CM

## 2024-07-23 DIAGNOSIS — E78.2 MIXED HYPERLIPIDEMIA: Chronic | ICD-10-CM

## 2024-07-23 PROCEDURE — 99214 OFFICE O/P EST MOD 30 MIN: CPT | Performed by: PHYSICIAN ASSISTANT

## 2024-07-23 PROCEDURE — 17110 DESTRUCTION B9 LES UP TO 14: CPT | Performed by: PHYSICIAN ASSISTANT

## 2024-07-23 RX ORDER — LOSARTAN POTASSIUM 25 MG/1
25 TABLET ORAL DAILY
Qty: 30 TABLET | Refills: 5 | Status: SHIPPED | OUTPATIENT
Start: 2024-07-23

## 2024-07-23 NOTE — PROGRESS NOTES
"Chief Complaint -wart    History of Present Illness -     Kwadwo Franks is a 54 y.o. male.     Wart-  Patient complains of a large wart on his left elbow.  The area is irritated and tender causing pain with movement.  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  Plan to do cryotherapy today.    Hypertension-  Not at goal without blood pressure medication.  Weight loss of 8 pounds has not significantly changed the blood pressure.    Diabetes mellitus type 2-  Stable with Ozempic 1 mg weekly and low-carb diet    Hyperlipidemia-  Stable with rosuvastatin and dietary modification      The following portions of the patient's history were reviewed and updated as appropriate: allergies, current medications, past family history, past medical history, past social history, past surgical history and problem list.        Objective  Vital signs:  /90   Pulse 75   Temp 98.4 °F (36.9 °C) (Temporal)   Ht 185.4 cm (72.99\")   Wt 119 kg (263 lb)   SpO2 98%   BMI 34.71 kg/m²     Physical Exam  Vitals and nursing note reviewed.   Constitutional:       Appearance: Normal appearance. He is well-developed.   Eyes:      Extraocular Movements: Extraocular movements intact.      Conjunctiva/sclera: Conjunctivae normal.   Cardiovascular:      Rate and Rhythm: Normal rate and regular rhythm.      Heart sounds: Normal heart sounds. No murmur heard.  Pulmonary:      Effort: Pulmonary effort is normal. No respiratory distress.      Breath sounds: Normal breath sounds. No wheezing.   Musculoskeletal:         General: No tenderness.   Skin:     General: Skin is warm and dry.      Findings: No rash.      Comments: Approximately 1 x 1 cm wart noted left elbow   Neurological:      Mental Status: He is alert and oriented to person, place, and time.   Psychiatric:         Mood and Affect: Mood normal.         Behavior: Behavior normal.         Thought Content: Thought content normal.         The " following data was reviewed by Arianna Kim PA-C:         Lab Results   Component Value Date    BUN 9 02/22/2024    CREATININE 0.78 02/22/2024    EGFR 106.0 02/22/2024    ALT 20 02/22/2024    AST 20 02/22/2024    CHOL 165 02/22/2024    TRIG 116 02/22/2024    HDL 36 (L) 02/22/2024     (H) 02/22/2024    TSH 2.480 12/27/2021    HGBA1C 6.50 (H) 02/22/2024           Assessment & Plan     Diagnoses and all orders for this visit:    1. Essential hypertension (Primary)  Comments:  Start losartan 25 mg daily  Advise daily BP and pulse monitoring  Orders:  -     losartan (COZAAR) 25 MG tablet; Take 1 tablet by mouth Daily.  Dispense: 30 tablet; Refill: 5    2. Other viral warts  Comments:  Procedure: Cryotherapy using liquid nitrogen  Provider: Arianna Kim PA-C  Indication: Wart left elbow  Timeout was taken to verify correct patient procedure and location  Description:  Cryotherapy performed to wart on left elbow using liquid nitrogen.  Patient tolerated the procedure well.    3. Mixed hyperlipidemia  Comments:  Continue rosuvastatin  Advised low-cholesterol diet    4. Type 2 diabetes mellitus without complication, without long-term current use of insulin  Comments:  Continue Ozempic 1 mg weekly                   Patient was given instructions and counseling regarding his condition or for health maintenance advice. Please see specific information pulled into the AVS if appropriate      This document has been electronically signed by:  Arianna Kim PA-C

## 2024-07-23 NOTE — PATIENT INSTRUCTIONS
"Fat and Cholesterol Restricted Eating Plan  Getting too much fat and cholesterol in your diet may cause health problems. Choosing the right foods helps keep your fat and cholesterol at normal levels. This can keep you from getting certain diseases.  Your doctor may recommend an eating plan that includes:  Total fat: ______% or less of total calories a day. This is ______g of fat a day.  Saturated fat: ______% or less of total calories a day. This is ______g of saturated fat a day.  Cholesterol: less than _________mg a day.  Fiber: ______g a day.  What are tips for following this plan?  General tips  Work with your doctor to lose weight if you need to.  Avoid:  Foods with added sugar.  Fried foods.  Foods with trans fat or partially hydrogenated oils. This includes some margarines and baked goods.  If you drink alcohol:  Limit how much you have to:  0-1 drink a day for women who are not pregnant.  0-2 drinks a day for men.  Know how much alcohol is in a drink. In the [place] one drink equals one 12 oz bottle of beer (355 mL), one 5 oz glass of wine (148 mL), or one 1½ oz glass of hard liquor (44 mL).  Reading food labels  Check food labels for:  Trans fats.  Partially hydrogenated oils.  Saturated fat (g) in each serving.  Cholesterol (mg) in each serving.  Fiber (g) in each serving.  Choose foods with healthy fats, such as:  Monounsaturated fats and polyunsaturated fats. These include olive and canola oil, flaxseeds, walnuts, almonds, and seeds.  Omega-3 fats. These are found in certain fish, flaxseed oil, and ground flaxseeds.  Choose grain products that have whole grains. Look for the word \"whole\" as the first word in the ingredient list.  Cooking  Cook foods using low-fat methods. These include baking, boiling, grilling, and broiling.  Eat more home-cooked foods. Eat at restaurants and buffets less often. Eat less fast food.  Avoid cooking using saturated fats, such as butter, cream, palm oil, palm kernel oil, and " coconut oil.  Meal planning    At meals, divide your plate into four equal parts:  Fill one-half of your plate with vegetables, green salads, and fruit.  Fill one-fourth of your plate with whole grains.  Fill one-fourth of your plate with low-fat (lean) protein foods.  Eat fish that is high in omega-3 fats at least two times a week. This includes mackerel, tuna, sardines, and salmon.  Eat foods that are high in fiber, such as whole grains, beans, apples, pears, berries, broccoli, carrots, peas, and barley.  What foods should I eat?  Fruits  All fresh, canned (in natural juice), or frozen fruits.  Vegetables  Fresh or frozen vegetables (raw, steamed, roasted, or grilled). Green salads.  Grains  Whole grains, such as whole wheat or whole grain breads, crackers, cereals, and pasta. Unsweetened oatmeal, bulgur, barley, quinoa, or brown rice. Corn or whole wheat flour tortillas.  Meats and other protein foods  Ground beef (85% or leaner), grass-fed beef, or beef trimmed of fat. Skinless chicken or turkey. Ground chicken or turkey. Pork trimmed of fat. All fish and seafood. Egg whites. Dried beans, peas, or lentils. Unsalted nuts or seeds. Unsalted canned beans. Nut butters without added sugar or oil.  Dairy  Low-fat or nonfat dairy products, such as skim or 1% milk, 2% or reduced-fat cheeses, low-fat and fat-free ricotta or cottage cheese, or plain low-fat and nonfat yogurt.  Fats and oils  Tub margarine without trans fats. Light or reduced-fat mayonnaise and salad dressings. Avocado. Olive, canola, sesame, or safflower oils.  The items listed above may not be a complete list of foods and beverages you can eat. Contact a dietitian for more information.  What foods should I avoid?  Fruits  Canned fruit in heavy syrup. Fruit in cream or butter sauce. Fried fruit.  Vegetables  Vegetables cooked in cheese, cream, or butter sauce. Fried vegetables.  Grains  White bread. White pasta. White rice. Cornbread. Bagels, pastries,  and croissants. Crackers and snack foods that contain trans fat and hydrogenated oils.  Meats and other protein foods  Fatty cuts of meat. Ribs, chicken wings, wiseman, sausage, bologna, salami, chitterlings, fatback, hot dogs, bratwurst, and packaged lunch meats. Liver and organ meats. Whole eggs and egg yolks. Chicken and turkey with skin. Fried meat.  Dairy  Whole or 2% milk, cream, half-and-half, and cream cheese. Whole milk cheeses. Whole-fat or sweetened yogurt. Full-fat cheeses. Nondairy creamers and whipped toppings. Processed cheese, cheese spreads, and cheese curds.  Fats and oils  Butter, stick margarine, lard, shortening, ghee, or wiseman fat. Coconut, palm kernel, and palm oils.  Beverages  Alcohol. Sugar-sweetened drinks such as sodas, lemonade, and fruit drinks.  Sweets and desserts  Corn syrup, sugars, honey, and molasses. Candy. Jam and jelly. Syrup. Sweetened cereals. Cookies, pies, cakes, donuts, muffins, and ice cream.  The items listed above may not be a complete list of foods and beverages you should avoid. Contact a dietitian for more information.  Summary  Choosing the right foods helps keep your fat and cholesterol at normal levels. This can keep you from getting certain diseases.  At meals, fill one-half of your plate with vegetables, green salads, and fruits.  Eat high fiber foods, like whole grains, beans, apples, pears, berries, carrots, peas, and barley.  Limit added sugar, saturated fats, alcohol, and fried foods.  This information is not intended to replace advice given to you by your health care provider. Make sure you discuss any questions you have with your health care provider.  Document Revised: 04/29/2022 Document Reviewed: 04/29/2022  Elsevier Patient Education © 2024 [employer]  Carbohydrate Counting for Diabetes Mellitus, Adult  Carbohydrate counting is a method of keeping track of how many carbohydrates you eat. Eating carbohydrates increases the amount of sugar (glucose) in  the blood. Counting how many carbohydrates you eat improves how well you manage your blood glucose. This, in turn, helps you manage your diabetes.  Carbohydrates are measured in grams (g) per serving. It is important to know how many carbohydrates (in grams or by serving size) you can have in each meal. This is different for every person. A dietitian can help you make a meal plan and calculate how many carbohydrates you should have at each meal and snack.  What foods contain carbohydrates?  Carbohydrates are found in the following foods:  Grains, such as breads and cereals.  Dried beans and soy products.  Starchy vegetables, such as potatoes, peas, and corn.  Fruit and fruit juices.  Milk and yogurt.  Sweets and snack foods, such as cake, cookies, candy, chips, and soft drinks.  How do I count carbohydrates in foods?  There are two ways to count carbohydrates in food. You can read food labels or learn standard serving sizes of foods. You can use either of these methods or a combination of both.  Using the Nutrition Facts label  The Nutrition Facts list is included on the labels of almost all packaged foods and beverages in the [place]. It includes:  The serving size.  Information about nutrients in each serving, including the grams of carbohydrate per serving.  To use the Nutrition Facts, decide how many servings you will have. Then, multiply the number of servings by the number of carbohydrates per serving. The resulting number is the total grams of carbohydrates that you will be having.  Learning the standard serving sizes of foods  When you eat carbohydrate foods that are not packaged or do not include Nutrition Facts on the label, you need to measure the servings in order to count the grams of carbohydrates.  Measure the foods that you will eat with a food scale or measuring cup, if needed.  Decide how many standard-size servings you will eat.  Multiply the number of servings by 15. For foods that contain  carbohydrates, one serving equals 15 g of carbohydrates.  For example, if you eat 2 cups or 10 oz (300 g) of strawberries, you will have eaten 2 servings and 30 g of carbohydrates (2 servings x 15 g = 30 g).  For foods that have more than one food mixed, such as soups and casseroles, you must count the carbohydrates in each food that is included.  The following list contains standard serving sizes of common carbohydrate-rich foods. Each of these servings has about 15 g of carbohydrates:  1 slice of bread.  1 six-inch (15 cm) tortilla.  ? cup or 2 oz (53 g) cooked rice or pasta.  ½ cup or 3 oz (85 g) cooked or canned, drained and rinsed beans or lentils.  ½ cup or 3 oz (85 g) starchy vegetable, such as peas, corn, or squash.  ½ cup or 4 oz (120 g) hot cereal.  ½ cup or 3 oz (85 g) boiled or mashed potatoes, or ¼ or 3 oz (85 g) of a large baked potato.  ½ cup or 4 fl oz (118 mL) fruit juice.  1 cup or 8 fl oz (237 mL) milk.  1 small or 4 oz (106 g) apple.  ½ or 2 oz (63 g) of a medium banana.  1 cup or 5 oz (150 g) strawberries.  3 cups or 1 oz (28.3 g) popped popcorn.  What is an example of carbohydrate counting?  To calculate the grams of carbohydrates in this sample meal, follow the steps shown below.  Sample meal  3 oz (85 g) chicken breast.  ? cup or 4 oz (106 g) brown rice.  ½ cup or 3 oz (85 g) corn.  1 cup or 8 fl oz (237 mL) milk.  1 cup or 5 oz (150 g) strawberries with sugar-free whipped topping.  Carbohydrate calculation  Identify the foods that contain carbohydrates:  Rice.  Corn.  Milk.  Strawberries.  Calculate how many servings you have of each food:  2 servings rice.  1 serving corn.  1 serving milk.  1 serving strawberries.  Multiply each number of servings by 15  servings rice x 15 g = 30 g.  1 serving corn x 15 g = 15 g.  1 serving milk x 15 g = 15 g.  1 serving strawberries x 15 g = 15 g.  Add together all of the amounts to find the total grams of carbohydrates eaten:  30 g + 15 g + 15 g + 15  g = 75 g of carbohydrates total.  What are tips for following this plan?  Shopping  Develop a meal plan and then make a shopping list.  Buy fresh and frozen vegetables, fresh and frozen fruit, dairy, eggs, beans, lentils, and whole grains.  Look at food labels. Choose foods that have more fiber and less sugar.  Avoid processed foods and foods with added sugars.  Meal planning  Aim to have the same number of grams of carbohydrates at each meal and for each snack time.  Plan to have regular, balanced meals and snacks.  Where to find more information  American Diabetes Association: diabetes.org  Centers for Disease Control and Prevention: cdc.gov  Academy of Nutrition and Dietetics: eatright.org  Association of Diabetes Care & Education Specialists: diabeteseducator.org  Summary  Carbohydrate counting is a method of keeping track of how many carbohydrates you eat.  Eating carbohydrates increases the amount of sugar (glucose) in your blood.  Counting how many carbohydrates you eat improves how well you manage your blood glucose. This helps you manage your diabetes.  A dietitian can help you make a meal plan and calculate how many carbohydrates you should have at each meal and snack.  This information is not intended to replace advice given to you by your health care provider. Make sure you discuss any questions you have with your health care provider.  Document Revised: 07/21/2021 Document Reviewed: 07/21/2021  Elsevier Patient Education © 2024 [employer]

## 2024-10-26 DIAGNOSIS — E11.9 TYPE 2 DIABETES MELLITUS WITHOUT COMPLICATION, WITHOUT LONG-TERM CURRENT USE OF INSULIN: Chronic | ICD-10-CM

## 2024-10-28 RX ORDER — SEMAGLUTIDE 1.34 MG/ML
INJECTION, SOLUTION SUBCUTANEOUS
Qty: 3 ML | Refills: 0 | Status: SHIPPED | OUTPATIENT
Start: 2024-10-28

## 2024-12-02 ENCOUNTER — OFFICE VISIT (OUTPATIENT)
Dept: FAMILY MEDICINE CLINIC | Facility: CLINIC | Age: 54
End: 2024-12-02
Payer: COMMERCIAL

## 2024-12-02 VITALS
BODY MASS INDEX: 36.05 KG/M2 | RESPIRATION RATE: 14 BRPM | WEIGHT: 272 LBS | HEART RATE: 82 BPM | TEMPERATURE: 97.7 F | DIASTOLIC BLOOD PRESSURE: 100 MMHG | SYSTOLIC BLOOD PRESSURE: 160 MMHG | OXYGEN SATURATION: 97 % | HEIGHT: 73 IN

## 2024-12-02 DIAGNOSIS — I10 ESSENTIAL HYPERTENSION: Chronic | ICD-10-CM

## 2024-12-02 DIAGNOSIS — M19.011 PRIMARY OSTEOARTHRITIS OF BOTH SHOULDERS: Chronic | ICD-10-CM

## 2024-12-02 DIAGNOSIS — E53.8 B12 DEFICIENCY: ICD-10-CM

## 2024-12-02 DIAGNOSIS — M19.012 PRIMARY OSTEOARTHRITIS OF BOTH SHOULDERS: Chronic | ICD-10-CM

## 2024-12-02 DIAGNOSIS — J40 BRONCHITIS: Primary | ICD-10-CM

## 2024-12-02 DIAGNOSIS — F41.1 GAD (GENERALIZED ANXIETY DISORDER): ICD-10-CM

## 2024-12-02 DIAGNOSIS — J30.1 ALLERGIC RHINITIS DUE TO POLLEN, UNSPECIFIED SEASONALITY: Chronic | ICD-10-CM

## 2024-12-02 DIAGNOSIS — E78.2 MIXED HYPERLIPIDEMIA: Chronic | ICD-10-CM

## 2024-12-02 DIAGNOSIS — E11.9 TYPE 2 DIABETES MELLITUS WITHOUT COMPLICATION, WITHOUT LONG-TERM CURRENT USE OF INSULIN: Chronic | ICD-10-CM

## 2024-12-02 LAB
EXPIRATION DATE: ABNORMAL
HBA1C MFR BLD: 6.2 % (ref 4.5–5.7)
Lab: ABNORMAL

## 2024-12-02 PROCEDURE — 83036 HEMOGLOBIN GLYCOSYLATED A1C: CPT | Performed by: PHYSICIAN ASSISTANT

## 2024-12-02 PROCEDURE — 36416 COLLJ CAPILLARY BLOOD SPEC: CPT | Performed by: PHYSICIAN ASSISTANT

## 2024-12-02 PROCEDURE — 99214 OFFICE O/P EST MOD 30 MIN: CPT | Performed by: PHYSICIAN ASSISTANT

## 2024-12-02 PROCEDURE — 20610 DRAIN/INJ JOINT/BURSA W/O US: CPT | Performed by: PHYSICIAN ASSISTANT

## 2024-12-02 PROCEDURE — 96372 THER/PROPH/DIAG INJ SC/IM: CPT | Performed by: PHYSICIAN ASSISTANT

## 2024-12-02 RX ORDER — CYANOCOBALAMIN 1000 UG/ML
1000 INJECTION, SOLUTION INTRAMUSCULAR; SUBCUTANEOUS ONCE
Status: COMPLETED | OUTPATIENT
Start: 2024-12-02 | End: 2024-12-02

## 2024-12-02 RX ORDER — LOSARTAN POTASSIUM 50 MG/1
50 TABLET ORAL DAILY
Qty: 30 TABLET | Refills: 5 | Status: SHIPPED | OUTPATIENT
Start: 2024-12-02

## 2024-12-02 RX ORDER — MONTELUKAST SODIUM 10 MG/1
10 TABLET ORAL NIGHTLY
Qty: 30 TABLET | Refills: 5 | Status: SHIPPED | OUTPATIENT
Start: 2024-12-02

## 2024-12-02 RX ORDER — ROSUVASTATIN CALCIUM 20 MG/1
20 TABLET, COATED ORAL DAILY
Qty: 30 TABLET | Refills: 5 | Status: SHIPPED | OUTPATIENT
Start: 2024-12-02

## 2024-12-02 RX ORDER — FLUOXETINE 40 MG/1
40 CAPSULE ORAL DAILY
Qty: 30 CAPSULE | Refills: 5 | Status: SHIPPED | OUTPATIENT
Start: 2024-12-02

## 2024-12-02 RX ORDER — DOXYCYCLINE 100 MG/1
100 CAPSULE ORAL 2 TIMES DAILY
Qty: 20 CAPSULE | Refills: 0 | Status: SHIPPED | OUTPATIENT
Start: 2024-12-02 | End: 2024-12-12

## 2024-12-02 RX ADMIN — CYANOCOBALAMIN 1000 MCG: 1000 INJECTION, SOLUTION INTRAMUSCULAR; SUBCUTANEOUS at 09:21

## 2024-12-02 NOTE — PROGRESS NOTES
Injection  Injection performed in right deltoid by Annika Franks MA. Patient tolerated the procedure well without complications.  12/02/24   Annika Franks MA

## 2024-12-02 NOTE — PROGRESS NOTES
"Chief Complaint -cough    History of Present Illness -     Kwadwo Franks is a 54 y.o. male.     Cough-  Patient complains of productive cough with thick green sputum, chest congestion headache and fatigue that began 4 days ago.    Shoulder pain-  Patient complains of bilateral shoulder pain due to osteoarthritis.  Pain is described as moderate to severe aching in bilateral shoulder joints that is worse with manual labor as a  and cold weather.    B12 deficiency-  Stable with B12 supplementation    Generalized anxiety disorder-  Stable with Prozac.  He denies any hallucinations, SI or HI    Hypertension-  Not at goal today.  Patient reports that blood pressure has been running high at home despite using losartan 50 mg daily.  He is also in pain today.    Allergic rhinitis-  Stable with Singulair    Diabetes mellitus-  Stable with semaglutide but patient states that he feels like the medication wears off before the week is up.  He is interested in glucose control and appetite suppression    Hyperlipidemia-  Stable with rosuvastatin and low-cholesterol diet      The following portions of the patient's history were reviewed and updated as appropriate: allergies, current medications, past family history, past medical history, past social history, past surgical history and problem list.        Objective  Vital signs:  /100 (BP Location: Right arm, Patient Position: Sitting, Cuff Size: Adult)   Pulse 82   Temp 97.7 °F (36.5 °C) (Temporal)   Resp 14   Ht 185.4 cm (72.99\")   Wt 123 kg (272 lb)   SpO2 97%   BMI 35.89 kg/m²     Physical Exam  Vitals and nursing note reviewed.   Constitutional:       Appearance: Normal appearance. He is well-developed.   Eyes:      Extraocular Movements: Extraocular movements intact.      Conjunctiva/sclera: Conjunctivae normal.   Cardiovascular:      Rate and Rhythm: Normal rate and regular rhythm.      Heart sounds: Normal heart sounds. No murmur heard.  Pulmonary: "      Effort: Pulmonary effort is normal. No respiratory distress.      Breath sounds: Normal breath sounds. No wheezing.   Musculoskeletal:         General: Tenderness present.      Comments: Coarse crepitus noted with bilateral shoulder joint manipulation.   Skin:     General: Skin is warm and dry.      Findings: No rash.   Neurological:      Mental Status: He is alert and oriented to person, place, and time.   Psychiatric:         Mood and Affect: Mood normal.         Behavior: Behavior normal.         Thought Content: Thought content normal.         The following data was reviewed by Arianna Kim PA-C:         Lab Results   Component Value Date    BUN 9 02/22/2024    CREATININE 0.78 02/22/2024    EGFR 106.0 02/22/2024    ALT 20 02/22/2024    AST 20 02/22/2024    CHOL 165 02/22/2024    TRIG 116 02/22/2024    HDL 36 (L) 02/22/2024     (H) 02/22/2024    TSH 2.480 12/27/2021    HGBA1C 6.2 (A) 12/02/2024           Assessment & Plan     Diagnoses and all orders for this visit:    1. Bronchitis (Primary)  -     doxycycline (VIBRAMYCIN) 100 MG capsule; Take 1 capsule by mouth 2 (Two) Times a Day for 10 days.  Dispense: 20 capsule; Refill: 0    2. SCOTT (generalized anxiety disorder)  Comments:  discontinue zoloft due to increased appetite  Continue Prozac  Orders:  -     FLUoxetine (PROzac) 40 MG capsule; Take 1 capsule by mouth Daily.  Dispense: 30 capsule; Refill: 5    3. Essential hypertension  Comments:  Increase losartan 50 mg daily  Advise daily BP and pulse monitoring  Orders:  -     losartan (COZAAR) 50 MG tablet; Take 1 tablet by mouth Daily.  Dispense: 30 tablet; Refill: 5    4. Allergic rhinitis due to pollen, unspecified seasonality  Comments:  Continue Singulair  Advised to avoid known environmental allergens when possible  Orders:  -     montelukast (Singulair) 10 MG tablet; Take 1 tablet by mouth Every Night.  Dispense: 30 tablet; Refill: 5    5. Type 2 diabetes mellitus without complication, without  long-term current use of insulin  Comments:  Increase semaglutide 2 mg weekly and discussed giving 1 mg biweekly as an option  Advised a low carbohydrate diabetic diet  Orders:  -     Discontinue: Semaglutide, 2 MG/DOSE, (OZEMPIC) 8 MG/3ML solution pen-injector; Inject 2 mg under the skin into the appropriate area as directed 1 (One) Time Per Week.  Dispense: 3 mL; Refill: 3  -     Semaglutide, 2 MG/DOSE, (OZEMPIC) 8 MG/3ML solution pen-injector; Inject 2 mg under the skin into the appropriate area as directed 1 (One) Time Per Week.  Dispense: 3 mL; Refill: 3  -     POCT glycated hemoglobin, total    6. Mixed hyperlipidemia  Comments:  Continue rosuvastatin  Advised low-cholesterol diet  Orders:  -     rosuvastatin (CRESTOR) 20 MG tablet; Take 1 tablet by mouth Daily.  Dispense: 30 tablet; Refill: 5    7. B12 deficiency  -     cyanocobalamin injection 1,000 mcg    8. Primary osteoarthritis of both shoulders  Comments:  Bilateral intra-articular shoulder joint injections given today      Procedure: Bilateral intra-articular shoulder joint injections  Provider: Arianna Kim PA-C  Indication: Bilateral shoulder pain due to osteoarthritis  Timeout was taken to verify correct patient procedure and location  Description: The bilateral shoulder joints were prepped and draped in sterile fashion.  An intra-articular injection was given into each shoulder joint using 3 cc 1% lidocaine, 1 cc of triamcinolone and 1 cc of Depo-Medrol.  Pressure was held and sterile dressings were placed.  No complications  Estimated blood loss: Minimal  Patient tolerated the procedure well    Lidocaine 1% 20 mg per 2 mL  NDC number 13161-738-26  Lot number [id number]  Expiration November 2025    Triamcinolone 40 mg/mL  NDC number 7403-0880-70  Lot #[id number]  Expiration January 2026    Depo-Medrol 40 mg/mL  NDC number 1684-7578-45  Lot number [id number]  Expiration 8/2025               Patient was given instructions and counseling regarding his  condition or for health maintenance advice. Please see specific information pulled into the AVS if appropriate      This document has been electronically signed by:  Arianna Kim PA-C

## 2024-12-02 NOTE — PATIENT INSTRUCTIONS
Joint Steroid Injection  A joint steroid injection is a procedure to relieve swelling and pain in a joint. Steroids are medicines that reduce inflammation. In this procedure, your health care provider uses a syringe and a needle to inject a steroid medicine into a painful and inflamed joint. A pain-relieving medicine (anesthetic) may be injected along with the steroid. In some cases, your health care provider may use an imaging technique such as ultrasound or fluoroscopy to guide the injection.  Joints that are often treated with steroid injections include the knee, shoulder, hip, and spine. These injections may also be used in the elbow, ankle, and joints of the hands or feet. You may have joint steroid injections as part of your treatment for inflammation caused by:  Gout.  Rheumatoid arthritis.  Advanced wear-and-tear arthritis (osteoarthritis).  Tendinitis.  Bursitis.  Joint steroid injections may be repeated, but having them too often can damage a joint or the skin over the joint. You should not have joint steroid injections less than 6 weeks apart or more than four times a year.  Tell a health care provider about:  Any allergies you have.  All medicines you are taking, including vitamins, herbs, eye drops, creams, and over-the-counter medicines.  Any problems you or family members have had with anesthetic medicines.  Any blood disorders you have.  Any surgeries you have had.  Any medical conditions you have.  Whether you are pregnant or may be pregnant.  What are the risks?  Generally, this is a safe treatment. However, problems may occur, including:  Infection.  Bleeding.  Allergic reactions to medicines.  Damage to the joint or tissues around the joint.  Thinning of skin or loss of skin color over the joint.  Temporary flushing of the face or chest.  Temporary increase in pain.  Temporary increase in blood sugar.  Failure to relieve inflammation or pain.  What happens before the treatment?  Medicines  Ask  your health care provider about:  Changing or stopping your regular medicines. This is especially important if you are taking diabetes medicines or blood thinners.  Taking medicines such as aspirin and ibuprofen. These medicines can thin your blood. Do not take these medicines unless your health care provider tells you to take them.  Taking over-the-counter medicines, vitamins, herbs, and supplements.  General instructions  You may have imaging tests of your joint.  Ask your health care provider if you can drive yourself home after the procedure.  What happens during the treatment?    Your health care provider will position you for the injection and locate the injection site over your joint.  The skin over the joint will be cleaned with a germ-killing soap.  Your health care provider may:  Spray a numbing solution (topical anesthetic) over the injection site.  Inject a local anesthetic under the skin above your joint.  The needle will be placed through your skin into your joint. Your health care provider may use imaging to guide the needle to the right spot for the injection. If imaging is used, a special contrast dye may be injected to confirm that the needle is in the correct location.  The steroid medicine will be injected into your joint.  Anesthetic may be injected along with the steroid. This may be a medicine that relieves pain for a short time (short-acting anesthetic) or for a longer time (long-acting anesthetic).  The needle will be removed, and an adhesive bandage (dressing) will be placed over the injection site.  The procedure may vary among health care providers and hospitals.  What can I expect after the treatment?  You will be able to go home after the treatment.  It is normal to feel slight flushing for a few days after the injection.  After the treatment, it is common to have an increase in joint pain after the anesthetic has worn off. This may happen about an hour after a short-acting anesthetic  or about 8 hours after a longer-acting anesthetic.  You should begin to feel relief from joint pain and swelling after 24 to 48 hours. Contact your health care provider if you do not begin to feel relief after 2 days.  Follow these instructions at home:  Injection site care  Leave the adhesive dressing over your injection site in place until your health care provider says you can remove it.  Check your injection site every day for signs of infection. Check for:  More redness, swelling, or pain.  Fluid or blood.  Warmth.  Pus or a bad smell.  Activity  Return to your normal activities as told by your health care provider. Ask your health care provider what activities are safe for you. You may be asked to limit activities that put stress on the joint for a few days.  Do joint exercises as told by your health care provider.  Do not take baths, swim, or use a hot tub until your health care provider approves. Ask your health care provider if you may take showers. You may only be allowed to take sponge baths.  Managing pain, stiffness, and swelling    If directed, put ice on the joint. To do this:  Put ice in a plastic bag.  Place a towel between your skin and the bag.  Leave the ice on for 20 minutes, 2-3 times a day.  Remove the ice if your skin turns bright red. This is very important. If you cannot feel pain, heat, or cold, you have a greater risk of damage to the area.  Raise (elevate) your joint above the level of your heart when you are sitting or lying down.  General instructions  Take over-the-counter and prescription medicines only as told by your health care provider.  Do not use any products that contain nicotine or tobacco, such as cigarettes, e-cigarettes, and chewing tobacco. These can delay joint healing. If you need help quitting, ask your health care provider.  If you have diabetes, be aware that your blood sugar may be slightly elevated for several days after the injection.  Keep all follow-up visits.  This is important.  Contact a health care provider if you have:  Chills or a fever.  Any signs of infection at your injection site.  Increased pain or swelling or no relief after 2 days.  Summary  A joint steroid injection is a treatment to relieve pain and swelling in a joint.  Steroids are medicines that reduce inflammation. Your health care provider may add an anesthetic along with the steroid.  You may have joint steroid injections as part of your arthritis treatment.  Joint steroid injections may be repeated, but having them too often can damage a joint or the skin over the joint.  Contact your health care provider if you have a fever, chills, or signs of infection, or if you get no relief from joint pain or swelling.  This information is not intended to replace advice given to you by your health care provider. Make sure you discuss any questions you have with your health care provider.  Document Revised: 05/28/2021 Document Reviewed: 05/28/2021  [employer] Patient Education © 2024 [employer]  Fat and Cholesterol Restricted Eating Plan  Getting too much fat and cholesterol in your diet may cause health problems. Choosing the right foods helps keep your fat and cholesterol at normal levels. This can keep you from getting certain diseases.  Your doctor may recommend an eating plan that includes:  Total fat: ______% or less of total calories a day. This is ______g of fat a day.  Saturated fat: ______% or less of total calories a day. This is ______g of saturated fat a day.  Cholesterol: less than _________mg a day.  Fiber: ______g a day.  What are tips for following this plan?  General tips  Work with your doctor to lose weight if you need to.  Avoid:  Foods with added sugar.  Fried foods.  Foods with trans fat or partially hydrogenated oils. This includes some margarines and baked goods.  If you drink alcohol:  Limit how much you have to:  0-1 drink a day for women who are not pregnant.  0-2 drinks a day for  "men.  Know how much alcohol is in a drink. In the [place] one drink equals one 12 oz bottle of beer (355 mL), one 5 oz glass of wine (148 mL), or one 1½ oz glass of hard liquor (44 mL).  Reading food labels  Check food labels for:  Trans fats.  Partially hydrogenated oils.  Saturated fat (g) in each serving.  Cholesterol (mg) in each serving.  Fiber (g) in each serving.  Choose foods with healthy fats, such as:  Monounsaturated fats and polyunsaturated fats. These include olive and canola oil, flaxseeds, walnuts, almonds, and seeds.  Omega-3 fats. These are found in certain fish, flaxseed oil, and ground flaxseeds.  Choose grain products that have whole grains. Look for the word \"whole\" as the first word in the ingredient list.  Cooking  Cook foods using low-fat methods. These include baking, boiling, grilling, and broiling.  Eat more home-cooked foods. Eat at restaurants and buffets less often. Eat less fast food.  Avoid cooking using saturated fats, such as butter, cream, palm oil, palm kernel oil, and coconut oil.  Meal planning    At meals, divide your plate into four equal parts:  Fill one-half of your plate with vegetables, green salads, and fruit.  Fill one-fourth of your plate with whole grains.  Fill one-fourth of your plate with low-fat (lean) protein foods.  Eat fish that is high in omega-3 fats at least two times a week. This includes mackerel, tuna, sardines, and salmon.  Eat foods that are high in fiber, such as whole grains, beans, apples, pears, berries, broccoli, carrots, peas, and barley.  What foods should I eat?  Fruits  All fresh, canned (in natural juice), or frozen fruits.  Vegetables  Fresh or frozen vegetables (raw, steamed, roasted, or grilled). Green salads.  Grains  Whole grains, such as whole wheat or whole grain breads, crackers, cereals, and pasta. Unsweetened oatmeal, bulgur, barley, quinoa, or brown rice. Corn or whole wheat flour tortillas.  Meats and other protein foods  Ground beef " (85% or leaner), grass-fed beef, or beef trimmed of fat. Skinless chicken or turkey. Ground chicken or turkey. Pork trimmed of fat. All fish and seafood. Egg whites. Dried beans, peas, or lentils. Unsalted nuts or seeds. Unsalted canned beans. Nut butters without added sugar or oil.  Dairy  Low-fat or nonfat dairy products, such as skim or 1% milk, 2% or reduced-fat cheeses, low-fat and fat-free ricotta or cottage cheese, or plain low-fat and nonfat yogurt.  Fats and oils  Tub margarine without trans fats. Light or reduced-fat mayonnaise and salad dressings. Avocado. Olive, canola, sesame, or safflower oils.  The items listed above may not be a complete list of foods and beverages you can eat. Contact a dietitian for more information.  What foods should I avoid?  Fruits  Canned fruit in heavy syrup. Fruit in cream or butter sauce. Fried fruit.  Vegetables  Vegetables cooked in cheese, cream, or butter sauce. Fried vegetables.  Grains  White bread. White pasta. White rice. Cornbread. Bagels, pastries, and croissants. Crackers and snack foods that contain trans fat and hydrogenated oils.  Meats and other protein foods  Fatty cuts of meat. Ribs, chicken wings, wiseman, sausage, bologna, salami, chitterlings, fatback, hot dogs, bratwurst, and packaged lunch meats. Liver and organ meats. Whole eggs and egg yolks. Chicken and turkey with skin. Fried meat.  Dairy  Whole or 2% milk, cream, half-and-half, and cream cheese. Whole milk cheeses. Whole-fat or sweetened yogurt. Full-fat cheeses. Nondairy creamers and whipped toppings. Processed cheese, cheese spreads, and cheese curds.  Fats and oils  Butter, stick margarine, lard, shortening, ghee, or wiseman fat. Coconut, palm kernel, and palm oils.  Beverages  Alcohol. Sugar-sweetened drinks such as sodas, lemonade, and fruit drinks.  Sweets and desserts  Corn syrup, sugars, honey, and molasses. Candy. Jam and jelly. Syrup. Sweetened cereals. Cookies, pies, cakes, donuts, muffins,  and ice cream.  The items listed above may not be a complete list of foods and beverages you should avoid. Contact a dietitian for more information.  Summary  Choosing the right foods helps keep your fat and cholesterol at normal levels. This can keep you from getting certain diseases.  At meals, fill one-half of your plate with vegetables, green salads, and fruits.  Eat high fiber foods, like whole grains, beans, apples, pears, berries, carrots, peas, and barley.  Limit added sugar, saturated fats, alcohol, and fried foods.  This information is not intended to replace advice given to you by your health care provider. Make sure you discuss any questions you have with your health care provider.  Document Revised: 04/29/2022 Document Reviewed: 04/29/2022  [employer] Patient Education © 2024 [employer]

## 2025-01-14 ENCOUNTER — OFFICE VISIT (OUTPATIENT)
Dept: FAMILY MEDICINE CLINIC | Facility: CLINIC | Age: 55
End: 2025-01-14
Payer: COMMERCIAL

## 2025-01-14 VITALS
HEIGHT: 73 IN | RESPIRATION RATE: 14 BRPM | WEIGHT: 267 LBS | BODY MASS INDEX: 35.39 KG/M2 | SYSTOLIC BLOOD PRESSURE: 140 MMHG | HEART RATE: 70 BPM | OXYGEN SATURATION: 96 % | TEMPERATURE: 97.9 F | DIASTOLIC BLOOD PRESSURE: 88 MMHG

## 2025-01-14 DIAGNOSIS — M19.012 PRIMARY OSTEOARTHRITIS OF BOTH SHOULDERS: ICD-10-CM

## 2025-01-14 DIAGNOSIS — L98.9 SKIN LESION OF FACE: ICD-10-CM

## 2025-01-14 DIAGNOSIS — M19.011 PRIMARY OSTEOARTHRITIS OF BOTH SHOULDERS: ICD-10-CM

## 2025-01-14 DIAGNOSIS — E53.8 B12 DEFICIENCY: ICD-10-CM

## 2025-01-14 DIAGNOSIS — I10 ESSENTIAL HYPERTENSION: Primary | Chronic | ICD-10-CM

## 2025-01-14 DIAGNOSIS — L20.9 ATOPIC DERMATITIS OF SCALP: Chronic | ICD-10-CM

## 2025-01-14 PROCEDURE — 96372 THER/PROPH/DIAG INJ SC/IM: CPT | Performed by: PHYSICIAN ASSISTANT

## 2025-01-14 PROCEDURE — 99214 OFFICE O/P EST MOD 30 MIN: CPT | Performed by: PHYSICIAN ASSISTANT

## 2025-01-14 PROCEDURE — 17110 DESTRUCTION B9 LES UP TO 14: CPT | Performed by: PHYSICIAN ASSISTANT

## 2025-01-14 RX ORDER — CYANOCOBALAMIN 1000 UG/ML
1000 INJECTION, SOLUTION INTRAMUSCULAR; SUBCUTANEOUS ONCE
Status: COMPLETED | OUTPATIENT
Start: 2025-01-14 | End: 2025-01-14

## 2025-01-14 RX ORDER — LOSARTAN POTASSIUM 100 MG/1
100 TABLET ORAL DAILY
Qty: 30 TABLET | Refills: 5 | Status: SHIPPED | OUTPATIENT
Start: 2025-01-14

## 2025-01-14 RX ORDER — KETOCONAZOLE 20 MG/ML
SHAMPOO, SUSPENSION TOPICAL 2 TIMES WEEKLY
Qty: 120 ML | Refills: 5 | Status: SHIPPED | OUTPATIENT
Start: 2025-01-16

## 2025-01-14 RX ORDER — METHYLPREDNISOLONE ACETATE 80 MG/ML
80 INJECTION, SUSPENSION INTRA-ARTICULAR; INTRALESIONAL; INTRAMUSCULAR; SOFT TISSUE ONCE
Status: COMPLETED | OUTPATIENT
Start: 2025-01-14 | End: 2025-01-14

## 2025-01-14 RX ADMIN — CYANOCOBALAMIN 1000 MCG: 1000 INJECTION, SOLUTION INTRAMUSCULAR; SUBCUTANEOUS at 08:52

## 2025-01-14 RX ADMIN — METHYLPREDNISOLONE ACETATE 80 MG: 80 INJECTION, SUSPENSION INTRA-ARTICULAR; INTRALESIONAL; INTRAMUSCULAR; SOFT TISSUE at 08:51

## 2025-01-14 NOTE — PROGRESS NOTES
"Chief Complaint -hypertension    History of Present Illness -     Kwadwo Franks is a 54 y.o. male.     Hypertension-  Not at goal.  Patient reports that home blood pressures usually 140s over 80s with the use of losartan 50 mg daily.    Skin lesion-  Patient has skin lesion on the left cheek that has grown rapidly over the past month.    Itchy scalp-  He complains of dry itchy scalp that is worse since turning the heat on in the home which is drying out the air.  Minimal relief with over-the-counter shampoo.    B12 deficiency-  Stable with B12 supplementation    Osteoarthritis of shoulders-  Not at goal as patient states it is worse with colder weather and he is physically demanding job in waste management.    The following portions of the patient's history were reviewed and updated as appropriate: allergies, current medications, past family history, past medical history, past social history, past surgical history and problem list.        Objective  Vital signs:  /88 (BP Location: Right arm, Patient Position: Sitting, Cuff Size: Adult)   Pulse 70   Temp 97.9 °F (36.6 °C) (Temporal)   Resp 14   Ht 185.4 cm (72.99\")   Wt 121 kg (267 lb)   SpO2 96%   BMI 35.23 kg/m²     Physical Exam  Vitals and nursing note reviewed.   Constitutional:       Appearance: Normal appearance. He is well-developed.   Eyes:      Extraocular Movements: Extraocular movements intact.      Conjunctiva/sclera: Conjunctivae normal.   Cardiovascular:      Rate and Rhythm: Normal rate and regular rhythm.      Heart sounds: Normal heart sounds. No murmur heard.  Pulmonary:      Effort: Pulmonary effort is normal. No respiratory distress.      Breath sounds: Normal breath sounds. No wheezing.   Musculoskeletal:         General: No tenderness.   Skin:     General: Skin is warm and dry.      Findings: No rash.      Comments: Dry skin noted at scalp diffusely.  Approximately 1/2 x 1/2 cm papule left cheek erythematous   Neurological:      " Mental Status: He is alert and oriented to person, place, and time.   Psychiatric:         Mood and Affect: Mood normal.         Behavior: Behavior normal.         Thought Content: Thought content normal.         The following data was reviewed by Arianna Kim PA-C:         Lab Results   Component Value Date    BUN 9 02/22/2024    CREATININE 0.78 02/22/2024    EGFR 106.0 02/22/2024    ALT 20 02/22/2024    AST 20 02/22/2024    CHOL 165 02/22/2024    TRIG 116 02/22/2024    HDL 36 (L) 02/22/2024     (H) 02/22/2024    TSH 2.480 12/27/2021    HGBA1C 6.2 (A) 12/02/2024           Assessment & Plan     Diagnoses and all orders for this visit:    1. Essential hypertension (Primary)  Comments:  Increase losartan 100 mg daily  Advise daily BP and pulse monitoring and low-sodium diet  Orders:  -     losartan (COZAAR) 100 MG tablet; Take 1 tablet by mouth Daily.  Dispense: 30 tablet; Refill: 5    2. B12 deficiency  -     cyanocobalamin injection 1,000 mcg    3. Primary osteoarthritis of both shoulders  -     methylPREDNISolone acetate (DEPO-medrol) injection 80 mg    4. Atopic dermatitis of scalp  Comments:  Advised patient to use humidifier at home  Orders:  -     ketoconazole (NIZORAL) 2 % shampoo; Apply  topically to the appropriate area as directed 2 (Two) Times a Week.  Dispense: 120 mL; Refill: 5    5. Skin lesion of face  Comments:  Differential diagnosis includes wart versus malignancy  Plan cryotherapy today and reevaluate in 6 weeks    Procedure: Cryotherapy skin lesion left cheek of face  Provider: Arianna Raymond PA-C  Indication: Skin lesion consistent with wart versus malignancy  Timeout was taken to verify correct patient procedure and location  Description: The left face was prepped and draped in sterile fashion.  The eyes were covered to prevent damage.  Liquid nitrogen was used for cryotherapy to be applied to the skin lesion x 3 applications.  Sterile dressing was placed.  No complications  Estimated blood  loss: Minimal  Patient tolerated the procedure well.               Patient was given instructions and counseling regarding his condition or for health maintenance advice. Please see specific information pulled into the AVS if appropriate      This document has been electronically signed by:  Arianna Kim PA-C

## 2025-01-14 NOTE — PROGRESS NOTES
Injection  Injection performed in left deltoid by Annika Franks MA. Patient tolerated the procedure well without complications.  01/14/25   Annika Franks MA       Injection  Injection performed in right deltoid by Annika Franks MA. Patient tolerated the procedure well without complications.  01/14/25   Annika Franks MA

## 2025-02-18 ENCOUNTER — PROCEDURE VISIT (OUTPATIENT)
Dept: FAMILY MEDICINE CLINIC | Facility: CLINIC | Age: 55
End: 2025-02-18
Payer: COMMERCIAL

## 2025-02-18 VITALS
SYSTOLIC BLOOD PRESSURE: 150 MMHG | HEIGHT: 73 IN | OXYGEN SATURATION: 98 % | BODY MASS INDEX: 36.18 KG/M2 | DIASTOLIC BLOOD PRESSURE: 90 MMHG | TEMPERATURE: 98.7 F | HEART RATE: 79 BPM | WEIGHT: 273 LBS

## 2025-02-18 DIAGNOSIS — M19.011 PRIMARY OSTEOARTHRITIS OF BOTH SHOULDERS: Primary | Chronic | ICD-10-CM

## 2025-02-18 DIAGNOSIS — I10 ESSENTIAL HYPERTENSION: Chronic | ICD-10-CM

## 2025-02-18 DIAGNOSIS — L98.9 SKIN LESION OF FACE: ICD-10-CM

## 2025-02-18 DIAGNOSIS — M19.012 PRIMARY OSTEOARTHRITIS OF BOTH SHOULDERS: Primary | Chronic | ICD-10-CM

## 2025-02-18 DIAGNOSIS — E11.9 TYPE 2 DIABETES MELLITUS WITHOUT COMPLICATION, WITHOUT LONG-TERM CURRENT USE OF INSULIN: Chronic | ICD-10-CM

## 2025-02-18 DIAGNOSIS — E78.2 MIXED HYPERLIPIDEMIA: Chronic | ICD-10-CM

## 2025-02-18 PROCEDURE — 96372 THER/PROPH/DIAG INJ SC/IM: CPT | Performed by: PHYSICIAN ASSISTANT

## 2025-02-18 PROCEDURE — 17110 DESTRUCTION B9 LES UP TO 14: CPT | Performed by: PHYSICIAN ASSISTANT

## 2025-02-18 PROCEDURE — 99214 OFFICE O/P EST MOD 30 MIN: CPT | Performed by: PHYSICIAN ASSISTANT

## 2025-02-18 PROCEDURE — 20610 DRAIN/INJ JOINT/BURSA W/O US: CPT | Performed by: PHYSICIAN ASSISTANT

## 2025-02-18 RX ADMIN — CYANOCOBALAMIN 1000 MCG: 1000 INJECTION, SOLUTION INTRAMUSCULAR; SUBCUTANEOUS at 08:53

## 2025-02-18 NOTE — PROGRESS NOTES
"Chief Complaint -shoulder pain    History of Present Illness -     Kwadwo Franks is a 54 y.o. male.     Shoulder pain-  Patient complains of bilateral moderate to severe achy shoulder pain due to osteoarthritis.  Worse with cold weather and increased activity.     Hypertension-  Likely temporarily elevated due to stress.  Patient works as a  and local areas have floated so he has been very active and had increased stress.  Patient reports home blood pressure is usually less than 130/80 without medication at home    Hyperlipidemia-  Stable with rosuvastatin and low-cholesterol diet    Skin lesion-  Patient has skin lesion on the left face.  Cryotherapy was performed several months ago with a significant reduction in size of the lesion.  Patient states the lesion does bother him because he can see it in his field of vision.      The following portions of the patient's history were reviewed and updated as appropriate: allergies, current medications, past family history, past medical history, past social history, past surgical history and problem list.        Objective  Vital signs:  /90   Pulse 79   Temp 98.7 °F (37.1 °C) (Temporal)   Ht 185.4 cm (72.99\")   Wt 124 kg (273 lb)   SpO2 98%   BMI 36.03 kg/m²     Physical Exam  Vitals and nursing note reviewed.   Constitutional:       Appearance: Normal appearance. He is well-developed.   Eyes:      Extraocular Movements: Extraocular movements intact.      Conjunctiva/sclera: Conjunctivae normal.   Cardiovascular:      Rate and Rhythm: Normal rate and regular rhythm.      Heart sounds: Normal heart sounds. No murmur heard.  Pulmonary:      Effort: Pulmonary effort is normal. No respiratory distress.      Breath sounds: Normal breath sounds. No wheezing.   Musculoskeletal:         General: Tenderness present.      Comments: Coarse crepitus noted bilateral shoulder joints with manipulation.  Decreased range of joint motion with abduction.   Skin:     " General: Skin is warm and dry.      Findings: No rash.      Comments: Approximately 1/4 x 1/4 cm pink papule left cheek   Neurological:      Mental Status: He is alert and oriented to person, place, and time.   Psychiatric:         Mood and Affect: Mood normal.         Behavior: Behavior normal.         Thought Content: Thought content normal.         The following data was reviewed by Arianna Kim PA-C:         Lab Results   Component Value Date    BUN 9 02/22/2024    CREATININE 0.78 02/22/2024    EGFR 106.0 02/22/2024    ALT 20 02/22/2024    AST 20 02/22/2024    CHOL 165 02/22/2024    TRIG 116 02/22/2024    HDL 36 (L) 02/22/2024     (H) 02/22/2024    TSH 2.480 12/27/2021    HGBA1C 6.2 (A) 12/02/2024           Assessment & Plan     Diagnoses and all orders for this visit:    1. Primary osteoarthritis of both shoulders (Primary)  Comments:  Bilateral intra-articular shoulder injections performed today.  Advised activity as tolerated    2. Type 2 diabetes mellitus without complication, without long-term current use of insulin  Comments:  Increase semaglutide 2 mg weekly and discussed giving 1 mg biweekly as an option  Advised a low carbohydrate diabetic diet  Orders:  -     Semaglutide, 2 MG/DOSE, (OZEMPIC) 8 MG/3ML solution pen-injector; Inject 2 mg under the skin into the appropriate area as directed 1 (One) Time Per Week.  Dispense: 3 mL; Refill: 5  -     Comprehensive Metabolic Panel; Future  -     Hemoglobin A1c; Future  -     Lipid Panel; Future  -     CBC & Differential; Future  -     Microalbumin / Creatinine Urine Ratio - Urine, Clean Catch; Future  -     Testosterone (Free & Total), LC / MS; Future    3. Essential hypertension  Comments:  Continue to monitor and report any consistent readings greater 130/80  Orders:  -     Comprehensive Metabolic Panel; Future  -     Testosterone (Free & Total), LC / MS; Future    4. Mixed hyperlipidemia  Comments:  Continue rosuvastatin  Advised low-cholesterol  diet  Orders:  -     Lipid Panel; Future    5. Skin lesion of face  Comments:  Cryotherapy performed today left cheek skin lesion likely wart using cryotherapy.  Patient tolerated the procedure well.      Procedure: Bilateral intra-articular shoulder injections  Provider: Arianna Kim PA-C  Indication: Bilateral shoulder pain secondary to osteoarthritis  Timeout was taken to verify correct patient procedure and location  Description: Bilateral shoulder joints were prepped and draped in sterile fashion.  An intra-articular injection was given into each shoulder joint using 3 cc 1% lidocaine, 1 cc of triamcinolone and 1 cc of Depo-Medrol.  Pressure was held and sterile dressings were placed.  No complications  Estimated blood loss: Minimal  Patient tolerated procedure well    1% lidocaine 10 mg/mL  NDC number 0497-9105-07  Lot #[id number]  Expiration 1/2026    Triamcinolone 40 mg/mL  NDC number 4674-1410-43  Lot #[id number]  Expiration January 2026    Depo-Medrol 40 mg/mL  NDC number 0698-9410-31  Lot number [id number]  Expiration 6/2025               Patient was given instructions and counseling regarding his condition or for health maintenance advice. Please see specific information pulled into the AVS if appropriate      This document has been electronically signed by:  Arianna Kim PA-C

## 2025-03-17 ENCOUNTER — PATIENT OUTREACH (OUTPATIENT)
Dept: FAMILY MEDICINE CLINIC | Facility: CLINIC | Age: 55
End: 2025-03-17
Payer: COMMERCIAL

## 2025-05-15 ENCOUNTER — LAB (OUTPATIENT)
Dept: FAMILY MEDICINE CLINIC | Facility: CLINIC | Age: 55
End: 2025-05-15
Payer: COMMERCIAL

## 2025-05-15 DIAGNOSIS — I10 ESSENTIAL HYPERTENSION: Chronic | ICD-10-CM

## 2025-05-15 DIAGNOSIS — E11.9 TYPE 2 DIABETES MELLITUS WITHOUT COMPLICATION, WITHOUT LONG-TERM CURRENT USE OF INSULIN: ICD-10-CM

## 2025-05-15 DIAGNOSIS — E78.2 MIXED HYPERLIPIDEMIA: Chronic | ICD-10-CM

## 2025-05-15 LAB
ALBUMIN SERPL-MCNC: 4.2 G/DL (ref 3.5–5.2)
ALBUMIN UR-MCNC: <1.2 MG/DL
ALBUMIN/GLOB SERPL: 1.2 G/DL
ALP SERPL-CCNC: 57 U/L (ref 39–117)
ALT SERPL W P-5'-P-CCNC: 15 U/L (ref 1–41)
ANION GAP SERPL CALCULATED.3IONS-SCNC: 9.7 MMOL/L (ref 5–15)
AST SERPL-CCNC: 16 U/L (ref 1–40)
BASOPHILS # BLD AUTO: 0.09 10*3/MM3 (ref 0–0.2)
BASOPHILS NFR BLD AUTO: 0.8 % (ref 0–1.5)
BILIRUB SERPL-MCNC: 0.5 MG/DL (ref 0–1.2)
BUN SERPL-MCNC: 8 MG/DL (ref 6–20)
BUN/CREAT SERPL: 9.5 (ref 7–25)
CALCIUM SPEC-SCNC: 9.2 MG/DL (ref 8.6–10.5)
CHLORIDE SERPL-SCNC: 105 MMOL/L (ref 98–107)
CHOLEST SERPL-MCNC: 121 MG/DL (ref 0–200)
CO2 SERPL-SCNC: 24.3 MMOL/L (ref 22–29)
CREAT SERPL-MCNC: 0.84 MG/DL (ref 0.76–1.27)
CREAT UR-MCNC: 49.3 MG/DL
DEPRECATED RDW RBC AUTO: 39.6 FL (ref 37–54)
EGFRCR SERPLBLD CKD-EPI 2021: 103 ML/MIN/1.73
EOSINOPHIL # BLD AUTO: 0.39 10*3/MM3 (ref 0–0.4)
EOSINOPHIL NFR BLD AUTO: 3.4 % (ref 0.3–6.2)
ERYTHROCYTE [DISTWIDTH] IN BLOOD BY AUTOMATED COUNT: 12.5 % (ref 12.3–15.4)
GLOBULIN UR ELPH-MCNC: 3.5 GM/DL
GLUCOSE SERPL-MCNC: 125 MG/DL (ref 65–99)
HBA1C MFR BLD: 6.1 % (ref 4.8–5.6)
HCT VFR BLD AUTO: 45.6 % (ref 37.5–51)
HDLC SERPL-MCNC: 37 MG/DL (ref 40–60)
HGB BLD-MCNC: 15.6 G/DL (ref 13–17.7)
IMM GRANULOCYTES # BLD AUTO: 0.04 10*3/MM3 (ref 0–0.05)
IMM GRANULOCYTES NFR BLD AUTO: 0.3 % (ref 0–0.5)
LDLC SERPL CALC-MCNC: 69 MG/DL (ref 0–100)
LDLC/HDLC SERPL: 1.86 {RATIO}
LYMPHOCYTES # BLD AUTO: 1.79 10*3/MM3 (ref 0.7–3.1)
LYMPHOCYTES NFR BLD AUTO: 15.5 % (ref 19.6–45.3)
MCH RBC QN AUTO: 29.5 PG (ref 26.6–33)
MCHC RBC AUTO-ENTMCNC: 34.2 G/DL (ref 31.5–35.7)
MCV RBC AUTO: 86.4 FL (ref 79–97)
MICROALBUMIN/CREAT UR: NORMAL MG/G{CREAT}
MONOCYTES # BLD AUTO: 0.81 10*3/MM3 (ref 0.1–0.9)
MONOCYTES NFR BLD AUTO: 7 % (ref 5–12)
NEUTROPHILS NFR BLD AUTO: 73 % (ref 42.7–76)
NEUTROPHILS NFR BLD AUTO: 8.43 10*3/MM3 (ref 1.7–7)
NRBC BLD AUTO-RTO: 0 /100 WBC (ref 0–0.2)
PLATELET # BLD AUTO: 258 10*3/MM3 (ref 140–450)
PMV BLD AUTO: 10.8 FL (ref 6–12)
POTASSIUM SERPL-SCNC: 3.7 MMOL/L (ref 3.5–5.2)
PROT SERPL-MCNC: 7.7 G/DL (ref 6–8.5)
RBC # BLD AUTO: 5.28 10*6/MM3 (ref 4.14–5.8)
SODIUM SERPL-SCNC: 139 MMOL/L (ref 136–145)
TRIGL SERPL-MCNC: 75 MG/DL (ref 0–150)
VLDLC SERPL-MCNC: 15 MG/DL (ref 5–40)
WBC NRBC COR # BLD AUTO: 11.55 10*3/MM3 (ref 3.4–10.8)

## 2025-05-15 PROCEDURE — 84403 ASSAY OF TOTAL TESTOSTERONE: CPT | Performed by: PHYSICIAN ASSISTANT

## 2025-05-15 PROCEDURE — 82570 ASSAY OF URINE CREATININE: CPT | Performed by: PHYSICIAN ASSISTANT

## 2025-05-15 PROCEDURE — 84402 ASSAY OF FREE TESTOSTERONE: CPT | Performed by: PHYSICIAN ASSISTANT

## 2025-05-15 PROCEDURE — 85025 COMPLETE CBC W/AUTO DIFF WBC: CPT | Performed by: PHYSICIAN ASSISTANT

## 2025-05-15 PROCEDURE — 80061 LIPID PANEL: CPT | Performed by: PHYSICIAN ASSISTANT

## 2025-05-15 PROCEDURE — 83036 HEMOGLOBIN GLYCOSYLATED A1C: CPT | Performed by: PHYSICIAN ASSISTANT

## 2025-05-15 PROCEDURE — 82043 UR ALBUMIN QUANTITATIVE: CPT | Performed by: PHYSICIAN ASSISTANT

## 2025-05-15 PROCEDURE — 80053 COMPREHEN METABOLIC PANEL: CPT | Performed by: PHYSICIAN ASSISTANT

## 2025-05-15 PROCEDURE — 36415 COLL VENOUS BLD VENIPUNCTURE: CPT

## 2025-05-19 ENCOUNTER — OFFICE VISIT (OUTPATIENT)
Dept: FAMILY MEDICINE CLINIC | Facility: CLINIC | Age: 55
End: 2025-05-19
Payer: COMMERCIAL

## 2025-05-19 VITALS
TEMPERATURE: 98 F | OXYGEN SATURATION: 98 % | DIASTOLIC BLOOD PRESSURE: 92 MMHG | WEIGHT: 257.6 LBS | HEART RATE: 73 BPM | HEIGHT: 73 IN | SYSTOLIC BLOOD PRESSURE: 142 MMHG | BODY MASS INDEX: 34.14 KG/M2

## 2025-05-19 DIAGNOSIS — M19.012 PRIMARY OSTEOARTHRITIS OF BOTH SHOULDERS: Primary | Chronic | ICD-10-CM

## 2025-05-19 DIAGNOSIS — E53.8 B12 DEFICIENCY: ICD-10-CM

## 2025-05-19 DIAGNOSIS — M19.011 PRIMARY OSTEOARTHRITIS OF BOTH SHOULDERS: Primary | Chronic | ICD-10-CM

## 2025-05-19 DIAGNOSIS — F41.1 GAD (GENERALIZED ANXIETY DISORDER): Chronic | ICD-10-CM

## 2025-05-19 DIAGNOSIS — E11.9 TYPE 2 DIABETES MELLITUS WITHOUT COMPLICATION, WITHOUT LONG-TERM CURRENT USE OF INSULIN: Chronic | ICD-10-CM

## 2025-05-19 DIAGNOSIS — I10 ESSENTIAL HYPERTENSION: Chronic | ICD-10-CM

## 2025-05-19 DIAGNOSIS — L20.9 ATOPIC DERMATITIS OF SCALP: Chronic | ICD-10-CM

## 2025-05-19 DIAGNOSIS — E78.2 MIXED HYPERLIPIDEMIA: Chronic | ICD-10-CM

## 2025-05-19 DIAGNOSIS — J30.1 ALLERGIC RHINITIS DUE TO POLLEN, UNSPECIFIED SEASONALITY: Chronic | ICD-10-CM

## 2025-05-19 RX ORDER — KETOCONAZOLE 20 MG/ML
SHAMPOO, SUSPENSION TOPICAL 2 TIMES WEEKLY
Qty: 120 ML | Refills: 5 | Status: SHIPPED | OUTPATIENT
Start: 2025-05-19

## 2025-05-19 RX ORDER — MONTELUKAST SODIUM 10 MG/1
10 TABLET ORAL NIGHTLY
Qty: 30 TABLET | Refills: 5 | Status: SHIPPED | OUTPATIENT
Start: 2025-05-19

## 2025-05-19 RX ORDER — CYANOCOBALAMIN 1000 UG/ML
1000 INJECTION, SOLUTION INTRAMUSCULAR; SUBCUTANEOUS ONCE
Status: COMPLETED | OUTPATIENT
Start: 2025-05-19 | End: 2025-05-19

## 2025-05-19 RX ORDER — FLUOXETINE HYDROCHLORIDE 40 MG/1
40 CAPSULE ORAL DAILY
Qty: 30 CAPSULE | Refills: 5 | Status: SHIPPED | OUTPATIENT
Start: 2025-05-19

## 2025-05-19 RX ORDER — ROSUVASTATIN CALCIUM 20 MG/1
20 TABLET, COATED ORAL DAILY
Qty: 30 TABLET | Refills: 5 | Status: SHIPPED | OUTPATIENT
Start: 2025-05-19

## 2025-05-19 RX ORDER — LOSARTAN POTASSIUM 100 MG/1
100 TABLET ORAL DAILY
Qty: 30 TABLET | Refills: 5 | Status: SHIPPED | OUTPATIENT
Start: 2025-05-19

## 2025-05-19 RX ADMIN — CYANOCOBALAMIN 1000 MCG: 1000 INJECTION, SOLUTION INTRAMUSCULAR; SUBCUTANEOUS at 09:44

## 2025-05-20 LAB
TESTOST FREE SERPL-MCNC: 7.1 PG/ML (ref 7.2–24)
TESTOST SERPL-MCNC: 483.5 NG/DL (ref 264–916)

## 2025-05-20 NOTE — PROGRESS NOTES
"Chief Complaint -shoulder pain    History of Present Illness -     Kwadwo Franks is a 54 y.o. male.     Shoulder pain-  Patient complains of bilateral moderate to severe achy shoulder pain due to osteoarthritis.  Some relief with steroid injections in the past.    B12 deficiency-  Stable with B12 supplementation    Hyperlipidemia-  Stable with rosuvastatin and low-cholesterol diet    Allergic rhinitis-  Stable Singulair    Hypertension-  Likely elevated today due to pain.  Patient reports blood pressure at home is controlled with use of losartan    Generalized anxiety disorder-  Stable with fluoxetine.  He denies any hallucinations, SI or HI    Atopic dermatitis of the scalp-  Stable with the use of ketoconazole shampoo      The following portions of the patient's history were reviewed and updated as appropriate: allergies, current medications, past family history, past medical history, past social history, past surgical history and problem list.        Objective  Vital signs:  /92 (BP Location: Right arm, Patient Position: Sitting, Cuff Size: Adult)   Pulse 73   Temp 98 °F (36.7 °C) (Temporal)   Ht 185.4 cm (72.99\")   Wt 117 kg (257 lb 9.6 oz)   SpO2 98%   BMI 34.00 kg/m²     Physical Exam  Vitals and nursing note reviewed.   Constitutional:       Appearance: Normal appearance. He is well-developed.   Eyes:      Extraocular Movements: Extraocular movements intact.      Conjunctiva/sclera: Conjunctivae normal.   Cardiovascular:      Rate and Rhythm: Normal rate and regular rhythm.      Heart sounds: Normal heart sounds. No murmur heard.  Pulmonary:      Effort: Pulmonary effort is normal. No respiratory distress.      Breath sounds: Normal breath sounds. No wheezing.   Musculoskeletal:         General: Tenderness (Bilateral shoulders) present.   Skin:     General: Skin is warm and dry.      Findings: No rash.   Neurological:      Mental Status: He is alert and oriented to person, place, and time. "   Psychiatric:         Mood and Affect: Mood normal.         Behavior: Behavior normal.         Thought Content: Thought content normal.         The following data was reviewed by Arianna Kim PA-C:         Lab Results   Component Value Date    BUN 8 05/15/2025    CREATININE 0.84 05/15/2025    EGFR 103.0 05/15/2025    ALT 15 05/15/2025    AST 16 05/15/2025    WBC 11.55 (H) 05/15/2025    HGB 15.6 05/15/2025    HCT 45.6 05/15/2025     05/15/2025    CHOL 121 05/15/2025    TRIG 75 05/15/2025    HDL 37 (L) 05/15/2025    LDL 69 05/15/2025    TSH 2.480 12/27/2021    HGBA1C 6.10 (H) 05/15/2025           Assessment & Plan     Diagnoses and all orders for this visit:    1. Primary osteoarthritis of both shoulders (Primary)  Comments:  Bilateral intra-articular shoulder injections performed today.  Advised activity as tolerated    2. B12 deficiency  -     cyanocobalamin injection 1,000 mcg    3. Mixed hyperlipidemia  Comments:  Continue rosuvastatin  Advised low-cholesterol diet  Orders:  -     rosuvastatin (CRESTOR) 20 MG tablet; Take 1 tablet by mouth Daily.  Dispense: 30 tablet; Refill: 5    4. Allergic rhinitis due to pollen, unspecified seasonality  Comments:  Continue Singulair  Advised to avoid known environmental allergens when possible  Orders:  -     montelukast (Singulair) 10 MG tablet; Take 1 tablet by mouth Every Night.  Dispense: 30 tablet; Refill: 5    5. Essential hypertension  Comments:  Continue losartan 100 mg daily  Advise daily BP and pulse monitoring and low-sodium diet  Orders:  -     losartan (COZAAR) 100 MG tablet; Take 1 tablet by mouth Daily.  Dispense: 30 tablet; Refill: 5    6. SCOTT (generalized anxiety disorder)  Comments:  discontinue zoloft due to increased appetite  Continue Prozac  Orders:  -     FLUoxetine (PROzac) 40 MG capsule; Take 1 capsule by mouth Daily.  Dispense: 30 capsule; Refill: 5    7. Type 2 diabetes mellitus without complication, without long-term current use of  insulin  Comments:  Increase semaglutide 2 mg weekly and discussed giving 1 mg biweekly as an option  Advised a low carbohydrate diabetic diet  Orders:  -     Semaglutide, 2 MG/DOSE, (OZEMPIC) 8 MG/3ML solution pen-injector; Inject 2 mg under the skin into the appropriate area as directed 1 (One) Time Per Week.  Dispense: 3 mL; Refill: 5    8. Atopic dermatitis of scalp  Comments:  Advised patient to use humidifier at home  Orders:  -     ketoconazole (NIZORAL) 2 % shampoo; Apply  topically to the appropriate area as directed 2 (Two) Times a Week.  Dispense: 120 mL; Refill: 5      Procedure: Bilateral intra-articular shoulder injections  Provider: Arianna Kim PA-C  Indication: Bilateral shoulder pain due to osteoarthritis  Timeout was taken to verify correct patient procedure and location  Description: The bilateral shoulder areas were prepped and draped in sterile fashion.  An intra-articular injection was given into each shoulder joint using 3 cc 1% lidocaine, 1 cc of triamcinolone and 1 cc of Depo-Medrol.  Pressure was held and sterile dressing was placed  No complications  Estimated blood loss: Minimal  Patient tolerated procedure well    1% lidocaine 10 mg/mL  NDC number 58787-421-62  Lot #[id number]  Expiration August 2026    Triamcinolone 40 mg/mL  NDC number 4708-7824-83  Lot #[id number]  Expiration May 2026    Depo-Medrol 40 mg/mL  NDC number 1973-6090-26  Lot number [id number]  Expiration 6/2025             Patient was given instructions and counseling regarding his condition or for health maintenance advice. Please see specific information pulled into the AVS if appropriate      This document has been electronically signed by:  Arianna Kim PA-C

## 2025-05-20 NOTE — PATIENT INSTRUCTIONS
Joint Steroid Injection: What to Expect  A joint steroid injection is a procedure to relieve swelling and pain in a joint. Steroids are medicines that reduce irritation and swelling, also called inflammation. Your health care provider will use a syringe and a needle to inject a steroid medicine into a painful and inflamed joint. A pain-relieving medicine may be injected along with the steroid.  Joints that are often treated with steroid injections include the knee, shoulder, hip, and spine. These injections may also be used in the elbow, ankle, and joints of the hands or feet.  Joint steroid injections may be repeated, but having them too often can damage a joint or the skin over the joint. You should not have joint steroid injections less than 6 weeks apart or more than 4 times a year.  Tell a health care provider about:  Any allergies you have.  All medicines you take. These include vitamins, herbs, eye drops, and creams.  Any problems you or family members have had with anesthesia.  Any bleeding problems you have.  Any surgeries you've had.  Any medical conditions you have.  Whether you're pregnant or may be pregnant.  What are the risks?  Your provider will talk with you about risks. These may include:  Infection.  Bleeding.  Allergies to medicines.  Damage to the joint or tissues around the joint.  Thinning of skin or loss of skin color over the joint.  Temporary flushing of the face or chest.  Temporary increase in pain.  Temporary increase in blood sugar.  Failure to relieve inflammation or pain.  What happens before the treatment?  Medicines  Ask about changing or stopping:  Any medicines you take.  Any vitamins, herbs, or supplements you take.  Do not take aspirin or ibuprofen unless you're told to.  General instructions  Plan to have a responsible adult drive you home from the hospital or clinic. You won't be allowed to drive.  If you have diabetes or pre-diabetes:  Talk with your provider about how to  manage your blood sugar, also called blood glucose. Steroid medicine can make your blood sugar go up and stay high for a few days.  Your provider can make a plan to make sure your blood sugar level stays under control.  What happens during the treatment?    Your provider will position you for the injection and locate the injection site over your joint.  The skin over the joint will be cleaned with a soap that kills germs.  Your provider may:  Spray a numbing solution, called a topical anesthetic, over the injection site.  Inject a local anesthetic under the skin above your joint.  The needle will be placed through your skin into your joint.  Your provider may use imaging such as ultrasound or fluoroscopy to guide the needle to the right spot for the injection.  If imaging is used, a special dye called a contrast dye may be injected to check that the needle is in the correct location.  The steroid medicine will be injected into your joint.  Anesthetic may be injected along with the steroid. This may be a medicine that relieves pain for:  A short time. This is called a short-acting anesthetic.  A longer time. This is called a long-acting anesthetic.  The needle will be removed, and a bandage may be placed over the injection site.  These steps may vary. Ask what you can expect.  What can I expect after the treatment?  It's normal to feel slight flushing for a few days after the injection.  After the treatment, it's common to have an increase in joint pain after the anesthetic has worn off. This may happen about an hour after a short-acting anesthetic or about 8 hours after a longer-acting anesthetic.  You should begin to feel relief from joint pain and swelling within 3-7 days, sometimes sooner. Call your provider if you don't begin to feel relief after 7 days.  Follow these instructions at home:  Injection site care  Take care of your injection site as told.  Check your injection site every day for signs of infection.  Check for:  More redness, swelling, or pain.  Fluid or blood.  More warmth.  Pus or a bad smell.  Activity  Rest as told.  Ask what things are safe for you to do at home. Ask when you can go back to work or school.  Ask when it's safe to drive.  Do joint exercises as told.  Do not take baths, swim, or use a hot tub for 24 hours after your injection. Ask if you can take showers.  Managing pain, stiffness, and swelling    Use ice or an ice pack as told.  Place a towel between your skin and the ice.  Leave the ice on for 20 minutes, 2-3 times a day.  If your skin turns red, take off the ice right away to prevent skin damage. The risk of damage is higher if you can't feel pain, heat, or cold.  Raise your joint above the level of your heart while you're sitting or lying down. Use pillows as needed.  General instructions  Take your medicines only as told.  Do not smoke, vape, or use nicotine or tobacco. Doing this can slow down healing.  If you have diabetes or pre-diabetes:  Check your blood sugar often.  Take medicine to control your blood sugar levels as told by your provider.  Keep all follow-up visits. Schedule a follow-up visit in 2-3 weeks to check your body's response to the injection.  Contact a health care provider if you have:  Chills or a fever.  Any signs of infection at your injection site.  Increased pain or swelling.  This information is not intended to replace advice given to you by your health care provider. Make sure you discuss any questions you have with your health care provider.  Document Revised: 06/22/2024 Document Reviewed: 06/22/2024  [employer] Patient Education © 2024 [employer]

## 2025-08-18 ENCOUNTER — OFFICE VISIT (OUTPATIENT)
Dept: FAMILY MEDICINE CLINIC | Facility: CLINIC | Age: 55
End: 2025-08-18
Payer: COMMERCIAL

## 2025-08-18 VITALS
HEIGHT: 73 IN | WEIGHT: 256.4 LBS | TEMPERATURE: 98 F | HEART RATE: 86 BPM | SYSTOLIC BLOOD PRESSURE: 142 MMHG | OXYGEN SATURATION: 96 % | DIASTOLIC BLOOD PRESSURE: 88 MMHG | BODY MASS INDEX: 33.98 KG/M2

## 2025-08-18 DIAGNOSIS — E78.2 MIXED HYPERLIPIDEMIA: Chronic | ICD-10-CM

## 2025-08-18 DIAGNOSIS — I10 ESSENTIAL HYPERTENSION: Chronic | ICD-10-CM

## 2025-08-18 DIAGNOSIS — M19.011 PRIMARY OSTEOARTHRITIS OF BOTH SHOULDERS: Primary | ICD-10-CM

## 2025-08-18 DIAGNOSIS — M19.012 PRIMARY OSTEOARTHRITIS OF BOTH SHOULDERS: Primary | ICD-10-CM

## 2025-08-18 DIAGNOSIS — E53.8 B12 DEFICIENCY: Chronic | ICD-10-CM

## 2025-08-18 RX ORDER — DIPHENHYDRAMINE HCL 25 MG
25 CAPSULE ORAL EVERY 6 HOURS PRN
COMMUNITY
Start: 2025-06-08

## 2025-08-18 RX ADMIN — LIDOCAINE HYDROCHLORIDE 3 ML: 10 INJECTION, SOLUTION INFILTRATION; PERINEURAL at 13:24

## 2025-08-18 RX ADMIN — CYANOCOBALAMIN 1000 MCG: 1000 INJECTION, SOLUTION INTRAMUSCULAR; SUBCUTANEOUS at 09:27

## 2025-08-18 RX ADMIN — TRIAMCINOLONE ACETONIDE 80 MG: 40 INJECTION, SUSPENSION INTRA-ARTICULAR; INTRAMUSCULAR at 13:58

## 2025-08-18 RX ADMIN — METHYLPREDNISOLONE ACETATE 80 MG: 80 INJECTION, SUSPENSION INTRA-ARTICULAR; INTRALESIONAL; INTRAMUSCULAR; SOFT TISSUE at 13:56

## 2025-08-18 RX ADMIN — TRIAMCINOLONE ACETONIDE 40 MG: 40 INJECTION, SUSPENSION INTRA-ARTICULAR; INTRAMUSCULAR at 13:25

## 2025-08-18 RX ADMIN — LIDOCAINE HYDROCHLORIDE 3 ML: 10 INJECTION, SOLUTION INFILTRATION; PERINEURAL at 13:54

## 2025-08-18 RX ADMIN — METHYLPREDNISOLONE ACETATE 80 MG: 80 INJECTION, SUSPENSION INTRA-ARTICULAR; INTRALESIONAL; INTRAMUSCULAR; SOFT TISSUE at 13:26

## 2025-08-19 RX ORDER — TRIAMCINOLONE ACETONIDE 40 MG/ML
40 INJECTION, SUSPENSION INTRA-ARTICULAR; INTRAMUSCULAR ONCE
Status: COMPLETED | OUTPATIENT
Start: 2025-08-19 | End: 2025-08-18

## 2025-08-19 RX ORDER — METHYLPREDNISOLONE ACETATE 80 MG/ML
80 INJECTION, SUSPENSION INTRA-ARTICULAR; INTRALESIONAL; INTRAMUSCULAR; SOFT TISSUE ONCE
Status: COMPLETED | OUTPATIENT
Start: 2025-08-18 | End: 2025-08-18

## 2025-08-19 RX ORDER — TRIAMCINOLONE ACETONIDE 40 MG/ML
40 INJECTION, SUSPENSION INTRA-ARTICULAR; INTRAMUSCULAR ONCE
Status: COMPLETED | OUTPATIENT
Start: 2025-08-18 | End: 2025-08-18

## 2025-08-19 RX ORDER — LIDOCAINE HYDROCHLORIDE 10 MG/ML
3 INJECTION, SOLUTION INFILTRATION; PERINEURAL ONCE
Status: COMPLETED | OUTPATIENT
Start: 2025-08-19 | End: 2025-08-18

## 2025-08-19 RX ORDER — LIDOCAINE HYDROCHLORIDE 10 MG/ML
3 INJECTION, SOLUTION INFILTRATION; PERINEURAL ONCE
Status: COMPLETED | OUTPATIENT
Start: 2025-08-18 | End: 2025-08-18

## 2025-08-19 RX ORDER — METHYLPREDNISOLONE ACETATE 80 MG/ML
80 INJECTION, SUSPENSION INTRA-ARTICULAR; INTRALESIONAL; INTRAMUSCULAR; SOFT TISSUE ONCE
Status: COMPLETED | OUTPATIENT
Start: 2025-08-19 | End: 2025-08-18

## 2025-08-22 ENCOUNTER — DOCUMENTATION (OUTPATIENT)
Dept: FAMILY MEDICINE CLINIC | Facility: CLINIC | Age: 55
End: 2025-08-22
Payer: COMMERCIAL

## 2025-08-22 RX ORDER — METOPROLOL TARTRATE 25 MG/1
25 TABLET, FILM COATED ORAL 2 TIMES DAILY
Qty: 60 TABLET | Refills: 2 | Status: SHIPPED | OUTPATIENT
Start: 2025-08-22